# Patient Record
Sex: MALE | Race: WHITE | NOT HISPANIC OR LATINO | URBAN - METROPOLITAN AREA
[De-identification: names, ages, dates, MRNs, and addresses within clinical notes are randomized per-mention and may not be internally consistent; named-entity substitution may affect disease eponyms.]

---

## 2017-07-10 ENCOUNTER — INPATIENT (INPATIENT)
Facility: HOSPITAL | Age: 67
LOS: 10 days | Discharge: HOME | End: 2017-07-21
Attending: FAMILY MEDICINE | Admitting: FAMILY MEDICINE
Payer: MEDICARE

## 2017-07-10 VITALS
DIASTOLIC BLOOD PRESSURE: 98 MMHG | RESPIRATION RATE: 18 BRPM | OXYGEN SATURATION: 100 % | SYSTOLIC BLOOD PRESSURE: 159 MMHG | TEMPERATURE: 90 F | HEART RATE: 88 BPM

## 2017-07-10 DIAGNOSIS — C22.0 LIVER CELL CARCINOMA: ICD-10-CM

## 2017-07-10 DIAGNOSIS — J90 PLEURAL EFFUSION, NOT ELSEWHERE CLASSIFIED: ICD-10-CM

## 2017-07-10 DIAGNOSIS — E11.9 TYPE 2 DIABETES MELLITUS WITHOUT COMPLICATIONS: ICD-10-CM

## 2017-07-10 DIAGNOSIS — Z29.9 ENCOUNTER FOR PROPHYLACTIC MEASURES, UNSPECIFIED: ICD-10-CM

## 2017-07-10 DIAGNOSIS — A41.9 SEPSIS, UNSPECIFIED ORGANISM: ICD-10-CM

## 2017-07-10 DIAGNOSIS — I10 ESSENTIAL (PRIMARY) HYPERTENSION: ICD-10-CM

## 2017-07-10 DIAGNOSIS — I81 PORTAL VEIN THROMBOSIS: ICD-10-CM

## 2017-07-10 LAB
ABO RH CONFIRMATION: SIGNIFICANT CHANGE UP
ALBUMIN SERPL ELPH-MCNC: 1.8 G/DL — LOW (ref 3.3–5)
ALP SERPL-CCNC: 738 U/L — HIGH (ref 40–120)
ALT FLD-CCNC: 102 U/L — HIGH (ref 12–78)
ANION GAP SERPL CALC-SCNC: 9 MMOL/L — SIGNIFICANT CHANGE UP (ref 5–17)
APPEARANCE UR: CLEAR — SIGNIFICANT CHANGE UP
APTT BLD: 31.6 SEC — SIGNIFICANT CHANGE UP (ref 27.5–37.4)
AST SERPL-CCNC: 242 U/L — HIGH (ref 15–37)
BACTERIA # UR AUTO: (no result)
BASOPHILS # BLD AUTO: 0 K/UL — SIGNIFICANT CHANGE UP (ref 0–0.2)
BASOPHILS NFR BLD AUTO: 0.3 % — SIGNIFICANT CHANGE UP (ref 0–2)
BILIRUB SERPL-MCNC: 1.2 MG/DL — SIGNIFICANT CHANGE UP (ref 0.2–1.2)
BILIRUB UR-MCNC: NEGATIVE — SIGNIFICANT CHANGE UP
BLD GP AB SCN SERPL QL: SIGNIFICANT CHANGE UP
BUN SERPL-MCNC: 34 MG/DL — HIGH (ref 7–23)
CALCIUM SERPL-MCNC: 8.4 MG/DL — LOW (ref 8.5–10.1)
CHLORIDE SERPL-SCNC: 102 MMOL/L — SIGNIFICANT CHANGE UP (ref 96–108)
CK SERPL-CCNC: 58 U/L — SIGNIFICANT CHANGE UP (ref 26–308)
CO2 SERPL-SCNC: 26 MMOL/L — SIGNIFICANT CHANGE UP (ref 22–31)
COLOR SPEC: YELLOW — SIGNIFICANT CHANGE UP
CREAT SERPL-MCNC: 0.79 MG/DL — SIGNIFICANT CHANGE UP (ref 0.5–1.3)
DIFF PNL FLD: (no result)
EOSINOPHIL # BLD AUTO: 0 K/UL — SIGNIFICANT CHANGE UP (ref 0–0.5)
EOSINOPHIL NFR BLD AUTO: 0 % — SIGNIFICANT CHANGE UP (ref 0–6)
EPI CELLS # UR: SIGNIFICANT CHANGE UP
GLUCOSE SERPL-MCNC: 288 MG/DL — HIGH (ref 70–99)
GLUCOSE UR QL: 1000 MG/DL
HCT VFR BLD CALC: 32.5 % — LOW (ref 39–50)
HGB BLD-MCNC: 10.8 G/DL — LOW (ref 13–17)
INR BLD: 1.39 RATIO — HIGH (ref 0.88–1.16)
KETONES UR-MCNC: NEGATIVE — SIGNIFICANT CHANGE UP
LACTATE SERPL-SCNC: 2.3 MMOL/L — HIGH (ref 0.7–2)
LEUKOCYTE ESTERASE UR-ACNC: (no result)
LIDOCAIN IGE QN: 65 U/L — LOW (ref 73–393)
LYMPHOCYTES # BLD AUTO: 0.6 K/UL — LOW (ref 1–3.3)
LYMPHOCYTES # BLD AUTO: 3.9 % — LOW (ref 13–44)
MCHC RBC-ENTMCNC: 32.6 PG — SIGNIFICANT CHANGE UP (ref 27–34)
MCHC RBC-ENTMCNC: 33.2 GM/DL — SIGNIFICANT CHANGE UP (ref 32–36)
MCV RBC AUTO: 98.1 FL — SIGNIFICANT CHANGE UP (ref 80–100)
MONOCYTES # BLD AUTO: 0.4 K/UL — SIGNIFICANT CHANGE UP (ref 0–0.9)
MONOCYTES NFR BLD AUTO: 2.7 % — SIGNIFICANT CHANGE UP (ref 2–14)
NEUTROPHILS # BLD AUTO: 15.3 K/UL — HIGH (ref 1.8–7.4)
NEUTROPHILS NFR BLD AUTO: 93.1 % — HIGH (ref 43–77)
NITRITE UR-MCNC: NEGATIVE — SIGNIFICANT CHANGE UP
NT-PROBNP SERPL-SCNC: 415 PG/ML — HIGH (ref 0–125)
PH UR: 5 — SIGNIFICANT CHANGE UP (ref 5–8)
PLATELET # BLD AUTO: 147 K/UL — LOW (ref 150–400)
POTASSIUM SERPL-MCNC: 4.3 MMOL/L — SIGNIFICANT CHANGE UP (ref 3.5–5.3)
POTASSIUM SERPL-SCNC: 4.3 MMOL/L — SIGNIFICANT CHANGE UP (ref 3.5–5.3)
PROT SERPL-MCNC: 6 GM/DL — SIGNIFICANT CHANGE UP (ref 6–8.3)
PROT UR-MCNC: 30 MG/DL
PROTHROM AB SERPL-ACNC: 15.1 SEC — HIGH (ref 9.8–12.7)
RBC # BLD: 3.31 M/UL — LOW (ref 4.2–5.8)
RBC # FLD: 14.2 % — SIGNIFICANT CHANGE UP (ref 10.3–14.5)
RBC CASTS # UR COMP ASSIST: (no result) /HPF (ref 0–4)
SODIUM SERPL-SCNC: 137 MMOL/L — SIGNIFICANT CHANGE UP (ref 135–145)
SP GR SPEC: 1.02 — SIGNIFICANT CHANGE UP (ref 1.01–1.02)
TROPONIN I SERPL-MCNC: 0.02 NG/ML — SIGNIFICANT CHANGE UP (ref 0.01–0.04)
TROPONIN I SERPL-MCNC: <0.015 NG/ML — SIGNIFICANT CHANGE UP (ref 0.01–0.04)
TYPE + AB SCN PNL BLD: SIGNIFICANT CHANGE UP
UROBILINOGEN FLD QL: 1 MG/DL
WBC # BLD: 16.5 K/UL — HIGH (ref 3.8–10.5)
WBC # FLD AUTO: 16.5 K/UL — HIGH (ref 3.8–10.5)
WBC UR QL: (no result)

## 2017-07-10 PROCEDURE — 99285 EMERGENCY DEPT VISIT HI MDM: CPT

## 2017-07-10 PROCEDURE — 71010: CPT | Mod: 26

## 2017-07-10 PROCEDURE — 93010 ELECTROCARDIOGRAM REPORT: CPT

## 2017-07-10 PROCEDURE — 74177 CT ABD & PELVIS W/CONTRAST: CPT | Mod: 26

## 2017-07-10 PROCEDURE — 70450 CT HEAD/BRAIN W/O DYE: CPT | Mod: 26

## 2017-07-10 RX ORDER — SODIUM CHLORIDE 9 MG/ML
3 INJECTION INTRAMUSCULAR; INTRAVENOUS; SUBCUTANEOUS ONCE
Qty: 0 | Refills: 0 | Status: COMPLETED | OUTPATIENT
Start: 2017-07-10 | End: 2017-07-10

## 2017-07-10 RX ORDER — PANTOPRAZOLE SODIUM 20 MG/1
40 TABLET, DELAYED RELEASE ORAL DAILY
Qty: 0 | Refills: 0 | Status: DISCONTINUED | OUTPATIENT
Start: 2017-07-10 | End: 2017-07-15

## 2017-07-10 RX ORDER — INSULIN LISPRO 100/ML
VIAL (ML) SUBCUTANEOUS
Qty: 0 | Refills: 0 | Status: DISCONTINUED | OUTPATIENT
Start: 2017-07-10 | End: 2017-07-15

## 2017-07-10 RX ORDER — CEFEPIME 1 G/1
2000 INJECTION, POWDER, FOR SOLUTION INTRAMUSCULAR; INTRAVENOUS ONCE
Qty: 0 | Refills: 0 | Status: COMPLETED | OUTPATIENT
Start: 2017-07-10 | End: 2017-07-10

## 2017-07-10 RX ORDER — ALBUMIN HUMAN 25 %
50 VIAL (ML) INTRAVENOUS ONCE
Qty: 0 | Refills: 0 | Status: COMPLETED | OUTPATIENT
Start: 2017-07-10 | End: 2017-07-10

## 2017-07-10 RX ORDER — CEFEPIME 1 G/1
2000 INJECTION, POWDER, FOR SOLUTION INTRAMUSCULAR; INTRAVENOUS EVERY 8 HOURS
Qty: 0 | Refills: 0 | Status: DISCONTINUED | OUTPATIENT
Start: 2017-07-10 | End: 2017-07-11

## 2017-07-10 RX ORDER — SODIUM CHLORIDE 9 MG/ML
1000 INJECTION, SOLUTION INTRAVENOUS
Qty: 0 | Refills: 0 | Status: DISCONTINUED | OUTPATIENT
Start: 2017-07-10 | End: 2017-07-12

## 2017-07-10 RX ORDER — SODIUM CHLORIDE 9 MG/ML
500 INJECTION INTRAMUSCULAR; INTRAVENOUS; SUBCUTANEOUS
Qty: 0 | Refills: 0 | Status: COMPLETED | OUTPATIENT
Start: 2017-07-10 | End: 2017-07-10

## 2017-07-10 RX ORDER — ONDANSETRON 8 MG/1
4 TABLET, FILM COATED ORAL EVERY 8 HOURS
Qty: 0 | Refills: 0 | Status: DISCONTINUED | OUTPATIENT
Start: 2017-07-10 | End: 2017-07-21

## 2017-07-10 RX ORDER — DEXTROSE 50 % IN WATER 50 %
1 SYRINGE (ML) INTRAVENOUS ONCE
Qty: 0 | Refills: 0 | Status: DISCONTINUED | OUTPATIENT
Start: 2017-07-10 | End: 2017-07-15

## 2017-07-10 RX ORDER — GLUCAGON INJECTION, SOLUTION 0.5 MG/.1ML
1 INJECTION, SOLUTION SUBCUTANEOUS ONCE
Qty: 0 | Refills: 0 | Status: DISCONTINUED | OUTPATIENT
Start: 2017-07-10 | End: 2017-07-21

## 2017-07-10 RX ORDER — DEXTROSE 50 % IN WATER 50 %
25 SYRINGE (ML) INTRAVENOUS ONCE
Qty: 0 | Refills: 0 | Status: DISCONTINUED | OUTPATIENT
Start: 2017-07-10 | End: 2017-07-15

## 2017-07-10 RX ORDER — DEXTROSE 50 % IN WATER 50 %
12.5 SYRINGE (ML) INTRAVENOUS ONCE
Qty: 0 | Refills: 0 | Status: DISCONTINUED | OUTPATIENT
Start: 2017-07-10 | End: 2017-07-15

## 2017-07-10 RX ORDER — VANCOMYCIN HCL 1 G
1500 VIAL (EA) INTRAVENOUS ONCE
Qty: 0 | Refills: 0 | Status: COMPLETED | OUTPATIENT
Start: 2017-07-10 | End: 2017-07-10

## 2017-07-10 RX ADMIN — SODIUM CHLORIDE 2000 MILLILITER(S): 9 INJECTION INTRAMUSCULAR; INTRAVENOUS; SUBCUTANEOUS at 16:15

## 2017-07-10 RX ADMIN — SODIUM CHLORIDE 2000 MILLILITER(S): 9 INJECTION INTRAMUSCULAR; INTRAVENOUS; SUBCUTANEOUS at 15:45

## 2017-07-10 RX ADMIN — SODIUM CHLORIDE 2000 MILLILITER(S): 9 INJECTION INTRAMUSCULAR; INTRAVENOUS; SUBCUTANEOUS at 16:00

## 2017-07-10 RX ADMIN — SODIUM CHLORIDE 2000 MILLILITER(S): 9 INJECTION INTRAMUSCULAR; INTRAVENOUS; SUBCUTANEOUS at 15:30

## 2017-07-10 RX ADMIN — CEFEPIME 100 MILLIGRAM(S): 1 INJECTION, POWDER, FOR SOLUTION INTRAMUSCULAR; INTRAVENOUS at 15:45

## 2017-07-10 RX ADMIN — Medication 250 MILLIGRAM(S): at 18:15

## 2017-07-10 RX ADMIN — SODIUM CHLORIDE 3 MILLILITER(S): 9 INJECTION INTRAMUSCULAR; INTRAVENOUS; SUBCUTANEOUS at 16:58

## 2017-07-10 NOTE — ED PROVIDER NOTE - OBJECTIVE STATEMENT
67y M PMH IDDM, Liver CA, Smoking, Prior ETOH, presents to ED BIBEMS after being found  unresponsive.  Fingerstick on arrival in ED 24, received 2 amps D50, subsequent fingerstick 118, repeated 10 minutes later, 118.  Denies fevers, but states he feels chills.  Rectal temp in room 90.  Denies nausea, vomiting, diarrhea, however family state he was found in a "whole lot of liquid" in his bed.  Pt reports feeling confused.  Reports having decreased PO intake for several days.  Denies trauma or travel.  Patient and family are unsure of what medications he takes.  His normal routine care is through [facility].

## 2017-07-10 NOTE — H&P ADULT - PROBLEM SELECTOR PLAN 2
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

## 2017-07-10 NOTE — H&P ADULT - NSHPLABSRESULTS_GEN_ALL_CORE
10.8   16.5  )-----------( 147      ( 10 Jul 2017 15:43 )             32.5       CBC Full  -  ( 10 Jul 2017 15:43 )  WBC Count : 16.5 K/uL  Hemoglobin : 10.8 g/dL  Hematocrit : 32.5 %  Platelet Count - Automated : 147 K/uL  Mean Cell Volume : 98.1 fl  Mean Cell Hemoglobin : 32.6 pg  Mean Cell Hemoglobin Concentration : 33.2 gm/dL  Auto Neutrophil # : 15.3 K/uL  Auto Lymphocyte # : 0.6 K/uL  Auto Monocyte # : 0.4 K/uL  Auto Eosinophil # : 0.0 K/uL  Auto Basophil # : 0.0 K/uL  Auto Neutrophil % : 93.1 %  Auto Lymphocyte % : 3.9 %  Auto Monocyte % : 2.7 %  Auto Eosinophil % : 0.0 %  Auto Basophil % : 0.3 %      07-10    137  |  102  |  34<H>  ----------------------------<  288<H>  4.3   |  26  |  0.79    Ca    8.4<L>      10 Jul 2017 15:43    TPro  6.0  /  Alb  1.8<L>  /  TBili  1.2  /  DBili  x   /  AST  242<H>  /  ALT  102<H>  /  AlkPhos  738<H>  07-10      LIVER FUNCTIONS - ( 10 Jul 2017 15:43 )  Alb: 1.8 g/dL / Pro: 6.0 gm/dL / ALK PHOS: 738 U/L / ALT: 102 U/L / AST: 242 U/L / GGT: x             PT/INR - ( 10 Jul 2017 15:43 )   PT: 15.1 sec;   INR: 1.39 ratio         PTT - ( 10 Jul 2017 15:43 )  PTT:31.6 sec    CARDIAC MARKERS ( 10 Jul 2017 19:18 )  0.022 ng/mL / x     / x     / x     / x      CARDIAC MARKERS ( 10 Jul 2017 15:43 )  <0.015 ng/mL / x     / 58 U/L / x     / x            Urinalysis Basic - ( 10 Jul 2017 15:43 )    Color: Yellow / Appearance: Clear / S.020 / pH: x  Gluc: x / Ketone: Negative  / Bili: Negative / Urobili: 1 mg/dL   Blood: x / Protein: 30 mg/dL / Nitrite: Negative   Leuk Esterase: Trace / RBC: 3-5 /HPF / WBC 6-10   Sq Epi: x / Non Sq Epi: x / Bacteria: Many

## 2017-07-10 NOTE — ED PROVIDER NOTE - MEDICAL DECISION MAKING DETAILS
67y M w/ decreased responsiveness, hypoglycemia, hypothermia.  Concern for accidental insulin overdose, sepsis, dehydration.  Plan for labs, imaging, reassess.  Will give empiric abx for sepsis coverage.

## 2017-07-10 NOTE — H&P ADULT - NSHPPHYSICALEXAM_GEN_ALL_CORE
PHYSICAL EXAM:    Daily Height in cm: 167.64 (10 Jul 2017 15:12)    Daily     ICU Vital Signs Last 24 Hrs  T(C): 36.8 (10 Jul 2017 19:57), Max: 36.8 (10 Jul 2017 19:57)  T(F): 98.3 (10 Jul 2017 19:57), Max: 98.3 (10 Jul 2017 19:57)  HR: 90 (10 Jul 2017 19:57) (84 - 99)  BP: 141/79 (10 Jul 2017 19:57) (141/79 - 195/101)  BP(mean): --  ABP: --  ABP(mean): --  RR: 22 (10 Jul 2017 19:57) (18 - 22)  SpO2: 100% (10 Jul 2017 19:57) (100% - 100%)      Constitutional: NAD  HEENT: Atraumatic, FLORENCIA, Normal, No congestion  Respiratory: decreased breath sounds B/l lung bases  Cardiovascular: N S1S2; JOANNA present  Gastrointestinal: Abdomen soft, generalised abdominal tenderness with mild distention ,no rebound ,no guarding, Bowel Ssounds present  Extremities: No edema, peripheral pulses present  Neurological: AAO x 3, no gross focal motor deficits,no nuchal rigidity  Skin: Non cellulitic,    Back: No CVA tenderness   Musculoskeletal: non tender  Breasts: Deferred  Genitourinary: deferred  Rectal: Deferred

## 2017-07-10 NOTE — H&P ADULT - HISTORY OF PRESENT ILLNESS
67y M PMH IDDM, Liver CA last chemo per patient was 1 week ago (Dr Baez-oncology at [place]), Smoking, Prior ETOH, presents to ED BIBEMS after being found  unresponsive and was found to be hypothermic Rectal temp 90F and hypoglycemic   Fingerstick on arrival in ED 24, received 2 amps D50, subsequent fingerstick 118, repeated 10 minutes later, 118.  Denies fevers, but states he feels chills and nausea.  Rectal temp in room 90.  Denies vomiting, diarrhea,headache,SOB or cp, however family state he was found in a "whole lot of liquid" in his bed.  Pt reports feeling confused.  Reports having decreased PO intake for several days.  Denies trauma or travel.  Patient and family are unsure of what medications he takes.  His normal routine care is through [facility]. Patient reports chronic abdominal pain for which he takes oxycontin at home,unsure of dose .denies any new or worsening of his chronic abdominal pain  In ED head CT- no acute pathology  Ct abd/pelvis - EKG [id number] waves in septal leads ,nonspecific ST/T waves  patient was given 2 Litres NS ,cefepime ,vanco and placed on heating blankets after which his hypothermia resolved and he is alert ,awake and oriented x3   Pmhx - HTN,liver cancer on chemo,chronic pain  Pshx- exploratory lap 10 years ago social hx- crhonic smoker,hx of etoh use ,denies recreationald rug use  Family hx - non contributory

## 2017-07-10 NOTE — CONSULT NOTE ADULT - ASSESSMENT
67 Y old male with advanced liver CA, tumor infiltration of IVC, and mesenteric vein thrombus, portal vein thrombus, not sure if acute or chronic GB distended on CT, Not peritoneal on exam, HD stable  IV hydration  Oncology consult  Palliative consult  Not a surgical candidate, if continue to have concern about cholecystitis consider IR evaluation  Vascular consult for portal vein, and SMV thrombus, not a surgical candidate with advanced metastatic disease if concern about venous ischemia.

## 2017-07-10 NOTE — H&P ADULT - ASSESSMENT
67y M PMH IDDM, Liver CA last chemo per patient was 1 week ago (Dr Baez-oncology at [place]), Smoking, Prior ETOH, presents to ED BIBEMS after being found  unresponsive and  hypothermic Rectal temp 90F and hypoglycemic,found to be septic

## 2017-07-10 NOTE — ED ADULT NURSE REASSESSMENT NOTE - NS ED NURSE REASSESS COMMENT FT1
Pt recvd from Karen Toth RN
albumin human IVPB received from pharmacy. medication spiked and hung on pt stretcher along with filter tubing. 1N contacted,  will notify RN to initiate infusion.

## 2017-07-10 NOTE — CONSULT NOTE ADULT - SUBJECTIVE AND OBJECTIVE BOX
Patient is a 67y old  Male who presents with a chief complaint of unresponsiveness (10 Jul 2017 22:50)  HPI:  67y M PMH IDDM, Liver CA last chemo per patient was 1 week ago (Dr Baez-oncology at [place]), Smoking, Prior ETOH, presents to ED BIBEMS after being found  unresponsive and was found to be hypothermic Rectal temp 90F and hypoglycemic   Fingerstick on arrival in ED 24, received 2 amps D50, subsequent fingerstick 118, repeated 10 minutes later, 118.  Denies fevers, but states he feels chills and nausea.  Rectal temp in room 90.  Denies vomiting, diarrhea,headache,SOB or cp, however family state he was found in a "whole lot of liquid" in his bed.  Pt reports feeling confused.  Reports having decreased PO intake for several days.  Denies trauma or travel.  Mild abdominal pain, passing gas, no BM.    PAST MEDICAL & SURGICAL HISTORY:  Insulin dependent diabetes mellitus  Liver cancer  PH: Ex lap  FH; NC  Socia HX; smoker, H/o ETOH abuse    Allergies    No Known Allergies    Intolerances    MEDICATIONS  (STANDING):  cefepime  IVPB 2000 milliGRAM(s) IV Intermittent every 8 hours  insulin lispro (HumaLOG) corrective regimen sliding scale   SubCutaneous three times a day before meals  dextrose 5%. 1000 milliLiter(s) (50 mL/Hr) IV Continuous <Continuous>  dextrose 50% Injectable 12.5 Gram(s) IV Push once  dextrose 50% Injectable 25 Gram(s) IV Push once  dextrose 50% Injectable 25 Gram(s) IV Push once  pantoprazole  Injectable 40 milliGRAM(s) IV Push daily  dextrose 5% + sodium chloride 0.9%. 1000 milliLiter(s) (80 mL/Hr) IV Continuous <Continuous>    Vital Signs Last 24 Hrs  T(C): 36.2 (10 Jul 2017 23:39), Max: 36.8 (10 Jul 2017 19:57)  T(F): 97.2 (10 Jul 2017 23:39), Max: 98.3 (10 Jul 2017 19:57)  HR: 81 (10 Jul 2017 23:39) (81 - 99)  BP: 145/71 (10 Jul 2017 23:39) (121/70 - 195/101)  BP(mean): --  RR: 17 (10 Jul 2017 23:39) (17 - 22)  SpO2: 100% (10 Jul 2017 23:39) (100% - 100%)    PHYSICAL EXAM:      Constitutional: NAD, emaciated.  Labs:                          10.8   16.5  )-----------( 147      ( 10 Jul 2017 15:43 )             32.5       07-10    137  |  102  |  34<H>  ----------------------------<  288<H>  4.3   |  26  |  0.79    Ca    8.4<L>      10 Jul 2017 15:43    < from: CT Abdomen and Pelvis w/ Oral Cont and w/ IV Cont (07.10.17 @ 17:47) >  MPRESSION:    Large hepatic mass involving the caudate lobe and right hepatic lobe with   tumor thrombus within the main portal vein and proximal splenic and   superior mesenteric vein with additional thrombus seen into the   suprahepatic IVC just proximal to the right atrium, with numerous   additional hepatic lesions in the setting of a cirrhotic liver suggesting   metastatic hepatocellular carcinoma.    Large paraesophageal and gastric varices.    Mild abdominal and pelvic ascites with suggestion of carcinomatosis.    Right pleural effusion.    Common bile duct stent visualized with left-sided biliary dilatation.      < end of copied text >  < from: CT Head No Cont (07.10.17 @ 17:41) >    Impression:    Age related involutional and microvascular ischemic changes, without   evidence of intracranial hemorrhage, mass effect or midline shift.        < end of copied text >      TPro  6.0  /  Alb  1.8<L>  /  TBili  1.2  /  DBili  x   /  AST  242<H>  /  ALT  102<H>  /  AlkPhos  738<H>  07-10      PT/INR - ( 10 Jul 2017 15:43 )   PT: 15.1 sec;   INR: 1.39 ratio         PTT - ( 10 Jul 2017 15:43 )  PTT:31.6 sec  General:  AOx3    Respiratory:  CTABL    Cardiovascular: S1+S2+0    Gastrointestinal : Abdomen soft, distended mild diffuse abdominal tenderness, no rebound, Palpable liver.    Extremities: NV intact    Neurological: No focal deficit.

## 2017-07-10 NOTE — H&P ADULT - PROBLEM SELECTOR PLAN 3
Right pleural effusion secondary to thrird spacing (hypoalbuminemia)vs metastatic   #IV albumin infusion  #IR consult
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
## 2017-07-10 NOTE — ED PROVIDER NOTE - CONSTITUTIONAL, MLM
normal... Chronically ill appearing, cachectic, awake, alert, oriented to person, place, time/situation and in no apparent distress.

## 2017-07-10 NOTE — ED ADULT NURSE NOTE - OBJECTIVE STATEMENT
Pt biba after being found unresponsive. Pt's BGM upon arrival   was 24. Pt biba after being found unresponsive. Pt's BGM upon arrival   was 24. was given 2 amps of D50 and repeat BGM was 118.  Alert and awake after d 50 x 2 given

## 2017-07-10 NOTE — H&P ADULT - PROBLEM SELECTOR PLAN 4
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

## 2017-07-11 LAB
-  CANDIDA ALBICANS: SIGNIFICANT CHANGE UP
-  CANDIDA GLABRATA: SIGNIFICANT CHANGE UP
-  CANDIDA KRUSEI: SIGNIFICANT CHANGE UP
-  CANDIDA PARAPSILOSIS: SIGNIFICANT CHANGE UP
-  CANDIDA TROPICALIS: SIGNIFICANT CHANGE UP
-  COAGULASE NEGATIVE STAPHYLOCOCCUS: SIGNIFICANT CHANGE UP
-  K. PNEUMONIAE GROUP: SIGNIFICANT CHANGE UP
-  KPC RESISTANCE GENE: SIGNIFICANT CHANGE UP
-  STREPTOCOCCUS SP. (NOT GRP A, B OR S PNEUMONIAE): SIGNIFICANT CHANGE UP
A BAUMANNII DNA SPEC QL NAA+PROBE: SIGNIFICANT CHANGE UP
ALBUMIN SERPL ELPH-MCNC: 2.1 G/DL — LOW (ref 3.3–5)
ALP SERPL-CCNC: 815 U/L — HIGH (ref 40–120)
ALT FLD-CCNC: 110 U/L — HIGH (ref 12–78)
ANION GAP SERPL CALC-SCNC: 7 MMOL/L — SIGNIFICANT CHANGE UP (ref 5–17)
APTT BLD: 29 SEC — SIGNIFICANT CHANGE UP (ref 27.5–37.4)
AST SERPL-CCNC: 195 U/L — HIGH (ref 15–37)
BASOPHILS # BLD AUTO: 0 K/UL — SIGNIFICANT CHANGE UP (ref 0–0.2)
BASOPHILS NFR BLD AUTO: 0.3 % — SIGNIFICANT CHANGE UP (ref 0–2)
BILIRUB SERPL-MCNC: 0.9 MG/DL — SIGNIFICANT CHANGE UP (ref 0.2–1.2)
BUN SERPL-MCNC: 25 MG/DL — HIGH (ref 7–23)
CALCIUM SERPL-MCNC: 8.3 MG/DL — LOW (ref 8.5–10.1)
CHLORIDE SERPL-SCNC: 101 MMOL/L — SIGNIFICANT CHANGE UP (ref 96–108)
CO2 SERPL-SCNC: 27 MMOL/L — SIGNIFICANT CHANGE UP (ref 22–31)
CREAT SERPL-MCNC: 0.76 MG/DL — SIGNIFICANT CHANGE UP (ref 0.5–1.3)
CULTURE RESULTS: SIGNIFICANT CHANGE UP
E CLOAC COMP DNA BLD POS QL NAA+PROBE: SIGNIFICANT CHANGE UP
E COLI DNA BLD POS QL NAA+NON-PROBE: SIGNIFICANT CHANGE UP
ENTEROCOC DNA BLD POS QL NAA+NON-PROBE: SIGNIFICANT CHANGE UP
ENTEROCOC DNA BLD POS QL NAA+NON-PROBE: SIGNIFICANT CHANGE UP
EOSINOPHIL # BLD AUTO: 0 K/UL — SIGNIFICANT CHANGE UP (ref 0–0.5)
EOSINOPHIL NFR BLD AUTO: 0.1 % — SIGNIFICANT CHANGE UP (ref 0–6)
GLUCOSE SERPL-MCNC: 106 MG/DL — HIGH (ref 70–99)
GP B STREP DNA BLD POS QL NAA+NON-PROBE: SIGNIFICANT CHANGE UP
GRAM STN FLD: SIGNIFICANT CHANGE UP
HAEM INFLU DNA BLD POS QL NAA+NON-PROBE: SIGNIFICANT CHANGE UP
HBA1C BLD-MCNC: 6.6 % — HIGH (ref 4–5.6)
HCT VFR BLD CALC: 30.4 % — LOW (ref 39–50)
HGB BLD-MCNC: 10.5 G/DL — LOW (ref 13–17)
INR BLD: 1.42 RATIO — HIGH (ref 0.88–1.16)
K OXYTOCA DNA BLD POS QL NAA+NON-PROBE: SIGNIFICANT CHANGE UP
L MONOCYTOG DNA BLD POS QL NAA+NON-PROBE: SIGNIFICANT CHANGE UP
LACTATE SERPL-SCNC: 1.7 MMOL/L — SIGNIFICANT CHANGE UP (ref 0.7–2)
LACTATE SERPL-SCNC: 1.8 MMOL/L — SIGNIFICANT CHANGE UP (ref 0.7–2)
LYMPHOCYTES # BLD AUTO: 0.9 K/UL — LOW (ref 1–3.3)
LYMPHOCYTES # BLD AUTO: 5.5 % — LOW (ref 13–44)
MCHC RBC-ENTMCNC: 33.4 PG — SIGNIFICANT CHANGE UP (ref 27–34)
MCHC RBC-ENTMCNC: 34.6 GM/DL — SIGNIFICANT CHANGE UP (ref 32–36)
MCV RBC AUTO: 96.4 FL — SIGNIFICANT CHANGE UP (ref 80–100)
METHOD TYPE: SIGNIFICANT CHANGE UP
MONOCYTES # BLD AUTO: 0.6 K/UL — SIGNIFICANT CHANGE UP (ref 0–0.9)
MONOCYTES NFR BLD AUTO: 3.4 % — SIGNIFICANT CHANGE UP (ref 2–14)
MRSA SPEC QL CULT: SIGNIFICANT CHANGE UP
MSSA DNA SPEC QL NAA+PROBE: SIGNIFICANT CHANGE UP
N MEN ISLT CULT: SIGNIFICANT CHANGE UP
NEUTROPHILS # BLD AUTO: 14.6 K/UL — HIGH (ref 1.8–7.4)
NEUTROPHILS NFR BLD AUTO: 90.7 % — HIGH (ref 43–77)
P AERUGINOSA DNA BLD POS NAA+NON-PROBE: SIGNIFICANT CHANGE UP
PLATELET # BLD AUTO: 130 K/UL — LOW (ref 150–400)
POTASSIUM SERPL-MCNC: 3.7 MMOL/L — SIGNIFICANT CHANGE UP (ref 3.5–5.3)
POTASSIUM SERPL-SCNC: 3.7 MMOL/L — SIGNIFICANT CHANGE UP (ref 3.5–5.3)
PROT SERPL-MCNC: 6.3 GM/DL — SIGNIFICANT CHANGE UP (ref 6–8.3)
PROTEUS SP DNA BLD POS QL NAA+NON-PROBE: SIGNIFICANT CHANGE UP
PROTHROM AB SERPL-ACNC: 15.4 SEC — HIGH (ref 9.8–12.7)
RBC # BLD: 3.15 M/UL — LOW (ref 4.2–5.8)
RBC # FLD: 13.8 % — SIGNIFICANT CHANGE UP (ref 10.3–14.5)
S MARCESCENS DNA BLD POS NAA+NON-PROBE: SIGNIFICANT CHANGE UP
S PNEUM DNA BLD POS QL NAA+NON-PROBE: SIGNIFICANT CHANGE UP
S PYO DNA BLD POS QL NAA+NON-PROBE: SIGNIFICANT CHANGE UP
SODIUM SERPL-SCNC: 135 MMOL/L — SIGNIFICANT CHANGE UP (ref 135–145)
SPECIMEN SOURCE: SIGNIFICANT CHANGE UP
TROPONIN I SERPL-MCNC: 0.01 NG/ML — SIGNIFICANT CHANGE UP (ref 0.01–0.04)
WBC # BLD: 16.1 K/UL — HIGH (ref 3.8–10.5)
WBC # FLD AUTO: 16.1 K/UL — HIGH (ref 3.8–10.5)

## 2017-07-11 PROCEDURE — 78226 HEPATOBILIARY SYSTEM IMAGING: CPT | Mod: 26

## 2017-07-11 RX ORDER — ALBUMIN HUMAN 25 %
50 VIAL (ML) INTRAVENOUS ONCE
Qty: 0 | Refills: 0 | Status: COMPLETED | OUTPATIENT
Start: 2017-07-11 | End: 2017-07-11

## 2017-07-11 RX ORDER — HEPARIN SODIUM 5000 [USP'U]/ML
2000 INJECTION INTRAVENOUS; SUBCUTANEOUS EVERY 6 HOURS
Qty: 0 | Refills: 0 | Status: DISCONTINUED | OUTPATIENT
Start: 2017-07-11 | End: 2017-07-12

## 2017-07-11 RX ORDER — SODIUM CHLORIDE 9 MG/ML
1000 INJECTION INTRAMUSCULAR; INTRAVENOUS; SUBCUTANEOUS
Qty: 0 | Refills: 0 | Status: DISCONTINUED | OUTPATIENT
Start: 2017-07-11 | End: 2017-07-11

## 2017-07-11 RX ORDER — DEXTROSE 50 % IN WATER 50 %
12.5 SYRINGE (ML) INTRAVENOUS ONCE
Qty: 0 | Refills: 0 | Status: COMPLETED | OUTPATIENT
Start: 2017-07-11 | End: 2017-07-11

## 2017-07-11 RX ORDER — HEPARIN SODIUM 5000 [USP'U]/ML
4500 INJECTION INTRAVENOUS; SUBCUTANEOUS EVERY 6 HOURS
Qty: 0 | Refills: 0 | Status: DISCONTINUED | OUTPATIENT
Start: 2017-07-11 | End: 2017-07-12

## 2017-07-11 RX ORDER — DEXTROSE 50 % IN WATER 50 %
25 SYRINGE (ML) INTRAVENOUS ONCE
Qty: 0 | Refills: 0 | Status: COMPLETED | OUTPATIENT
Start: 2017-07-11 | End: 2017-07-11

## 2017-07-11 RX ORDER — SODIUM CHLORIDE 9 MG/ML
1000 INJECTION, SOLUTION INTRAVENOUS
Qty: 0 | Refills: 0 | Status: DISCONTINUED | OUTPATIENT
Start: 2017-07-11 | End: 2017-07-12

## 2017-07-11 RX ORDER — GENTAMICIN SULFATE 40 MG/ML
150 VIAL (ML) INJECTION ONCE
Qty: 0 | Refills: 0 | Status: COMPLETED | OUTPATIENT
Start: 2017-07-11 | End: 2017-07-11

## 2017-07-11 RX ORDER — DEXTROSE 10 % IN WATER 10 %
1000 INTRAVENOUS SOLUTION INTRAVENOUS
Qty: 0 | Refills: 0 | Status: DISCONTINUED | OUTPATIENT
Start: 2017-07-11 | End: 2017-07-12

## 2017-07-11 RX ORDER — CEFEPIME 1 G/1
2000 INJECTION, POWDER, FOR SOLUTION INTRAMUSCULAR; INTRAVENOUS EVERY 12 HOURS
Qty: 0 | Refills: 0 | Status: DISCONTINUED | OUTPATIENT
Start: 2017-07-11 | End: 2017-07-15

## 2017-07-11 RX ORDER — SODIUM CHLORIDE 9 MG/ML
1000 INJECTION, SOLUTION INTRAVENOUS
Qty: 0 | Refills: 0 | Status: DISCONTINUED | OUTPATIENT
Start: 2017-07-11 | End: 2017-07-11

## 2017-07-11 RX ORDER — SODIUM CHLORIDE 9 MG/ML
1000 INJECTION, SOLUTION INTRAVENOUS
Qty: 0 | Refills: 0 | Status: COMPLETED | OUTPATIENT
Start: 2017-07-11 | End: 2017-07-11

## 2017-07-11 RX ORDER — FUROSEMIDE 40 MG
20 TABLET ORAL DAILY
Qty: 0 | Refills: 0 | Status: DISCONTINUED | OUTPATIENT
Start: 2017-07-11 | End: 2017-07-21

## 2017-07-11 RX ORDER — MORPHINE SULFATE 50 MG/1
2.4 CAPSULE, EXTENDED RELEASE ORAL ONCE
Qty: 0 | Refills: 0 | Status: DISCONTINUED | OUTPATIENT
Start: 2017-07-11 | End: 2017-07-11

## 2017-07-11 RX ORDER — HEPARIN SODIUM 5000 [USP'U]/ML
INJECTION INTRAVENOUS; SUBCUTANEOUS
Qty: 25000 | Refills: 0 | Status: DISCONTINUED | OUTPATIENT
Start: 2017-07-11 | End: 2017-07-12

## 2017-07-11 RX ORDER — OXYCODONE HYDROCHLORIDE 5 MG/1
5 TABLET ORAL ONCE
Qty: 0 | Refills: 0 | Status: DISCONTINUED | OUTPATIENT
Start: 2017-07-11 | End: 2017-07-11

## 2017-07-11 RX ORDER — HEPARIN SODIUM 5000 [USP'U]/ML
4500 INJECTION INTRAVENOUS; SUBCUTANEOUS ONCE
Qty: 0 | Refills: 0 | Status: DISCONTINUED | OUTPATIENT
Start: 2017-07-11 | End: 2017-07-12

## 2017-07-11 RX ORDER — DEXTROSE 10 % IN WATER 10 %
1000 INTRAVENOUS SOLUTION INTRAVENOUS
Qty: 0 | Refills: 0 | Status: DISCONTINUED | OUTPATIENT
Start: 2017-07-11 | End: 2017-07-11

## 2017-07-11 RX ADMIN — HEPARIN SODIUM 4500 UNIT(S): 5000 INJECTION INTRAVENOUS; SUBCUTANEOUS at 17:36

## 2017-07-11 RX ADMIN — SODIUM CHLORIDE 80 MILLILITER(S): 9 INJECTION INTRAMUSCULAR; INTRAVENOUS; SUBCUTANEOUS at 00:35

## 2017-07-11 RX ADMIN — SODIUM CHLORIDE 80 MILLILITER(S): 9 INJECTION, SOLUTION INTRAVENOUS at 02:28

## 2017-07-11 RX ADMIN — Medication 25 GRAM(S): at 08:02

## 2017-07-11 RX ADMIN — MORPHINE SULFATE 2.4 MILLIGRAM(S): 50 CAPSULE, EXTENDED RELEASE ORAL at 21:26

## 2017-07-11 RX ADMIN — Medication 25 GRAM(S): at 10:33

## 2017-07-11 RX ADMIN — HEPARIN SODIUM 1100 UNIT(S)/HR: 5000 INJECTION INTRAVENOUS; SUBCUTANEOUS at 17:36

## 2017-07-11 RX ADMIN — OXYCODONE HYDROCHLORIDE 5 MILLIGRAM(S): 5 TABLET ORAL at 21:19

## 2017-07-11 RX ADMIN — Medication 25 GRAM(S): at 18:22

## 2017-07-11 RX ADMIN — Medication 50 MILLILITER(S): at 06:14

## 2017-07-11 RX ADMIN — Medication 25 GRAM(S): at 01:20

## 2017-07-11 RX ADMIN — CEFEPIME 100 MILLIGRAM(S): 1 INJECTION, POWDER, FOR SOLUTION INTRAMUSCULAR; INTRAVENOUS at 22:20

## 2017-07-11 RX ADMIN — MORPHINE SULFATE 2.4 MILLIGRAM(S): 50 CAPSULE, EXTENDED RELEASE ORAL at 14:47

## 2017-07-11 RX ADMIN — OXYCODONE HYDROCHLORIDE 5 MILLIGRAM(S): 5 TABLET ORAL at 22:20

## 2017-07-11 RX ADMIN — Medication 25 GRAM(S): at 05:06

## 2017-07-11 RX ADMIN — SODIUM CHLORIDE 60 MILLILITER(S): 9 INJECTION, SOLUTION INTRAVENOUS at 11:06

## 2017-07-11 RX ADMIN — PANTOPRAZOLE SODIUM 40 MILLIGRAM(S): 20 TABLET, DELAYED RELEASE ORAL at 00:42

## 2017-07-11 RX ADMIN — SODIUM CHLORIDE 60 MILLILITER(S): 9 INJECTION, SOLUTION INTRAVENOUS at 06:44

## 2017-07-11 RX ADMIN — Medication 100 MILLILITER(S): at 21:18

## 2017-07-11 RX ADMIN — CEFEPIME 100 MILLIGRAM(S): 1 INJECTION, POWDER, FOR SOLUTION INTRAMUSCULAR; INTRAVENOUS at 06:39

## 2017-07-11 RX ADMIN — Medication 50 MILLILITER(S): at 00:29

## 2017-07-11 RX ADMIN — PANTOPRAZOLE SODIUM 40 MILLIGRAM(S): 20 TABLET, DELAYED RELEASE ORAL at 11:58

## 2017-07-11 RX ADMIN — Medication 200 MILLIGRAM(S): at 11:58

## 2017-07-11 RX ADMIN — Medication 20 MILLIGRAM(S): at 17:35

## 2017-07-11 NOTE — PROVIDER CONTACT NOTE (CHANGE IN STATUS NOTIFICATION) - ACTION/TREATMENT ORDERED:
25G IVP dextrose given per protocol, fluids to be changed to D5NS at 80ml/hr, will continue to monitor BGMs

## 2017-07-11 NOTE — DIETITIAN INITIAL EVALUATION ADULT. - PHYSICAL APPEARANCE
Pt with moderate muscle wasting in deltoid and temples. Pt with severe muscle wasting in clavicle, interosseous. Pt with severe fat wasting in triceps and ribs/other (specify)

## 2017-07-11 NOTE — CONSULT NOTE ADULT - ASSESSMENT
Cirrhosis with liver cancer  Venous thrombus /tumor  tiny droplets of air in GB with distension of gb -r/o cholecystitis    HIDA scan  Surgical consult (general and vascular)  If hida positive then consider IR consult for possible drainage  broad spectrum iv abx   iv f

## 2017-07-11 NOTE — CHART NOTE - NSCHARTNOTEFT_GEN_A_CORE
Upon Nutritional Assessment by the Registered Dietitian your patient was determined to meet criteria has evidence of the following diagnosis/diagnoses:        [ ]  Mild Protein Calorie Malnutrition        [ ]  Moderate Protein Calorie Malnutrition        [x] Severe Protein Calorie Malnutrition        [ ] Unspecified Protein Calorie Malnutrition    Findings as based on:  •  Comprehensive nutrition assessment and Nutrition focused physical examination  •  Insufficient food/energy intake  •  Weight loss over time(Please specify time period)  •  Loss of muscle mass  •  Loss of fat mass  •  Fluid accumulation    Findings relevant to patient:  1. Significant wt loss (21% BW over 7 months)  2. Mod-Severe muscle wasting  3 Severe fat wasting  4.) Self reported decrease PO intake (<75% of needs for >1 month)  5.) NFPE Findings ( Cracked nails, Gray spotting in eye)    Nutrition Interventions:  1. Advance diet when medically feasible  2. 8oz. Glucerna TID  3.   TO BE COMPLETED BY PROVIDER:          [ ] Agree:         [ ] Disagree:    Comments: Upon Nutritional Assessment by the Registered Dietitian your patient was determined to meet criteria has evidence of the following diagnosis/diagnoses:        [ ]  Mild Protein Calorie Malnutrition        [ ]  Moderate Protein Calorie Malnutrition        [x] Severe Protein Calorie Malnutrition        [ ] Unspecified Protein Calorie Malnutrition    Findings as based on:  •  Comprehensive nutrition assessment and Nutrition focused physical examination  •  Insufficient food/energy intake  •  Weight loss over time(Please specify time period)  •  Loss of muscle mass  •  Loss of fat mass  •  Fluid accumulation    Findings relevant to patient:  1. Significant wt loss (21% BW over 7 months)  2. Mod-Severe muscle wasting  3 Severe fat wasting  4.) Self reported decrease PO intake (<75% of needs for >1 month)  5.) NFPE Findings ( Cracked nails, Gray spotting in eye)    Nutrition Interventions:  1. Advance diet when medically feasible  2. 8oz. Glucerna TID  3.   TO BE COMPLETED BY PROVIDER:          [x ] Agree:         [ ] Disagree:    Comments:

## 2017-07-11 NOTE — CONSULT NOTE ADULT - ASSESSMENT
66 y/o Male with h/o IDDM, Liver CA s/p chemo (last cycle 1 week PTA with Dr Baez-oncology at [place]), chronic pain was admitted on 7/10 for confusion, hypoglycemia and hypothermia. He was BIBEMS after being found  unresponsive and hypothermic with rectal temp 90F. Denies fevers, but states he feels chills and nausea. He may have had diarrhea since family states he was found in a "whole lot of liquid" in his bed. Pt reports feeling confused.  Reports having decreased PO intake for several days. Patient reports chronic abdominal pain for which he takes oxycontin at home. In ER, he received vancomycin IV and cefepime.     1. Hypothermia. Sepsis with GNR. Large liver mass with portal vein, splenic vein, superior mesenteric vein and suprahepatic vein thrombosis. Hepatocellular carcinoma. Possible septic thrombus. Possible acute cholangitis. Likely abdominal carcinomatosis. Immunocompromised host.   -leukocytosis  -obtain BC x 2  -agree with cefepime 2 gm IV q12h  -give gentamicin 150 mg IV x 1  -monitor temps  -f/u CBC  -supportive care  2. Other issues:   -care per medicine 66 y/o Male with h/o IDDM, Liver CA s/p chemo (last cycle 1 week PTA with Dr Baez-oncology at [place]), chronic pain was admitted on 7/10 for confusion, hypoglycemia and hypothermia. He was BIBEMS after being found  unresponsive and hypothermic with rectal temp 90F. Denies fevers, but states he feels chills and nausea. He may have had diarrhea since family states he was found in a "whole lot of liquid" in his bed. Pt reports feeling confused.  Reports having decreased PO intake for several days. Patient reports chronic abdominal pain for which he takes oxycontin at home. In ER, he received vancomycin IV and cefepime.     1. Hypothermia. Sepsis with GNR. Large liver mass with portal vein, splenic vein, superior mesenteric vein and suprahepatic vein thrombosis. Hepatocellular carcinoma. Possible septic thrombus. Possible acute cholangitis. Likely abdominal carcinomatosis. Immunocompromised host.   -encephalopathy improving  -leukocytosis  -obtain BC x 2  -agree with cefepime 2 gm IV q12h  -give gentamicin 150 mg IV x 1  -monitor temps  -f/u CBC  -supportive care  2. Other issues: Hypoglycemia  -care per medicine

## 2017-07-11 NOTE — CONSULT NOTE ADULT - SUBJECTIVE AND OBJECTIVE BOX
History and Physical:   Source of Information	Chart(s), Patient	  Comments/Contacts	patient does not recall his home meds ,need to call his pharmacy in am for med rec	  Outpatient Providers	Dr Nelson- PCP Dr Baez- Oncology- at [facility]	    Language:  · Patient/Family of Limited English Proficiency	No	      History of Present Illness:  Chief Complaint/Reason for Admission: unresponsiveness	  History of Present Illness: 	   67y M PMH IDDM, Liver CA last chemo per patient was 1 week ago (Dr Baez-oncology at [facility]), Smoking, Prior ETOH, presents to ED BIBEMS after being found  unresponsive and was found to be hypothermic Rectal temp 90F and hypoglycemic   Fingerstick on arrival in ED 24, received 2 amps D50, subsequent fingerstick 118, repeated 10 minutes later, 118.  Denies fevers, but states he feels chills and nausea.  Rectal temp in room 90.  Denies vomiting, diarrhea,headache,SOB or cp, however family state he was found in a "whole lot of liquid" in his bed.  Pt reports feeling confused.  Reports having decreased PO intake for several days.  Denies trauma or travel.  Patient and family are unsure of what medications he takes.  His normal routine care is through [facility]. Patient reports chronic abdominal pain for which he takes oxycontin at home,unsure of dose .denies any new or worsening of his chronic abdominal pain  In ED head CT- no acute pathology   Does not report history of variceal bleed or other upper GI bleed EKG [id number] waves in septal leads ,nonspecific ST/T waves  patient was given 2 Litres NS ,cefepime ,vanco and placed on heating blankets after which his hypothermia resolved and he is alert ,awake and oriented x3   Pmhx - HTN,liver cancer on chemo,chronic pain  Pshx- exploratory lap 10 years ago social hx- crhonic smoker,hx of etoh use ,denies recreationald rug use  Family hx - non contributory	      Review of Systems:  Other Review of Systems: All other review of systems negative, except as noted in HPI	      Allergies and Intolerances:        Allergies:  	No Known Allergies:   	No Known Allergies:     Home Medications:   * Patient Currently Takes Medications as of 10-Jul-2017 22:20 documented in Prescription Writer  · 	Lantus 100 units/mL subcutaneous solution:  subcutaneous , Last Dose Taken:    · 	oxyCODONE 5 mg oral capsule:  orally , Last Dose Taken:      Patient History:   Tobacco Screening:  · Core Measure Site	No	  · Has the patient used tobacco in the past 30 days?	Yes	  · Tobacco Cessation Education/Counseling	Offered and patient declined	  · Tobacco Cessation Medication	Offered and patient declined	    Risk Assessment:   Present on Admission:  Deep Venous Thrombosis	no	  Pulmonary Embolus	no	    Heart Failure:  Does this patient have a history of or has been diagnosed with heart failure? no.    Hepatitis C Screen (per [facility] Department of Health, hepatitis C screening must be offered to every individual born between 1945 and 1965)	Unable to offer due to clinical condition	      Physical Exam:  Physical Exam: PHYSICAL EXAM:  Daily Height in cm: 167.64 (10 Jul 2017 15:12)   Daily   ICU Vital Signs Last 24 Hrs T(C): 36.8 (10 Jul 2017 19:57), Max: 36.8 (10 Jul 2017 19:57) T(F): 98.3 (10 Jul 2017 19:57), Max: 98.3 (10 Jul 2017 19:57) HR: 90 (10 Jul 2017 19:57) (84 - 99) BP: 141/79 (10 Jul 2017 19:57) (141/79 - 195/101) BP(mean): -- ABP: -- ABP(mean): -- RR: 22 (10 Jul 2017 19:57) (18 - 22) SpO2: 100% (10 Jul 2017 19:57) (100% - 100%)   Constitutional: NAD HEENT: Atraumatic, FLORENCIA, Normal, No congestion Respiratory: decreased breath sounds B/l lung bases Cardiovascular: N S1S2; JOANNA present Gastrointestinal: Abdomen soft, generalised abdominal tenderness with mild distention ,no rebound ,no guarding, Bowel Ssounds present Extremities: No edema, peripheral pulses present, 2+ DP pulses bilaterally; 2+ edema bilaterally Neurological: AAO x 3, no gross focal motor deficits,no nuchal rigidity Skin: Non cellulitic,  Back: No CVA tenderness  Musculoskeletal: non tender Breasts: Deferred Genitourinary: deferred Rectal: Deferred	      Labs and Results:  Labs, Radiology, Cardiology, and Other Results: 10.  16.5  )-----------( 147      ( 10 Jul 2017 15:43 )            32.5    CBC Full  -  ( 10 Jul 2017 15:43 ) WBC Count : 16.5 K/uL Hemoglobin : 10.8 g/dL Hematocrit : 32.5 % Platelet Count - Automated : 147 K/uL Mean Cell Volume : 98.1 fl Mean Cell Hemoglobin : 32.6 pg Mean Cell Hemoglobin Concentration : 33.2 gm/dL Auto Neutrophil # : 15.3 K/uL Auto Lymphocyte # : 0.6 K/uL Auto Monocyte # : 0.4 K/uL Auto Eosinophil # : 0.0 K/uL Auto Basophil # : 0.0 K/uL Auto Neutrophil % : 93.1 % Auto Lymphocyte % : 3.9 % Auto Monocyte % : 2.7 % Auto Eosinophil % : 0.0 % Auto Basophil % : 0.3 %   07-10  137  |  102  |  34<H> ----------------------------<  288<H> 4.3   |  26  |  0.79  Ca    8.4<L>      10 Jul 2017 15:43  TPro  6.0  /  Alb  1.8<L>  /  TBili  1.2  /  DBili  x   /  AST  242<H>  /  ALT  102<H>  /  AlkPhos  738<H>  07-10   LIVER FUNCTIONS - ( 10 Jul 2017 15:43 ) Alb: 1.8 g/dL / Pro: 6.0 gm/dL / ALK PHOS: 738 U/L / ALT: 102 U/L / AST: 242 U/L / GGT: x          PT/INR - ( 10 Jul 2017 15:43 )   PT: 15.1 sec;   INR: 1.39 ratio      PTT - ( 10 Jul 2017 15:43 )  PTT:31.6 sec  CARDIAC MARKERS ( 10 Jul 2017 19:18 ) 0.022 ng/mL / x     / x     / x     / x     CARDIAC MARKERS ( 10 Jul 2017 15:43 ) <0.015 ng/mL / x     / 58 U/L / x     / x        Urinalysis Basic - ( 10 Jul 2017 15:43 )  Color: Yellow / Appearance: Clear / S.020 / pH: x Gluc: x / Ketone: Negative  / Bili: Negative / Urobili: 1 mg/dL  Blood: x / Protein: 30 mg/dL / Nitrite: Negative  Leuk Esterase: Trace / RBC: 3-5 /HPF / WBC 6-10  Sq Epi: x / Non Sq Epi: x / Bacteria: Many	    Assessment and Plan:   Assessment:  · Assessment		   67y M PMH IDDM, Liver CA last chemo per patient was 1 week ago (Dr Baez-oncology at [facility]), Smoking, Prior ETOH, presents to ED BIBEMS after being found  unresponsive and  hypothermic Rectal temp 90F and hypoglycemic,found to be septic     Problem/Plan - 1:  ·  Problem: Sepsis.  Plan: Sepsis /source unclear /DDx includes SBP/cholangitis /right pleural effusion? PNA/? ischemic colitis  Admit to med surg  #IV cefepime,IV vanco  #f/U blood cx ,urine cx  # s/p 2 litres NS IVF- will do slow cautious hydration secondary to portal HTN/ascites to avoid third spacing  # IV albumin infusions   # ID consult  #IR consult to consider paracentesis/right pleural tap?  #Surgery consult (r/o acute cholecystitsi/vs ischemic bowel)-discussed with dr roman -who will see patient this evening  # GI consult.     Problem/Plan - 2:  ·  Problem: Mesenteric vein thrombosis.  Plan: Extensive mesenteric/IVC,portal vein thrombosis likely secondary to hepatocellular CA with peritoneal carcinomatosis  # will avoid anticoagulation ( which is contraindicated in portal vein thrombosis with portal HTN and possible esophagela varices -due to risk of variceal bleedling); ? EGD to determine if varices present if that would impact on whether anti coagulation could be considered    portal vein thrombectomy not probably feasible in this patient; and, if he can not be anti coagulated, would in all likelihood be fruitless    consider supportive or palliative management    Problem/Plan - 3:  ·  Problem: Pleural effusion.  Plan: Right pleural effusion secondary to thrird spacing (hypoalbuminemia)vs metastatic   #IV albumin infusion  #IR consult.     Problem/Plan - 4:  ·  Problem: Hepatocellular carcinoma.  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     Problem/Plan - 5:  ·  Problem: Insulin dependent diabetes mellitus.  Plan: #ISS.     Problem/Plan - 6:  Problem: Essential hypertension. Plan: will avoid BP meds for now due to sepsisand hypotension.    Problem/Plan - 7:  ·  Problem: Prophylactic measure.  Plan: IPC   will avoid heparin for now (due to portal HTN and  risk of variceal bleeding).       Attending Statement:  70 minutes spent on total encounter; more than 50% of the visit was spent counseling and/or coordinating care by the attending physician.    Electronic Signatures:  Ana Gama)  (Signed 10-Jul-2017 23:14)  	Authored: History and Physical, History of Present Illness, Allergies/Medications, Patient History, Risk Assessment, Physical Exam, Labs and Results, Assessment and Plan, Attending Statement      Last Updated: 10-Jul-2017 23:14 by Ana Gama)

## 2017-07-11 NOTE — DIETITIAN INITIAL EVALUATION ADULT. - OTHER INFO
Consult for unintended wt loss. Pt reports decreased PO and vomiting over the past month. NFPE performed pt with moderate to severe muscle wasting and severe fat wasting (noted above). Pt also with gray spotting in eye (bitot's spots?) recommend MD examine to clarify. Pt seen with split dry nails further suggesting poor intake.

## 2017-07-11 NOTE — DIETITIAN INITIAL EVALUATION ADULT. - ADHERENCE
Pt with h/x of DM recent a1c of 6.6. Pt states he has not been checking his BS recently. pt drinks glucerna at home twice a week/fair

## 2017-07-11 NOTE — INPATIENT CERTIFICATION FOR MEDICARE PATIENTS - RISKS OF ADVERSE EVENTS
Concern for neurologic deterioration/Concern for delay in diagnosis and treatment/Concern for worsening infectious process

## 2017-07-11 NOTE — DIETITIAN INITIAL EVALUATION ADULT. - ORAL INTAKE PTA
poor/Pt states appetite is ok, but complains of vomiting food the past couple months. Pt with 35 lb wt loss over the past 7 months (significant wt loss), suggesting poor PO itnake

## 2017-07-11 NOTE — CONSULT NOTE ADULT - SUBJECTIVE AND OBJECTIVE BOX
CC Abd pain   HPI:  67y M PMH IDDM, Liver CA last chemo per patient was 1 week ago (Dr Baez-oncology at [facility]), Smoking, Prior ETOH, presents to   the hospital with confusion and hypoglycemia. The pt has had chronic abd pain -previously on pain meds  Hx liver cancer and follows with Dr. Baez Oncology [facility]  The pt has had prior stenting  The pt has a gi doctor he follows up with  He has had a bm today  no blood in stool  denies vomiting  mild nausea  Has chronic abd pain and today his abd pain as before is mainly upper and is chronic-better than last week he says  denies dysphagia or heartburn  PAST MEDICAL & SURGICAL HISTORY:  Insulin dependent diabetes mellitus  Liver cancer      Allergies    No Known Allergies    Intolerances        MEDICATIONS  (STANDING):  cefepime  IVPB 2000 milliGRAM(s) IV Intermittent every 8 hours  insulin lispro (HumaLOG) corrective regimen sliding scale   SubCutaneous three times a day before meals  dextrose 5%. 1000 milliLiter(s) (50 mL/Hr) IV Continuous <Continuous>  dextrose 50% Injectable 12.5 Gram(s) IV Push once  dextrose 50% Injectable 25 Gram(s) IV Push once  dextrose 50% Injectable 25 Gram(s) IV Push once  pantoprazole  Injectable 40 milliGRAM(s) IV Push daily  dextrose 10% + sodium chloride 0.9%. 1000 milliLiter(s) (60 mL/Hr) IV Continuous <Continuous>    MEDICATIONS  (PRN):  ondansetron Injectable 4 milliGRAM(s) IV Push every 8 hours PRN Nausea  dextrose Gel 1 Dose(s) Oral once PRN Blood Glucose LESS THAN 70 milliGRAM(s)/deciliter  glucagon  Injectable 1 milliGRAM(s) IntraMuscular once PRN Glucose LESS THAN 70 milligrams/deciliter      FAMILY HISTORY:  No pertinent family history in first degree relatives      Social History:    REVIEW OF SYSTEMS      General:	No fever or chills    Skin/Breast: No jaundice or rash   		  ENMT: denies sore throat or thrush    Respiratory and Thorax: Denies dyspnea or cough or shortness of breath  	  Cardiovascular: Denies chest pain or palpitations 	    Gastrointestinal: Denies jaundice or pruritis    Genitourinary: Denies dysuria or hematuria	    Musculoskeletal: Denies muscular pain or swelling	    Neurological: Denies confusion or tremor	    Hematology/Lymphatics: Denies easy bruising or bleeding 	    Endocrine:	Denies polyphagia or polyuria    See above hx otherwise neg for any major organ systems    PHYSICAL EXAM:    Vital Signs Last 24 Hrs  T(C): 36.6 (11 Jul 2017 05:05), Max: 36.8 (10 Jul 2017 19:57)  T(F): 97.8 (11 Jul 2017 05:05), Max: 98.3 (10 Jul 2017 19:57)  HR: 88 (11 Jul 2017 05:05) (81 - 99)  BP: 141/75 (11 Jul 2017 05:05) (121/70 - 195/101)  BP(mean): --  RR: 17 (11 Jul 2017 05:05) (17 - 22)  SpO2: 98% (11 Jul 2017 05:05) (98% - 100%)    Constitutional: no acute distress    ENMT: NC/AT scl anicteric opm pink no lesions     Neck: supple. No jvd or LN    Respiratory: Clear     Cardiovascular: RRR s1s2     Gastrointestinal: Pos bs , soft , tender mainly upper abdomen with hepatomegaly palpated below rt costal margin, mild to mod distension  no r/g        Back: No CVA tenderness    Extremities: NO cce     Neurological: Alert and oriented x 3     Skin: No rash or jaundice     Date/Time:07-11 @ 05:46    Albumin: 2.1  ALT/SGPT: 110  Alk Phos: 815  AST/SGOT: 195  Bilirubin Direct: --  Bilirubin Total: 0.9  Ca: 8.3  eGFR : 110  eGFR Non-: 95  Lipase: --  Amylase: --  INR: 1.42  PTT: --  Date/Time:07-10 @ 15:43    Albumin: 1.8  ALT/SGPT: 102  Alk Phos: 738  AST/SGOT: 242  Bilirubin Direct: --  Bilirubin Total: 1.2  Ca: 8.4  eGFR : 108  eGFR Non-: 94  Lipase: 65  Amylase: --  INR: 1.39  PTT: --      07-11    135  |  101  |  25<H>  ----------------------------<  106<H>  3.7   |  27  |  0.76    Ca    8.3<L>      11 Jul 2017 05:46    TPro  6.3  /  Alb  2.1<L>  /  TBili  0.9  /  DBili  x   /  AST  195<H>  /  ALT  110<H>  /  AlkPhos  815<H>  07-11                            10.5   16.1  )-----------( 130      ( 11 Jul 2017 05:46 )             30.4   < from: CT Abdomen and Pelvis w/ Oral Cont and w/ IV Cont (07.10.17 @ 17:47) >    EXAM:  CT ABDOMEN AND PELVIS OC IC                            PROCEDURE DATE:  07/10/2017          INTERPRETATION:  Clinical Information:Unresponsive, hypothermic, rigid   abdomen  Exam Date: 7/10/2017 5:47 PM  Technique: CT of the abdomen and pelvis was performed following the   administration of oral and IV contrast          with no prior studies   available for comparison .    FINDINGS:    Visualized lung bases: Moderate right pleural effusion with right lower   lobe compressive atelectasis.    Liver: Large hypovascular mass which appears to be emanating from the   medial right hepatic lobe and caudate lobe extending into the posterior   aspect of the left hepatic lobe measuring at least 10.0 x 9.3 cm. A   posterior inferior right hepatic hypovascular lesion measuring 3.1 x 2.5   cm. Additional smaller scattered hepatic lesions. Sclerotic features of   the liver. Therefore findings may suggest multifocal HCC or a single   large HCC with internal hepatic metastasis. Additional fillingdefects   seen extending into the suprahepatic IVC as well as distending and   enlarging the main portal vein to the level the portal confluence and   proximal splenic vein and superior mesenteric vein compatible with tumor   thrombus. Cavernous transformation of the portal vein through collaterals   is identified. A replaced right hepatic artery is visualized.   Paraesophageal varices and gastric varices.    Gallbladder: Distended gallbladder. Tiny droplets of air within the   gallbladder lumen.  Bile ducts: Pneumobilia. Common bile duct stent extending into the right   intrahepatic biliary radicles. Mild dilatation of the left intrahepatic   biliary tree.  Pancreas: within normal limits    Spleen: within normal limits  Adrenal: Nodular thickening of the bilateral adrenal glands suggesting   metastasis.    Kidneys, Ureters and Bladder:: Kidneys enhance bilaterally and   symmetrically without hydronephrosis. Subcentimeter hypodensity in the   posterior right mid to upper pole too small tocharacterize. No   intrarenal calculi. Nonspecific bilateral perinephric stranding. The   ureters and bladder are within normal limits.        Pelvis: Mild pelvic ascites. No pelvic adenopathy.    Bowel: The bowel is of normal course and caliber without evidence of   obstruction or gross bowel wall thickening. Normal appendix visualized.    Peritoneum: Mild abdominal pelvic ascites. Suggestion of nodular   thickening of the omentum which may represent peritoneal carcinomatosis.   No free air. No drainable fluid collections.    Retroperitoneum:     Subcentimeter retroperitoneal lymph nodes   non-specific in nature   Vessels: Atherosclerotic changes.        Abdominal wall: Mild anasarca. Left inguinal hernia containing ascites.  Bones: Degenerative changes. .        IMPRESSION:    Large hepatic mass involving the caudate lobe and right hepatic lobe with   tumor thrombus within the main portal vein and proximal splenic and   superior mesenteric vein with additional thrombus seen into the   suprahepatic IVC just proximal to the right atrium, with numerous   additional hepatic lesions in the setting of a cirrhotic liver suggesting   metastatic hepatocellular carcinoma.    Large paraesophageal and gastric varices.    Mild abdominal and pelvic ascites with suggestion of carcinomatosis.    Right pleural effusion.    Common bile duct stent visualized with left-sided biliary dilatation.                RICARDO ZEPEDA M.D., ATTENDING RADIOLOGIST  This document has been electronically signed. Jul 10 2017  5:57PM                < end of copied text >

## 2017-07-11 NOTE — DIETITIAN INITIAL EVALUATION ADULT. - NS AS NUTRI DX NUTRIENT
Inadequate protein-energy intake/Pt meets criteria for severe protein-calorie malnutrition/Malnutrition

## 2017-07-11 NOTE — PROGRESS NOTE ADULT - ASSESSMENT
67/M admitted with     Problem/Plan - 1:  ·  Problem: Sepsis.  Plan: Sepsis /source unclear /DDx includes SBP/cholangitis /right pleural effusion? PNA/? ischemic colitis  Admit to med surg  cont cefepime and gent as per ID  #f/U blood cx ,urine cx    Problem/Plan - 2:  ·  Problem: Mesenteric vein thrombosis.  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  Discussed with IR, not a candidate for thrombolysis or thrombectomy.   start heparin drip, discussed with pt.      Problem/Plan - 3:  ·  Problem: Pleural effusion.  Plan: Right pleural effusion secondary to third spacing (hypoalbuminemia)vs metastatic   #IR consult for possible thoracentesis    Problem/Plan - 4:  ·  Problem: Hepatocellular carcinoma.  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     Problem/Plan - 5:  ·  Problem: Insulin dependent diabetes mellitus.  Plan: #ISS.     Problem/Plan - 6:  Problem: Essential hypertension. Plan: will avoid BP meds for now due to sepsis and hypotension.    poc discussed with pt, team, IR    Prognosis: Very Poor    Code: Full Code 67/M admitted with     Problem/Plan - 1:  ·  Problem: Sepsis.  Plan: Sepsis /source unclear /DDx includes SBP/cholangitis /right pleural effusion? PNA/? ischemic colitis  Admit to med surg  cont cefepime and gent as per ID  #f/U blood cx ,urine cx    Problem/Plan - 2:  ·  Problem: Mesenteric vein thrombosis.  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  Discussed with IR, not a candidate for thrombolysis or thrombectomy.   start heparin drip, discussed with pt.      Problem/Plan - 3:  ·  Problem: Pleural effusion.  Plan: Right pleural effusion secondary to third spacing (hypoalbuminemia)vs metastatic   #IR consult for possible thoracentesis    Problem/Plan - 4:  ·  Problem: Hepatocellular carcinoma.  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     Problem/Plan - 5:  ·  Problem: Insulin dependent diabetes mellitus + Hypoglycemia: cont D10 drip, check FS q hourly.     Problem/Plan - 6:  Problem: Essential hypertension. Plan: will avoid BP meds for now due to sepsis and hypotension.    poc discussed with pt, team, IR    Prognosis: Very Poor    Code: Full Code

## 2017-07-11 NOTE — PROGRESS NOTE ADULT - SUBJECTIVE AND OBJECTIVE BOX
HPI:  67y M PMH IDDM, Liver CA last chemo per patient was 1 week ago (Dr Baez-oncology at [place]), Smoking, Prior ETOH, presents to ED BIBEMS after being found  unresponsive and was found to be hypothermic Rectal temp 90F and hypoglycemic   Fingerstick on arrival in ED 24, received 2 amps D50, subsequent fingerstick 118, repeated 10 minutes later, 118.  Denies fevers, but states he feels chills and nausea.  Rectal temp in room 90.  Denies vomiting, diarrhea, headache, SOB or cp, however family state he was found in a "whole lot of liquid" in his bed.  Pt reports feeling confused.  Reports having decreased PO intake for several days.  Denies trauma or travel.  Patient and family are unsure of what medications he takes.  His normal routine care is through [facility].  Patient reports chronic abdominal pain for which he takes oxycontin at home, unsure of dose . Denies any new or worsening of his chronic abdominal pain     : c/o abd pain, gen  FS still running low      PHYSICAL EXAM:    Daily     Daily Weight in k.1 (2017 10:55)    ICU Vital Signs Last 24 Hrs  T(C): 36.7 (2017 11:51), Max: 36.8 (10 Jul 2017 19:57)  T(F): 98.1 (2017 11:51), Max: 98.3 (10 Jul 2017 19:57)  HR: 106 (2017 11:51) (81 - 106)  BP: 158/72 (2017 11:51) (121/70 - 162/89)  BP(mean): --  ABP: --  ABP(mean): --  RR: 18 (2017 11:51) (17 - 22)  SpO2: 97% (2017 11:51) (97% - 100%)      Constitutional: Ill appearing  HEENT: Atraumatic, FLORENCIA, Normal, No congestion  Respiratory: Breath Sounds normal, no rhonchi/wheeze  Cardiovascular: N S1S2; JOANNA present  Gastrointestinal: Ascitic Abdomen,Gen tenderness present  Extremities: No edema, peripheral pulses present  Neurological: AAO x 3, no gross focal motor deficits  Skin: Non cellulitic, no rash, ulcers  Lymph Nodes: No lymphadenopathy noted  Back: No CVA tenderness   Musculoskeletal: non tender  Breasts: Deferred  Genitourinary: deferred  Rectal: Deferred                          10.5   16.1  )-----------( 130      ( 2017 05:46 )             30.4       CBC Full  -  ( 2017 05:46 )  WBC Count : 16.1 K/uL  Hemoglobin : 10.5 g/dL  Hematocrit : 30.4 %  Platelet Count - Automated : 130 K/uL  Mean Cell Volume : 96.4 fl  Mean Cell Hemoglobin : 33.4 pg  Mean Cell Hemoglobin Concentration : 34.6 gm/dL  Auto Neutrophil # : 14.6 K/uL  Auto Lymphocyte # : 0.9 K/uL  Auto Monocyte # : 0.6 K/uL  Auto Eosinophil # : 0.0 K/uL  Auto Basophil # : 0.0 K/uL  Auto Neutrophil % : 90.7 %  Auto Lymphocyte % : 5.5 %  Auto Monocyte % : 3.4 %  Auto Eosinophil % : 0.1 %  Auto Basophil % : 0.3 %          135  |  101  |  25<H>  ----------------------------<  106<H>  3.7   |  27  |  0.76    Ca    8.3<L>      2017 05:46    TPro  6.3  /  Alb  2.1<L>  /  TBili  0.9  /  DBili  x   /  AST  195<H>  /  ALT  110<H>  /  AlkPhos  815<H>        LIVER FUNCTIONS - ( 2017 05:46 )  Alb: 2.1 g/dL / Pro: 6.3 gm/dL / ALK PHOS: 815 U/L / ALT: 110 U/L / AST: 195 U/L / GGT: x             PT/INR - ( 2017 05:46 )   PT: 15.4 sec;   INR: 1.42 ratio         PTT - ( 2017 05:46 )  PTT:29.0 sec    CARDIAC MARKERS ( 10 Jul 2017 23:29 )  0.015 ng/mL / x     / x     / x     / x      CARDIAC MARKERS ( 10 Jul 2017 19:18 )  0.022 ng/mL / x     / x     / x     / x      CARDIAC MARKERS ( 10 Jul 2017 15:43 )  <0.015 ng/mL / x     / 58 U/L / x     / x            Urinalysis Basic - ( 10 Jul 2017 15:43 )    Color: Yellow / Appearance: Clear / S.020 / pH: x  Gluc: x / Ketone: Negative  / Bili: Negative / Urobili: 1 mg/dL   Blood: x / Protein: 30 mg/dL / Nitrite: Negative   Leuk Esterase: Trace / RBC: 3-5 /HPF / WBC 6-10   Sq Epi: x / Non Sq Epi: x / Bacteria: Many            MEDICATIONS  (STANDING):  insulin lispro (HumaLOG) corrective regimen sliding scale   SubCutaneous three times a day before meals  dextrose 5%. 1000 milliLiter(s) (50 mL/Hr) IV Continuous <Continuous>  dextrose 50% Injectable 12.5 Gram(s) IV Push once  dextrose 50% Injectable 25 Gram(s) IV Push once  dextrose 50% Injectable 25 Gram(s) IV Push once  pantoprazole  Injectable 40 milliGRAM(s) IV Push daily  cefepime  IVPB 2000 milliGRAM(s) IV Intermittent every 12 hours  heparin  Infusion.  Unit(s)/Hr (11 mL/Hr) IV Continuous <Continuous>  heparin  Injectable 4500 Unit(s) IV Push once  dextrose 10%. 1000 milliLiter(s) (100 mL/Hr) IV Continuous <Continuous>  furosemide    Tablet 20 milliGRAM(s) Oral daily  dextrose 50% Injectable 25 Gram(s) IV Push once

## 2017-07-12 LAB
ALBUMIN SERPL ELPH-MCNC: 1.8 G/DL — LOW (ref 3.3–5)
ALP SERPL-CCNC: 825 U/L — HIGH (ref 40–120)
ALT FLD-CCNC: 107 U/L — HIGH (ref 12–78)
ANION GAP SERPL CALC-SCNC: 8 MMOL/L — SIGNIFICANT CHANGE UP (ref 5–17)
APTT BLD: 31 SEC — SIGNIFICANT CHANGE UP (ref 27.5–37.4)
APTT BLD: 43.5 SEC — HIGH (ref 27.5–37.4)
APTT BLD: 53.6 SEC — HIGH (ref 27.5–37.4)
AST SERPL-CCNC: 154 U/L — HIGH (ref 15–37)
BILIRUB SERPL-MCNC: 0.9 MG/DL — SIGNIFICANT CHANGE UP (ref 0.2–1.2)
BUN SERPL-MCNC: 20 MG/DL — SIGNIFICANT CHANGE UP (ref 7–23)
CALCIUM SERPL-MCNC: 7.9 MG/DL — LOW (ref 8.5–10.1)
CHLORIDE SERPL-SCNC: 101 MMOL/L — SIGNIFICANT CHANGE UP (ref 96–108)
CO2 SERPL-SCNC: 25 MMOL/L — SIGNIFICANT CHANGE UP (ref 22–31)
CREAT SERPL-MCNC: 0.72 MG/DL — SIGNIFICANT CHANGE UP (ref 0.5–1.3)
CULTURE RESULTS: SIGNIFICANT CHANGE UP
GLUCOSE SERPL-MCNC: 86 MG/DL — SIGNIFICANT CHANGE UP (ref 70–99)
GRAM STN FLD: SIGNIFICANT CHANGE UP
GRAM STN FLD: SIGNIFICANT CHANGE UP
HCT VFR BLD CALC: 31.3 % — LOW (ref 39–50)
HCT VFR BLD CALC: 32.2 % — LOW (ref 39–50)
HGB BLD-MCNC: 10.5 G/DL — LOW (ref 13–17)
HGB BLD-MCNC: 10.9 G/DL — LOW (ref 13–17)
INR BLD: 1.52 RATIO — HIGH (ref 0.88–1.16)
MCHC RBC-ENTMCNC: 31.8 PG — SIGNIFICANT CHANGE UP (ref 27–34)
MCHC RBC-ENTMCNC: 32.2 PG — SIGNIFICANT CHANGE UP (ref 27–34)
MCHC RBC-ENTMCNC: 33.6 GM/DL — SIGNIFICANT CHANGE UP (ref 32–36)
MCHC RBC-ENTMCNC: 33.7 GM/DL — SIGNIFICANT CHANGE UP (ref 32–36)
MCV RBC AUTO: 94.7 FL — SIGNIFICANT CHANGE UP (ref 80–100)
MCV RBC AUTO: 95.6 FL — SIGNIFICANT CHANGE UP (ref 80–100)
NIGHT BLUE STAIN TISS: SIGNIFICANT CHANGE UP
PLATELET # BLD AUTO: 113 K/UL — LOW (ref 150–400)
PLATELET # BLD AUTO: 84 K/UL — LOW (ref 150–400)
POTASSIUM SERPL-MCNC: 3.4 MMOL/L — LOW (ref 3.5–5.3)
POTASSIUM SERPL-SCNC: 3.4 MMOL/L — LOW (ref 3.5–5.3)
PROT SERPL-MCNC: 5.5 GM/DL — LOW (ref 6–8.3)
PROTHROM AB SERPL-ACNC: 16.6 SEC — HIGH (ref 9.8–12.7)
RBC # BLD: 3.3 M/UL — LOW (ref 4.2–5.8)
RBC # BLD: 3.37 M/UL — LOW (ref 4.2–5.8)
RBC # FLD: 14 % — SIGNIFICANT CHANGE UP (ref 10.3–14.5)
RBC # FLD: 14.2 % — SIGNIFICANT CHANGE UP (ref 10.3–14.5)
SODIUM SERPL-SCNC: 134 MMOL/L — LOW (ref 135–145)
SPECIMEN SOURCE: SIGNIFICANT CHANGE UP
WBC # BLD: 13.7 K/UL — HIGH (ref 3.8–10.5)
WBC # BLD: 13.7 K/UL — HIGH (ref 3.8–10.5)
WBC # FLD AUTO: 13.7 K/UL — HIGH (ref 3.8–10.5)
WBC # FLD AUTO: 13.7 K/UL — HIGH (ref 3.8–10.5)

## 2017-07-12 PROCEDURE — 47490 INCISION OF GALLBLADDER: CPT

## 2017-07-12 PROCEDURE — 49083 ABD PARACENTESIS W/IMAGING: CPT

## 2017-07-12 PROCEDURE — 88305 TISSUE EXAM BY PATHOLOGIST: CPT | Mod: 26

## 2017-07-12 PROCEDURE — 88108 CYTOPATH CONCENTRATE TECH: CPT | Mod: 26

## 2017-07-12 RX ORDER — DEXTROSE 50 % IN WATER 50 %
50 SYRINGE (ML) INTRAVENOUS
Qty: 0 | Refills: 0 | Status: DISCONTINUED | OUTPATIENT
Start: 2017-07-12 | End: 2017-07-15

## 2017-07-12 RX ORDER — ONDANSETRON 8 MG/1
4 TABLET, FILM COATED ORAL ONCE
Qty: 0 | Refills: 0 | Status: DISCONTINUED | OUTPATIENT
Start: 2017-07-12 | End: 2017-07-12

## 2017-07-12 RX ORDER — OXYCODONE HYDROCHLORIDE 5 MG/1
5 TABLET ORAL EVERY 4 HOURS
Qty: 0 | Refills: 0 | Status: DISCONTINUED | OUTPATIENT
Start: 2017-07-12 | End: 2017-07-12

## 2017-07-12 RX ORDER — OXYCODONE HYDROCHLORIDE 5 MG/1
15 TABLET ORAL EVERY 4 HOURS
Qty: 0 | Refills: 0 | Status: DISCONTINUED | OUTPATIENT
Start: 2017-07-12 | End: 2017-07-13

## 2017-07-12 RX ORDER — FENTANYL CITRATE 50 UG/ML
50 INJECTION INTRAVENOUS
Qty: 0 | Refills: 0 | Status: DISCONTINUED | OUTPATIENT
Start: 2017-07-12 | End: 2017-07-12

## 2017-07-12 RX ORDER — METRONIDAZOLE 500 MG
500 TABLET ORAL EVERY 8 HOURS
Qty: 0 | Refills: 0 | Status: DISCONTINUED | OUTPATIENT
Start: 2017-07-12 | End: 2017-07-15

## 2017-07-12 RX ORDER — SODIUM CHLORIDE 9 MG/ML
1000 INJECTION INTRAMUSCULAR; INTRAVENOUS; SUBCUTANEOUS
Qty: 0 | Refills: 0 | Status: DISCONTINUED | OUTPATIENT
Start: 2017-07-12 | End: 2017-07-12

## 2017-07-12 RX ORDER — OXYCODONE HYDROCHLORIDE 5 MG/1
10 TABLET ORAL EVERY 4 HOURS
Qty: 0 | Refills: 0 | Status: DISCONTINUED | OUTPATIENT
Start: 2017-07-12 | End: 2017-07-13

## 2017-07-12 RX ADMIN — OXYCODONE HYDROCHLORIDE 10 MILLIGRAM(S): 5 TABLET ORAL at 21:58

## 2017-07-12 RX ADMIN — Medication 20 MILLIGRAM(S): at 07:03

## 2017-07-12 RX ADMIN — HEPARIN SODIUM 2000 UNIT(S): 5000 INJECTION INTRAVENOUS; SUBCUTANEOUS at 00:47

## 2017-07-12 RX ADMIN — CEFEPIME 100 MILLIGRAM(S): 1 INJECTION, POWDER, FOR SOLUTION INTRAMUSCULAR; INTRAVENOUS at 08:25

## 2017-07-12 RX ADMIN — HEPARIN SODIUM 1200 UNIT(S)/HR: 5000 INJECTION INTRAVENOUS; SUBCUTANEOUS at 00:33

## 2017-07-12 RX ADMIN — CEFEPIME 100 MILLIGRAM(S): 1 INJECTION, POWDER, FOR SOLUTION INTRAMUSCULAR; INTRAVENOUS at 21:59

## 2017-07-12 RX ADMIN — HEPARIN SODIUM 1300 UNIT(S)/HR: 5000 INJECTION INTRAVENOUS; SUBCUTANEOUS at 15:00

## 2017-07-12 RX ADMIN — Medication 100 MILLIGRAM(S): at 21:59

## 2017-07-12 RX ADMIN — Medication 100 MILLILITER(S): at 09:22

## 2017-07-12 RX ADMIN — HEPARIN SODIUM 2000 UNIT(S): 5000 INJECTION INTRAVENOUS; SUBCUTANEOUS at 15:06

## 2017-07-12 RX ADMIN — Medication 100 MILLIGRAM(S): at 15:09

## 2017-07-12 RX ADMIN — Medication 12.5 GRAM(S): at 00:05

## 2017-07-12 RX ADMIN — Medication 100 MILLIGRAM(S): at 09:58

## 2017-07-12 NOTE — PROGRESS NOTE ADULT - SUBJECTIVE AND OBJECTIVE BOX
Patient is a 67y old  Male who presents with a chief complaint of unresponsiveness (10 Jul 2017 22:50)    HPI:  66 y/o Male with h/o IDDM, Liver CA s/p chemo (last cycle 1 week PTA with Dr Baez-oncology at [place]), chronic pain was admitted on 7/10 for confusion, hypoglycemia and hypothermia. He was BIBEMS after being found  unresponsive and hypothermic with rectal temp 90F. Denies fevers, but states he feels chills and nausea. He may have had diarrhea since family states he was found in a "whole lot of liquid" in his bed. Pt reports feeling confused.  Reports having decreased PO intake for several days. Patient reports chronic abdominal pain for which he takes oxycontin at home. In ER, he received vancomycin IV and cefepime.     Weak looking  No fever or chills  Abdomen feels distended    MEDICATIONS  (STANDING):  insulin lispro (HumaLOG) corrective regimen sliding scale   SubCutaneous three times a day before meals  dextrose 50% Injectable 12.5 Gram(s) IV Push once  dextrose 50% Injectable 25 Gram(s) IV Push once  dextrose 50% Injectable 25 Gram(s) IV Push once  pantoprazole  Injectable 40 milliGRAM(s) IV Push daily  cefepime  IVPB 2000 milliGRAM(s) IV Intermittent every 12 hours  heparin  Infusion.  Unit(s)/Hr (11 mL/Hr) IV Continuous <Continuous>  heparin  Injectable 4500 Unit(s) IV Push once  dextrose 10%. 1000 milliLiter(s) (100 mL/Hr) IV Continuous <Continuous>  furosemide    Tablet 20 milliGRAM(s) Oral daily  metroNIDAZOLE  IVPB 500 milliGRAM(s) IV Intermittent every 8 hours    MEDICATIONS  (PRN):  ondansetron Injectable 4 milliGRAM(s) IV Push every 8 hours PRN Nausea  dextrose Gel 1 Dose(s) Oral once PRN Blood Glucose LESS THAN 70 milliGRAM(s)/deciliter  glucagon  Injectable 1 milliGRAM(s) IntraMuscular once PRN Glucose LESS THAN 70 milligrams/deciliter  heparin  Injectable 4500 Unit(s) IV Push every 6 hours PRN For aPTT less than 40  heparin  Injectable 2000 Unit(s) IV Push every 6 hours PRN For aPTT between 40 - 57      Vital Signs Last 24 Hrs  T(C): 36.4 (2017 05:45), Max: 37.1 (2017 19:15)  T(F): 97.6 (2017 05:45), Max: 98.7 (2017 19:15)  HR: 87 (2017 07:00) (85 - 106)  BP: 143/74 (2017 07:00) (114/65 - 158/72)  BP(mean): 88 (2017 07:00) (76 - 98)  RR: 17 (2017 07:00) (15 - 21)  SpO2: 96% (2017 07:00) (95% - 98%)    Physical Exam:        Constitutional: frail looking  HEENT: NC/AT, EOMI, PERRLA  Neck: supple  Back: no tenderness  Respiratory: clear  Cardiovascular: S1S2 regular, no murmurs  Abdomen: soft, not tender, distended, positive BS  Genitourinary: deferred  Rectal: deferred  Musculoskeletal: no muscle tenderness, no joint swelling or tenderness  Extremities: no pedal edema  Neurological: AxOx2, moving all extremities, no focal deficits  Skin: no rashes    Labs:                        10.9   13.7  )-----------( 84       ( 2017 05:02 )             32.2     07-12    134<L>  |  101  |  20  ----------------------------<  86  3.4<L>   |  25  |  0.72    Ca    7.9<L>      2017 05:02    TPro  5.5<L>  /  Alb  1.8<L>  /  TBili  0.9  /  DBili  x   /  AST  154<H>  /  ALT  107<H>  /  AlkPhos  825<H>                              10.5   16.1  )-----------( 130      ( 2017 05:46 )             30.4     07-11    135  |  101  |  25<H>  ----------------------------<  106<H>  3.7   |  27  |  0.76    Ca    8.3<L>      2017 05:46    TPro  6.3  /  Alb  2.1<L>  /  TBili  0.9  /  DBili  x   /  AST  195<H>  /  ALT  110<H>  /  AlkPhos  815<H>  07-     LIVER FUNCTIONS - ( 2017 05:46 )  Alb: 2.1 g/dL / Pro: 6.3 gm/dL / ALK PHOS: 815 U/L / ALT: 110 U/L / AST: 195 U/L / GGT: x           Urinalysis Basic - ( 10 Jul 2017 15:43 )    Color: Yellow / Appearance: Clear / S.020 / pH: x  Gluc: x / Ketone: Negative  / Bili: Negative / Urobili: 1 mg/dL   Blood: x / Protein: 30 mg/dL / Nitrite: Negative   Leuk Esterase: Trace / RBC: 3-5 /HPF / WBC 6-10   Sq Epi: x / Non Sq Epi: x / Bacteria: Many    Culture - Blood (07.10.17 @ 15:43)    Gram Stain:   Growth in anaerobic bottle: Gram Negative Rods    Specimen Source: .Blood None    Culture Results:   Growth in anaerobic bottle: Gram Negative Rods  ***Blood Panel PCR results on this specimen are available  approximately 3 hours after the Gram stain result.***  Gram stain, PCR, and/or culture results may not always  correspond due to difference in methodologies.    Culture - Blood (07.10.17 @ 15:43)    Gram Stain:   Growth in aerobic and anaerobic bottles: Gram Negative Rods    Specimen Source: .Blood None    Culture Results:   Growth in aerobic and anaerobic bottles: Gram Negative Rods  See previous culture [id number]          Radiology:    < from: CT Abdomen and Pelvis w/ Oral Cont and w/ IV Cont (07.10.17 @ 17:47) >  Large hepatic mass involving the caudate lobe and right hepatic lobe with   tumor thrombus within the main portal vein and proximal splenic and   Culture - Urine (07.10.17 @ 15:43)    Specimen Source: .Urine None    Culture Results:   <10,000 CFU/ml Normal Urogenital lenin present    superior mesenteric vein with additional thrombus seen into the   suprahepatic IVC just proximal to the right atrium, with numerous   additional hepatic lesions in the setting of a cirrhotic liver suggesting   metastatic hepatocellular carcinoma.    Large paraesophageal and gastric varices.    Mild abdominal and pelvic ascites with suggestion of carcinomatosis.    Right pleural effusion.    Common bile duct stent visualized with left-sided biliary dilatation.        Advanced directives addressed: full resuscitation  Advanced care planning and DNR :   discussion and decision planning about the patient's care in case  he cannot speak for himself. His personal wishes were discussed with his health care proxy, family.   discussed with the patient and the patient health care proxy   30 min. face to face contact with the patient. Patient is a 67y old  Male who presents with a chief complaint of unresponsiveness (10 Jul 2017 22:50)    HPI:  68 y/o Male with h/o IDDM, Liver CA s/p chemo (last cycle 1 week PTA with Dr Baez-oncology at [place]), chronic pain was admitted on 7/10 for confusion, hypoglycemia and hypothermia. He was BIBEMS after being found  unresponsive and hypothermic with rectal temp 90F. Denies fevers, but states he feels chills and nausea. He may have had diarrhea since family states he was found in a "whole lot of liquid" in his bed. Pt reports feeling confused.  Reports having decreased PO intake for several days. Patient reports chronic abdominal pain for which he takes oxycontin at home. In ER, he received vancomycin IV and cefepime.     Weak looking  No fever or chills  Abdomen feels distended    MEDICATIONS  (STANDING):  insulin lispro (HumaLOG) corrective regimen sliding scale   SubCutaneous three times a day before meals  dextrose 50% Injectable 12.5 Gram(s) IV Push once  dextrose 50% Injectable 25 Gram(s) IV Push once  dextrose 50% Injectable 25 Gram(s) IV Push once  pantoprazole  Injectable 40 milliGRAM(s) IV Push daily  cefepime  IVPB 2000 milliGRAM(s) IV Intermittent every 12 hours  heparin  Infusion.  Unit(s)/Hr (11 mL/Hr) IV Continuous <Continuous>  heparin  Injectable 4500 Unit(s) IV Push once  dextrose 10%. 1000 milliLiter(s) (100 mL/Hr) IV Continuous <Continuous>  furosemide    Tablet 20 milliGRAM(s) Oral daily  metroNIDAZOLE  IVPB 500 milliGRAM(s) IV Intermittent every 8 hours    MEDICATIONS  (PRN):  ondansetron Injectable 4 milliGRAM(s) IV Push every 8 hours PRN Nausea  dextrose Gel 1 Dose(s) Oral once PRN Blood Glucose LESS THAN 70 milliGRAM(s)/deciliter  glucagon  Injectable 1 milliGRAM(s) IntraMuscular once PRN Glucose LESS THAN 70 milligrams/deciliter  heparin  Injectable 4500 Unit(s) IV Push every 6 hours PRN For aPTT less than 40  heparin  Injectable 2000 Unit(s) IV Push every 6 hours PRN For aPTT between 40 - 57      Vital Signs Last 24 Hrs  T(C): 36.4 (2017 05:45), Max: 37.1 (2017 19:15)  T(F): 97.6 (2017 05:45), Max: 98.7 (2017 19:15)  HR: 87 (2017 07:00) (85 - 106)  BP: 143/74 (2017 07:00) (114/65 - 158/72)  BP(mean): 88 (2017 07:00) (76 - 98)  RR: 17 (2017 07:00) (15 - 21)  SpO2: 96% (2017 07:00) (95% - 98%)    Physical Exam:        Constitutional: frail looking  HEENT: NC/AT, EOMI, PERRLA  Neck: supple  Back: no tenderness  Respiratory: clear  Cardiovascular: S1S2 regular, no murmurs  Abdomen: soft, not tender, distended, positive BS  Genitourinary: deferred  Rectal: deferred  Musculoskeletal: no muscle tenderness, no joint swelling or tenderness  Extremities: no pedal edema  Neurological: AxOx2, moving all extremities, no focal deficits  Skin: no rashes    Labs:                        10.9   13.7  )-----------( 84       ( 2017 05:02 )             32.2     07-12    134<L>  |  101  |  20  ----------------------------<  86  3.4<L>   |  25  |  0.72    Ca    7.9<L>      2017 05:02    TPro  5.5<L>  /  Alb  1.8<L>  /  TBili  0.9  /  DBili  x   /  AST  154<H>  /  ALT  107<H>  /  AlkPhos  825<H>                              10.5   16.1  )-----------( 130      ( 2017 05:46 )             30.4     07-11    135  |  101  |  25<H>  ----------------------------<  106<H>  3.7   |  27  |  0.76    Ca    8.3<L>      2017 05:46    TPro  6.3  /  Alb  2.1<L>  /  TBili  0.9  /  DBili  x   /  AST  195<H>  /  ALT  110<H>  /  AlkPhos  815<H>  07-     LIVER FUNCTIONS - ( 2017 05:46 )  Alb: 2.1 g/dL / Pro: 6.3 gm/dL / ALK PHOS: 815 U/L / ALT: 110 U/L / AST: 195 U/L / GGT: x           Urinalysis Basic - ( 10 Jul 2017 15:43 )    Color: Yellow / Appearance: Clear / S.020 / pH: x  Gluc: x / Ketone: Negative  / Bili: Negative / Urobili: 1 mg/dL   Blood: x / Protein: 30 mg/dL / Nitrite: Negative   Leuk Esterase: Trace / RBC: 3-5 /HPF / WBC 6-10   Sq Epi: x / Non Sq Epi: x / Bacteria: Many    Culture - Blood (07.10.17 @ 15:43)    Gram Stain:   Growth in anaerobic bottle: Gram Negative Rods    Specimen Source: .Blood None    Culture Results:   Growth in anaerobic bottle: Gram Negative Rods  ***Blood Panel PCR results on this specimen are available  approximately 3 hours after the Gram stain result.***  Gram stain, PCR, and/or culture results may not always  correspond due to difference in methodologies.    Culture - Blood (07.10.17 @ 15:43)    Gram Stain:   Growth in aerobic and anaerobic bottles: Gram Negative Rods    Specimen Source: .Blood None    Culture Results:   Growth in aerobic and anaerobic bottles: Gram Negative Rods  See previous culture [id number]          Radiology:    < from: CT Abdomen and Pelvis w/ Oral Cont and w/ IV Cont (07.10.17 @ 17:47) >  Large hepatic mass involving the caudate lobe and right hepatic lobe with   tumor thrombus within the main portal vein and proximal splenic and   Culture - Urine (07.10.17 @ 15:43)    Specimen Source: .Urine None    Culture Results:   <10,000 CFU/ml Normal Urogenital lenin present    superior mesenteric vein with additional thrombus seen into the   suprahepatic IVC just proximal to the right atrium, with numerous   additional hepatic lesions in the setting of a cirrhotic liver suggesting   metastatic hepatocellular carcinoma.    Large paraesophageal and gastric varices.    Mild abdominal and pelvic ascites with suggestion of carcinomatosis.    Right pleural effusion.    Common bile duct stent visualized with left-sided biliary dilatation.        Advanced directives addressed: full resuscitation  Advanced care planning and DNR :   discussion and decision planning about the patient's care in case  he cannot speak for himself.   discussed with the patient; no health care proxy available at this time  30 min. face to face contact with the patient.

## 2017-07-12 NOTE — PROGRESS NOTE ADULT - SUBJECTIVE AND OBJECTIVE BOX
feels better since perc drain placement  no abd pain  no n/v      Allergies    No Known Allergies    Intolerances        MEDICATIONS  (STANDING):  insulin lispro (HumaLOG) corrective regimen sliding scale   SubCutaneous three times a day before meals  dextrose 50% Injectable 12.5 Gram(s) IV Push once  dextrose 50% Injectable 25 Gram(s) IV Push once  dextrose 50% Injectable 25 Gram(s) IV Push once  pantoprazole  Injectable 40 milliGRAM(s) IV Push daily  cefepime  IVPB 2000 milliGRAM(s) IV Intermittent every 12 hours  heparin  Infusion.  Unit(s)/Hr (11 mL/Hr) IV Continuous <Continuous>  heparin  Injectable 4500 Unit(s) IV Push once  dextrose 10%. 1000 milliLiter(s) (100 mL/Hr) IV Continuous <Continuous>  furosemide    Tablet 20 milliGRAM(s) Oral daily  metroNIDAZOLE  IVPB 500 milliGRAM(s) IV Intermittent every 8 hours  sodium chloride 0.9%. 1000 milliLiter(s) (50 mL/Hr) IV Continuous <Continuous>    MEDICATIONS  (PRN):  ondansetron Injectable 4 milliGRAM(s) IV Push every 8 hours PRN Nausea  dextrose Gel 1 Dose(s) Oral once PRN Blood Glucose LESS THAN 70 milliGRAM(s)/deciliter  glucagon  Injectable 1 milliGRAM(s) IntraMuscular once PRN Glucose LESS THAN 70 milligrams/deciliter  heparin  Injectable 4500 Unit(s) IV Push every 6 hours PRN For aPTT less than 40  heparin  Injectable 2000 Unit(s) IV Push every 6 hours PRN For aPTT between 40 - 57  dextrose 50% Injectable 50 milliLiter(s) IV Push every 1 hour PRN Blood sugar less than 60  oxyCODONE    IR 10 milliGRAM(s) Oral every 4 hours PRN Moderate Pain (4 - 6)  oxyCODONE    IR 15 milliGRAM(s) Oral every 4 hours PRN Severe Pain (7 - 10)  fentaNYL    Injectable 50 MICROGram(s) IV Push every 5 minutes PRN Severe Pain  oxyCODONE    IR 5 milliGRAM(s) Oral every 4 hours PRN For moderate pain  ondansetron Injectable 4 milliGRAM(s) IV Push once PRN Nausea and/or Vomiting      REVIEW OF SYSTEMS      General:	No fever or chills    Skin/Breast: No jaundice or rash   		  ENMT: denies sore throat or thrush    Respiratory and Thorax: Denies dyspnea or cough or shortness of breath  	  Cardiovascular: Denies chest pain or palpitations 	    Gastrointestinal: Denies jaundice or pruritis    Genitourinary: Denies dysuria or hematuria	    Musculoskeletal: Denies muscular pain or swelling	    Neurological: Denies confusion or tremor	      Endocrine:	Denies polyphagia or polyuria    See above hx otherwise neg for any major organ systems    PHYSICAL EXAM:    Vital Signs Last 24 Hrs  T(C): 36.7 (12 Jul 2017 14:20), Max: 37.1 (11 Jul 2017 19:15)  T(F): 98 (12 Jul 2017 14:20), Max: 98.7 (11 Jul 2017 19:15)  HR: 84 (12 Jul 2017 14:20) (84 - 98)  BP: 142/82 (12 Jul 2017 14:20) (114/65 - 151/81)  BP(mean): 82 (12 Jul 2017 11:00) (76 - 98)  RR: 20 (12 Jul 2017 14:20) (15 - 21)  SpO2: 100% (12 Jul 2017 14:20) (95% - 100%)    Constitutional: no acute distress    ENMT: NC/AT scl anicteric opm pink no lesions     Neck: supple. No jvd or LN    Respiratory: Clear     Cardiovascular: RRR s1s2     Gastrointestinal: Pos bs , soft , not tender, no hepatosplenomegaly,  no mass; perc drain 25 cc bile colored fluid        Back: No CVA tenderness    Extremities: NO cce     Neurological: Alert and oriented x 3     Skin: No rash or jaundice    Date/Time:07-12 @ 05:02    ALT/SGPT: 107  Albumin: 1.8  AST/SGOT: 154  Bilirubin Direct: --  Bilirubin Total: 0.9  Ca: 7.9  eGFR : 113  eGFR Non-: 97  Lipase: --  Amylase: --  INR: 1.52  PTT: --        Date/Time:07-11 @ 05:46    ALT/SGPT: 110  Albumin: 2.1  AST/SGOT: 195  Bilirubin Direct: --  Bilirubin Total: 0.9  Ca: 8.3  eGFR : 110  eGFR Non-: 95  Lipase: --  Amylase: --  INR: 1.42  PTT: --        Date/Time:07-10 @ 15:43    ALT/SGPT: 102  Albumin: 1.8  AST/SGOT: 242  Bilirubin Direct: --  Bilirubin Total: 1.2  Ca: 8.4  eGFR : 108  eGFR Non-: 94  Lipase: 65  Amylase: --  INR: 1.39  PTT: --            07-12    134<L>  |  101  |  20  ----------------------------<  86  3.4<L>   |  25  |  0.72    Ca    7.9<L>      12 Jul 2017 05:02    TPro  5.5<L>  /  Alb  1.8<L>  /  TBili  0.9  /  DBili  x   /  AST  154<H>  /  ALT  107<H>  /  AlkPhos  825<H>  07-12                            10.9   13.7  )-----------( 84       ( 12 Jul 2017 05:02 )             32.2

## 2017-07-12 NOTE — CONSULT NOTE ADULT - SUBJECTIVE AND OBJECTIVE BOX
HPI: Pt is a 67y old Male with hx of NIDDM, Liver CA  (Dr Baez-oncology at [place])who presents to ED after being found  unresponsive, hypothermic and hypoglycemic. 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  7/12/17 Seen and examined at bedside. Currently C/O mid abd pain which was relieved minimally with oxycodone. Denies dyspnea     PAIN: ( X)Yes   ( )No  Level: Abd   Location: mid-right abd  Intensity:    6/10  Quality: intermittent  Aggravating Factors:  Alleviating Factors:  Radiation: towards right side  Duration/Timing:  Impact on ADLs: mod    DYSPNEA: ( ) Yes  (X ) No  Level:    PAST MEDICAL & SURGICAL HISTORY:  Insulin dependent diabetes mellitus  Liver cancer      SOCIAL HX:    States he is currently staying with his GF    Hx opiate tolerance ( )YES  ( )NO  Unknown    Baseline ADLs  (Prior to Admission)  ( X) Independent   ( )Dependent    FAMILY HISTORY:  No pertinent family history in first degree relatives      Review of Systems:    Anxiety-mild  Depression-  Physical Discomfort-mod  Dyspnea-denies  Constipation-denies  Diarrhea-  Nausea-  Vomiting-  Anorexia-mod  Weight Loss-   Cough-  Secretions-  Fatigue-mod  Weakness-mod  Delirium-    All other systems reviewed and negative  Unable to obtain/Limited due to:      PHYSICAL EXAM:    Vital Signs Last 24 Hrs  T(C): 36.4 (12 Jul 2017 05:45), Max: 37.1 (11 Jul 2017 19:15)  T(F): 97.6 (12 Jul 2017 05:45), Max: 98.7 (11 Jul 2017 19:15)  HR: 87 (12 Jul 2017 07:00) (85 - 98)  BP: 143/74 (12 Jul 2017 07:00) (114/65 - 151/81)  BP(mean): 88 (12 Jul 2017 07:00) (76 - 98)  RR: 17 (12 Jul 2017 07:00) (15 - 21)  SpO2: 96% (12 Jul 2017 07:00) (95% - 98%)  Daily     Daily     PPSV2:  40-50 %  FAST:    General: Ill appearing male in bed in NAD  Mental Status: Alert and Oriented X 3  HEENT: Oral mucosa dry  Lungs: Clear diminished renee  Cardiac: S1S2+  GI: abd softly distended + BS + mild tenderness  : Voids  Ext: KERN=strength  Neuro: No focal def      LABS:                        10.9   13.7  )-----------( 84       ( 12 Jul 2017 05:02 )             32.2     07-12    134<L>  |  101  |  20  ----------------------------<  86  3.4<L>   |  25  |  0.72    Ca    7.9<L>      12 Jul 2017 05:02    TPro  5.5<L>  /  Alb  1.8<L>  /  TBili  0.9  /  DBili  x   /  AST  154<H>  /  ALT  107<H>  /  AlkPhos  825<H>  07-12    PT/INR - ( 12 Jul 2017 05:02 )   PT: 16.6 sec;   INR: 1.52 ratio         PTT - ( 12 Jul 2017 06:16 )  PTT:53.6 sec  Albumin: Albumin, Serum: 1.8 g/dL (07-12 @ 05:02)      Allergies    No Known Allergies    Intolerances      MEDICATIONS  (STANDING):  insulin lispro (HumaLOG) corrective regimen sliding scale   SubCutaneous three times a day before meals  dextrose 50% Injectable 12.5 Gram(s) IV Push once  dextrose 50% Injectable 25 Gram(s) IV Push once  dextrose 50% Injectable 25 Gram(s) IV Push once  pantoprazole  Injectable 40 milliGRAM(s) IV Push daily  cefepime  IVPB 2000 milliGRAM(s) IV Intermittent every 12 hours  heparin  Infusion.  Unit(s)/Hr (11 mL/Hr) IV Continuous <Continuous>  heparin  Injectable 4500 Unit(s) IV Push once  dextrose 10%. 1000 milliLiter(s) (100 mL/Hr) IV Continuous <Continuous>  furosemide    Tablet 20 milliGRAM(s) Oral daily  metroNIDAZOLE  IVPB 500 milliGRAM(s) IV Intermittent every 8 hours    MEDICATIONS  (PRN):  ondansetron Injectable 4 milliGRAM(s) IV Push every 8 hours PRN Nausea  dextrose Gel 1 Dose(s) Oral once PRN Blood Glucose LESS THAN 70 milliGRAM(s)/deciliter  glucagon  Injectable 1 milliGRAM(s) IntraMuscular once PRN Glucose LESS THAN 70 milligrams/deciliter  heparin  Injectable 4500 Unit(s) IV Push every 6 hours PRN For aPTT less than 40  heparin  Injectable 2000 Unit(s) IV Push every 6 hours PRN For aPTT between 40 - 57  dextrose 50% Injectable 50 milliLiter(s) IV Push every 1 hour PRN Blood sugar less than 60  oxyCODONE    IR 10 milliGRAM(s) Oral every 4 hours PRN Moderate Pain (4 - 6)  oxyCODONE    IR 15 milliGRAM(s) Oral every 4 hours PRN Severe Pain (7 - 10)      RADIOLOGY/ADDITIONAL STUDIES:      < from: CT Abdomen and Pelvis w/ Oral Cont and w/ IV Cont (07.10.17 @ 17:47) >  EXAM:  CT ABDOMEN AND PELVIS OC IC                            PROCEDURE DATE:  07/10/2017          INTERPRETATION:  Clinical Information:Unresponsive, hypothermic, rigid   abdomen  Exam Date: 7/10/2017 5:47 PM  Technique: CT of the abdomen and pelvis was performed following the   administration of oral and IV contrast          with no prior studies   available for comparison .    FINDINGS:    Visualized lung bases: Moderate right pleural effusion with right lower   lobe compressive atelectasis.    Liver: Large hypovascular mass which appears to be emanating from the   medial right hepatic lobe and caudate lobe extending into the posterior   aspect of the left hepatic lobe measuring at least 10.0 x 9.3 cm. A   posterior inferior right hepatic hypovascular lesion measuring 3.1 x 2.5   cm. Additional smaller scattered hepatic lesions. Sclerotic features of   the liver. Therefore findings may suggest multifocal HCC or a single   large HCC with internal hepatic metastasis. Additional fillingdefects   seen extending into the suprahepatic IVC as well as distending and   enlarging the main portal vein to the level the portal confluence and   proximal splenic vein and superior mesenteric vein compatible with tumor   thrombus. Cavernous transformation of the portal vein through collaterals   is identified. A replaced right hepatic artery is visualized.   Paraesophageal varices and gastric varices.    Gallbladder: Distended gallbladder. Tiny droplets of air within the   gallbladder lumen.  Bile ducts: Pneumobilia. Common bile duct stent extending into the right   intrahepatic biliary radicles. Mild dilatation of the left intrahepatic   biliary tree.  Pancreas: within normal limits    Spleen: within normal limits  Adrenal: Nodular thickening of the bilateral adrenal glands suggesting   metastasis.    Kidneys, Ureters and Bladder:: Kidneys enhance bilaterally and   symmetrically without hydronephrosis. Subcentimeter hypodensity in the   posterior right mid to upper pole too small tocharacterize. No   intrarenal calculi. Nonspecific bilateral perinephric stranding. The   ureters and bladder are within normal limits.        Pelvis: Mild pelvic ascites. No pelvic adenopathy.    Bowel: The bowel is of normal course and caliber without evidence of   obstruction or gross bowel wall thickening. Normal appendix visualized.    Peritoneum: Mild abdominal pelvic ascites. Suggestion of nodular   thickening of the omentum which may represent peritoneal carcinomatosis.   No free air. No drainable fluid collections.    Retroperitoneum:     Subcentimeter retroperitoneal lymph nodes   non-specific in nature   Vessels: Atherosclerotic changes.        Abdominal wall: Mild anasarca. Left inguinal hernia containing ascites.  Bones: Degenerative changes. .        IMPRESSION:    Large hepatic mass involving the caudate lobe and right hepatic lobe with   tumor thrombus within the main portal vein and proximal splenic and   superior mesenteric vein with additional thrombus seen into the   suprahepatic IVC just proximal to the right atrium, with numerous   additional hepatic lesions in the setting of a cirrhotic liver suggesting   metastatic hepatocellular carcinoma.    Large paraesophageal and gastric varices.    Mild abdominal and pelvic ascites with suggestion of carcinomatosis.    Right pleural effusion.    Common bile duct stent visualized with left-sided biliary dilatation.      < end of copied text >  < from: NM Hepatobiliary Scan w/wo Gall Bladder (07.11.17 @ 16:12) >  EXAM:  NM HEPATOBILIARY IMG                            PROCEDURE DATE:  07/11/2017          INTERPRETATION:  CLINICAL INFORMATION: 67-year-old male with history of   metastatic hepatocellular carcinoma presents with right upper quadrant   abdominalpain and distended gallbladder on CT scan, evaluate for acute   cholecystitis.    RADIOPHARMACEUTICAL: 3 mCi Tc-99m-Mebrofenin, I.V.; 2 doses    TECHNIQUE:  Dynamic imaging of the anterior abdomen was performed for 2   hours after the injection of radiotracer followed by static images of the   abdomen in the anterior, right lateral and right anterior oblique   projections.  Morphine 2.4 mg I.V. and a second dose of radiotracer were   administered at 1 hour.      COMPARISON: No prior hepatobiliaryscan available. PET/CT abdomen/pelvis   7/10/2017    FINDINGS: There is heterogeneous uptake of radiotracer by the   hepatocytes. Activity is first seen in the bowel at 25 minutes. The   gallbladder is not visualized at any time during the study, despite   administration of morphine. There is good clearance of activity from the   liver at the end of the study.    IMPRESSION: Abnormal morphine-augmented hepatobiliary scan.    Findings consistent with cystic duct obstruction/acute cholecystitis.    Dr. Alvino Stover was notified of these results by telephone, on   11/11/2017, at about 4:15, with read back.          < end of copied text >

## 2017-07-12 NOTE — PROGRESS NOTE ADULT - ASSESSMENT
acute cholecystitis   Liver cancer  Cirrhosis    poor prognosis  abx per ID  IR perc drain cholecystostomy   pt should follow up with Dr. Cross with respect IR drain and his doctors including oncology

## 2017-07-12 NOTE — CONSULT NOTE ADULT - ASSESSMENT
Pt is a 67y old Male with hx of NIDDM, Liver CA  (Dr Baez-oncology at [place])who presents to ED after being found  unresponsive, hypothermic and hypoglycemic. 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  7/12/17 Seen and examined at bedside. Currently C/O mid abd pain which was relieved minimally with oxycodone. Denies dyspnea     Assessment and Plan:  1) Pain  -R/T hepatocellular Carcinoma  -R/T Acute cholecystitis   -Increase Oxycodone to 10 mg PO Q4H PRN mod pain  -Increase Oxycodone to 15 mg PO Q4H PRN severe pain  -Add Dilaudid 0.5 mg IVP Q4H PRN severe pain if unable to take PO   -Supportive care  2) Hepatocellular carcinoma.    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 acute cholecystitis  -HIDA scan noted  -ID eval noted  -hypothermia resolved  -leukocytosis improving  -f/u BC x 2  -continue abx coverage  -monitor temps  -f/u CBC  4) Malnutrition  -Poor PO intake  -Albumin=1.8  -Severe protein calorie  5) Advance directives  -Pt with capacity  -Discussed HCP Unable to name at this time  -Discussed code status unable to make any decisions  -No Limits set  -Refuses GOC discussion at this time  -Will follow

## 2017-07-12 NOTE — CDI QUERY NOTE - NSCDIOTHERTXTBX_GEN_ALL_CORE_HH
(1) Nutrition consult indicates : · Nutrient: Malnutrition; Inadequate protein-energy intake; Pt meets criteria for severe protein-calorie malnutrition  · Signs/Symptoms: Mod-Severe muscle wasting, Severe fat wasting, Significant wt loss, Self reported PO, NFPE findings  If you agree please check and sign the nutrition consult   TO BE COMPLETED BY PROVIDER:          [ ] Agree:         [ ] Disagree  Please check and sign in consult not in this query  Thanks

## 2017-07-12 NOTE — PROGRESS NOTE ADULT - ASSESSMENT
67/M admitted with     Problem/Plan - 1:  ·  Problem: Sepsis.  Plan: Sepsis sec to acute cholecystitis; s/p per cholecystostomy by IR on 7/12 and paracentesis on 7/12  Admit to med surg  cont cefepime and flagyl  #f/U blood cx ,urine cx    Problem/Plan - 2:  ·  Problem: Mesenteric vein thrombosis.  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  Discussed with IR, not a candidate for thrombolysis or thrombectomy.   d/c heparin drip as is high risk for variceal bleed and d/t thrombocytopenia. Discussed with GI, Dr. Alvino Stover.   Prognosis extremely poor.      Problem/Plan - 3:  ·  Problem: Pleural effusion.  Plan: Right pleural effusion secondary to third spacing (hypoalbuminemia)vs metastatic   #IR consult for possible thoracentesis    Problem/Plan - 4:  ·  Problem: Hepatocellular carcinoma.  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     Problem/Plan - 5:  ·  Problem: Insulin dependent diabetes mellitus + Hypoglycemia: cont D10 drip, check FS q hourly.     Problem/Plan - 6:  Problem: Essential hypertension. Plan: will avoid BP meds for now due to sepsis and hypotension.    poc discussed with pt, team, Dr. Alvino Stover.     Prognosis: Very Poor    Code: Full Code

## 2017-07-12 NOTE — CHART NOTE - NSCHARTNOTEFT_GEN_A_CORE
HPI:  67y M PMH IDDM, Liver CA last chemo per patient was 1 week ago (Dr Baez-oncology at [place]), Smoking, Prior ETOH, presents to ED BIBEMS after being found  unresponsive and was found to be hypothermic Rectal temp 90F and hypoglycemic   Fingerstick on arrival in ED 24, received 2 amps D50, subsequent fingerstick 118, repeated 10 minutes later, 118.  Denies fevers, but states he feels chills and nausea.  Rectal temp in room 90.  Denies vomiting, diarrhea,headache,SOB or cp, however family state he was found in a "whole lot of liquid" in his bed.  Pt reports feeling confused.  Reports having decreased PO intake for several days.  Denies trauma or travel.  Patient and family are unsure of what medications he takes.  His normal routine care is through [place]. Patient reports chronic abdominal pain for which he takes oxycontin at home,unsure of dose .denies any new or worsening of his chronic abdominal pain  In ED head CT- no acute pathology  Ct abd/pelvis - EKG [id number] waves in septal leads ,nonspecific ST/T waves  patient was given 2 Litres NS ,cefepime ,vanco and placed on heating blankets after which his hypothermia resolved and he is alert ,awake and oriented x3   Pmhx - HTN,liver cancer on chemo,chronic pain  Pshx- exploratory lap 10 years ago social hx- crhonic smoker,hx of etoh use ,denies recreationald rug use  Family hx - non contributory (10 Jul 2017 22:50)      PERTINENT PMH REVIEWED:  [ X ] YES [ ] NO           Primary Contact:   None listed and No HCP as per pt.     Mental Status: [ X ] Alert  [ X ] Oriented [  ] Confused [  ] Lethargic [  ]    Family Meeting attendees: meeting offered and was declined by pt.     Anticipated Grief: Patient[ X ] Family [  ]    Goals of Care: to be determined    Previous Services: [facility] for outpatient treatment     ADVANCE DIRECTIVES: Full resuscitation, No HCP    Anticipated D/C Plan: to be determined                   Summary:    Oma Sagastume NP and this SW met with pt. at bedside. Pt. discussed his current issues with pain, which team addressed. Pt. reports he was residing with his Girlfriend prior to admission. Pt. has been receiving treatment from [facility].     Team discussed with pt. HCP form. Pt. reports if he would rely on his ex brother in law to make decisions for him however, he does not want to complete HCP form today. Pt. was semi resistant to our conversation. Team encouraged this to be done and explained the importance. Team offered family meeting with either pts ex [facility] or his girlfriend, whichever he preferred. Pt. declined family meeting and reports he does not want to discuss his issues with other at this point.     Pt. had stated that when its his time its his time however, when team discussed pts wishes regarding resuscitation and intubation he states that he would want a trial period but would not want to be maintained long term on a ventilator. Pt. is currently full resuscitation.     Pt. did not wish to further the conversation as he said he would now like to go to sleep. Team informed pt. we would come back tomorrow to talk further about plan after the hospital. Emotional support provided. Our team will continue to follow.

## 2017-07-12 NOTE — PROGRESS NOTE ADULT - SUBJECTIVE AND OBJECTIVE BOX
HPI:  67y M PMH IDDM, Liver CA last chemo per patient was 1 week ago (Dr Baez-oncology at [place]), Smoking, Prior ETOH, presents to ED BIBEMS after being found  unresponsive and was found to be hypothermic Rectal temp 90F and hypoglycemic   Fingerstick on arrival in ED 24, received 2 amps D50, subsequent fingerstick 118, repeated 10 minutes later, 118.  Denies fevers, but states he feels chills and nausea.  Rectal temp in room 90.  Denies vomiting, diarrhea, headache, SOB or cp, however family state he was found in a "whole lot of liquid" in his bed.  Pt reports feeling confused.  Reports having decreased PO intake for several days.  Denies trauma or travel.  Patient and family are unsure of what medications he takes.  His normal routine care is through [place].  Patient reports chronic abdominal pain for which he takes oxycontin at home, unsure of dose . Denies any new or worsening of his chronic abdominal pain     7/11: c/o abd pain, gen  FS still running low    7/12: FS still low today      PHYSICAL EXAM:    Daily     Daily     ICU Vital Signs Last 24 Hrs  T(C): 36.6 (12 Jul 2017 15:41), Max: 37.1 (11 Jul 2017 19:15)  T(F): 97.8 (12 Jul 2017 15:41), Max: 98.7 (11 Jul 2017 19:15)  HR: 84 (12 Jul 2017 14:20) (84 - 98)  BP: 148/78 (12 Jul 2017 15:43) (114/65 - 151/81)  BP(mean): 82 (12 Jul 2017 11:00) (76 - 98)  ABP: --  ABP(mean): --  RR: 20 (12 Jul 2017 14:20) (15 - 21)  SpO2: 100% (12 Jul 2017 14:20) (95% - 100%)      Constitutional: Well appearing  HEENT: Atraumatic, FLORENCIA, Normal, No congestion  Respiratory: Breath Sounds normal, no rhonchi/wheeze  Cardiovascular: N S1S2; JOANNA present  Gastrointestinal: Abdomen soft, non tender, Bowel Sounds present  Extremities: No edema, peripheral pulses present  Neurological: AAO x 3, no gross focal motor deficits  Skin: Non cellulitic, no rash, ulcers  Lymph Nodes: No lymphadenopathy noted  Back: No CVA tenderness   Musculoskeletal: non tender  Breasts: Deferred  Genitourinary: deferred  Rectal: Deferred                          10.9   13.7  )-----------( 84       ( 12 Jul 2017 05:02 )             32.2       CBC Full  -  ( 12 Jul 2017 05:02 )  WBC Count : 13.7 K/uL  Hemoglobin : 10.9 g/dL  Hematocrit : 32.2 %  Platelet Count - Automated : 84 K/uL  Mean Cell Volume : 95.6 fl  Mean Cell Hemoglobin : 32.2 pg  Mean Cell Hemoglobin Concentration : 33.7 gm/dL  Auto Neutrophil # : x  Auto Lymphocyte # : x  Auto Monocyte # : x  Auto Eosinophil # : x  Auto Basophil # : x  Auto Neutrophil % : x  Auto Lymphocyte % : x  Auto Monocyte % : x  Auto Eosinophil % : x  Auto Basophil % : x      07-12    134<L>  |  101  |  20  ----------------------------<  86  3.4<L>   |  25  |  0.72    Ca    7.9<L>      12 Jul 2017 05:02    TPro  5.5<L>  /  Alb  1.8<L>  /  TBili  0.9  /  DBili  x   /  AST  154<H>  /  ALT  107<H>  /  AlkPhos  825<H>  07-12      LIVER FUNCTIONS - ( 12 Jul 2017 05:02 )  Alb: 1.8 g/dL / Pro: 5.5 gm/dL / ALK PHOS: 825 U/L / ALT: 107 U/L / AST: 154 U/L / GGT: x             PT/INR - ( 12 Jul 2017 05:02 )   PT: 16.6 sec;   INR: 1.52 ratio         PTT - ( 12 Jul 2017 06:16 )  PTT:53.6 sec    CARDIAC MARKERS ( 10 Jul 2017 23:29 )  0.015 ng/mL / x     / x     / x     / x      CARDIAC MARKERS ( 10 Jul 2017 19:18 )  0.022 ng/mL / x     / x     / x     / x                    MEDICATIONS  (STANDING):  insulin lispro (HumaLOG) corrective regimen sliding scale   SubCutaneous three times a day before meals  dextrose 50% Injectable 12.5 Gram(s) IV Push once  dextrose 50% Injectable 25 Gram(s) IV Push once  dextrose 50% Injectable 25 Gram(s) IV Push once  pantoprazole  Injectable 40 milliGRAM(s) IV Push daily  cefepime  IVPB 2000 milliGRAM(s) IV Intermittent every 12 hours  dextrose 10%. 1000 milliLiter(s) (100 mL/Hr) IV Continuous <Continuous>  furosemide    Tablet 20 milliGRAM(s) Oral daily  metroNIDAZOLE  IVPB 500 milliGRAM(s) IV Intermittent every 8 hours  sodium chloride 0.9%. 1000 milliLiter(s) (50 mL/Hr) IV Continuous <Continuous>

## 2017-07-12 NOTE — PROGRESS NOTE ADULT - ASSESSMENT
66 y/o Male with h/o IDDM, Liver CA s/p chemo (last cycle 1 week PTA with Dr Baez-oncology at [place]), chronic pain was admitted on 7/10 for confusion, hypoglycemia and hypothermia. He was BIBEMS after being found  unresponsive and hypothermic with rectal temp 90F. Denies fevers, but states he feels chills and nausea. He may have had diarrhea since family states he was found in a "whole lot of liquid" in his bed. Pt reports feeling confused.  Reports having decreased PO intake for several days. Patient reports chronic abdominal pain for which he takes oxycontin at home. In ER, he received vancomycin IV and cefepime.     1. Sepsis with GNR. Large liver mass with portal vein, splenic vein, superior mesenteric vein and suprahepatic vein thrombosis. Hepatocellular carcinoma. Possible septic thrombus. Possible acute cholangitis. Likely abdominal carcinomatosis. Immunocompromised host.   -hypothermia resolved  -leukocytosis improving  -f/u BC x 2  -on cefepime 2 gm IV q12h # 2  -s/p gentamicin 150 mg IV x 1 yesterday  -tolerating abx well so far; no side effects noted  -continue abx coverage  -prognosis is poor  -monitor temps  -f/u CBC  -supportive care  2. Other issues: Hypoglycemia  -care per medicine

## 2017-07-13 LAB
-  AMIKACIN: SIGNIFICANT CHANGE UP
-  AMPICILLIN/SULBACTAM: SIGNIFICANT CHANGE UP
-  AMPICILLIN: SIGNIFICANT CHANGE UP
-  AZTREONAM: SIGNIFICANT CHANGE UP
-  CEFAZOLIN: SIGNIFICANT CHANGE UP
-  CEFEPIME: SIGNIFICANT CHANGE UP
-  CEFOXITIN: SIGNIFICANT CHANGE UP
-  CEFTAZIDIME: SIGNIFICANT CHANGE UP
-  CEFTRIAXONE: SIGNIFICANT CHANGE UP
-  CIPROFLOXACIN: SIGNIFICANT CHANGE UP
-  ERTAPENEM: SIGNIFICANT CHANGE UP
-  GENTAMICIN: SIGNIFICANT CHANGE UP
-  IMIPENEM: SIGNIFICANT CHANGE UP
-  LEVOFLOXACIN: SIGNIFICANT CHANGE UP
-  MEROPENEM: SIGNIFICANT CHANGE UP
-  PIPERACILLIN/TAZOBACTAM: SIGNIFICANT CHANGE UP
-  TOBRAMYCIN: SIGNIFICANT CHANGE UP
-  TRIMETHOPRIM/SULFAMETHOXAZOLE: SIGNIFICANT CHANGE UP
ALBUMIN FLD-MCNC: 0.8 G/DL — SIGNIFICANT CHANGE UP
ANION GAP SERPL CALC-SCNC: 11 MMOL/L — SIGNIFICANT CHANGE UP (ref 5–17)
B PERT IGG+IGM PNL SER: (no result)
BUN SERPL-MCNC: 20 MG/DL — SIGNIFICANT CHANGE UP (ref 7–23)
CALCIUM SERPL-MCNC: 7.8 MG/DL — LOW (ref 8.5–10.1)
CHLORIDE SERPL-SCNC: 96 MMOL/L — SIGNIFICANT CHANGE UP (ref 96–108)
CO2 SERPL-SCNC: 23 MMOL/L — SIGNIFICANT CHANGE UP (ref 22–31)
COLOR FLD: SIGNIFICANT CHANGE UP
CREAT SERPL-MCNC: 0.75 MG/DL — SIGNIFICANT CHANGE UP (ref 0.5–1.3)
CULTURE RESULTS: SIGNIFICANT CHANGE UP
FLUID INTAKE SUBSTANCE CLASS: SIGNIFICANT CHANGE UP
FLUID SEGMENTED GRANULOCYTES: 73 % — SIGNIFICANT CHANGE UP
GLUCOSE FLD-MCNC: 97 MG/DL — SIGNIFICANT CHANGE UP
GLUCOSE SERPL-MCNC: 101 MG/DL — HIGH (ref 70–99)
HCT VFR BLD CALC: 34 % — LOW (ref 39–50)
HGB BLD-MCNC: 11.2 G/DL — LOW (ref 13–17)
INR BLD: 1.52 RATIO — HIGH (ref 0.88–1.16)
LDH SERPL L TO P-CCNC: 89 U/L — SIGNIFICANT CHANGE UP
LYMPHOCYTES # FLD: 18 % — SIGNIFICANT CHANGE UP
MCHC RBC-ENTMCNC: 31.6 PG — SIGNIFICANT CHANGE UP (ref 27–34)
MCHC RBC-ENTMCNC: 32.9 GM/DL — SIGNIFICANT CHANGE UP (ref 32–36)
MCV RBC AUTO: 96.1 FL — SIGNIFICANT CHANGE UP (ref 80–100)
METHOD TYPE: SIGNIFICANT CHANGE UP
MONOS+MACROS # FLD: 9 % — SIGNIFICANT CHANGE UP
NIGHT BLUE STAIN TISS: SIGNIFICANT CHANGE UP
ORGANISM # SPEC MICROSCOPIC CNT: SIGNIFICANT CHANGE UP
PLATELET # BLD AUTO: 94 K/UL — LOW (ref 150–400)
POTASSIUM SERPL-MCNC: 3.5 MMOL/L — SIGNIFICANT CHANGE UP (ref 3.5–5.3)
POTASSIUM SERPL-SCNC: 3.5 MMOL/L — SIGNIFICANT CHANGE UP (ref 3.5–5.3)
PROT FLD-MCNC: 1.7 G/DL — SIGNIFICANT CHANGE UP
PROTHROM AB SERPL-ACNC: 16.6 SEC — HIGH (ref 9.8–12.7)
RBC # BLD: 3.54 M/UL — LOW (ref 4.2–5.8)
RBC # FLD: 14.3 % — SIGNIFICANT CHANGE UP (ref 10.3–14.5)
RCV VOL RI: 6000 /UL — HIGH (ref 0–5)
SODIUM SERPL-SCNC: 130 MMOL/L — LOW (ref 135–145)
SPECIMEN SOURCE: SIGNIFICANT CHANGE UP
SPECIMEN SOURCE: SIGNIFICANT CHANGE UP
TOTAL NUCLEATED CELL COUNT, BODY FLUID: 903 /UL — HIGH (ref 0–5)
TUBE TYPE: SIGNIFICANT CHANGE UP
WBC # BLD: 19 K/UL — HIGH (ref 3.8–10.5)
WBC # FLD AUTO: 19 K/UL — HIGH (ref 3.8–10.5)

## 2017-07-13 PROCEDURE — 71010: CPT | Mod: 26

## 2017-07-13 PROCEDURE — 32555 ASPIRATE PLEURA W/ IMAGING: CPT | Mod: RT

## 2017-07-13 RX ORDER — TAMSULOSIN HYDROCHLORIDE 0.4 MG/1
0.4 CAPSULE ORAL AT BEDTIME
Qty: 0 | Refills: 0 | Status: DISCONTINUED | OUTPATIENT
Start: 2017-07-13 | End: 2017-07-21

## 2017-07-13 RX ORDER — OXYCODONE HYDROCHLORIDE 5 MG/1
10 TABLET ORAL EVERY 8 HOURS
Qty: 0 | Refills: 0 | Status: DISCONTINUED | OUTPATIENT
Start: 2017-07-13 | End: 2017-07-14

## 2017-07-13 RX ORDER — SODIUM CHLORIDE 9 MG/ML
1000 INJECTION INTRAMUSCULAR; INTRAVENOUS; SUBCUTANEOUS
Qty: 0 | Refills: 0 | Status: DISCONTINUED | OUTPATIENT
Start: 2017-07-13 | End: 2017-07-13

## 2017-07-13 RX ORDER — HYDROMORPHONE HYDROCHLORIDE 2 MG/ML
0.5 INJECTION INTRAMUSCULAR; INTRAVENOUS; SUBCUTANEOUS
Qty: 0 | Refills: 0 | Status: DISCONTINUED | OUTPATIENT
Start: 2017-07-13 | End: 2017-07-19

## 2017-07-13 RX ADMIN — OXYCODONE HYDROCHLORIDE 15 MILLIGRAM(S): 5 TABLET ORAL at 11:00

## 2017-07-13 RX ADMIN — SODIUM CHLORIDE 50 MILLILITER(S): 9 INJECTION INTRAMUSCULAR; INTRAVENOUS; SUBCUTANEOUS at 02:36

## 2017-07-13 RX ADMIN — OXYCODONE HYDROCHLORIDE 10 MILLIGRAM(S): 5 TABLET ORAL at 22:47

## 2017-07-13 RX ADMIN — Medication 100 MILLIGRAM(S): at 06:29

## 2017-07-13 RX ADMIN — HYDROMORPHONE HYDROCHLORIDE 0.5 MILLIGRAM(S): 2 INJECTION INTRAMUSCULAR; INTRAVENOUS; SUBCUTANEOUS at 18:00

## 2017-07-13 RX ADMIN — HYDROMORPHONE HYDROCHLORIDE 0.5 MILLIGRAM(S): 2 INJECTION INTRAMUSCULAR; INTRAVENOUS; SUBCUTANEOUS at 22:47

## 2017-07-13 RX ADMIN — Medication 20 MILLIGRAM(S): at 06:29

## 2017-07-13 RX ADMIN — Medication 100 MILLIGRAM(S): at 22:47

## 2017-07-13 RX ADMIN — CEFEPIME 100 MILLIGRAM(S): 1 INJECTION, POWDER, FOR SOLUTION INTRAMUSCULAR; INTRAVENOUS at 22:47

## 2017-07-13 RX ADMIN — HYDROMORPHONE HYDROCHLORIDE 0.5 MILLIGRAM(S): 2 INJECTION INTRAMUSCULAR; INTRAVENOUS; SUBCUTANEOUS at 18:16

## 2017-07-13 RX ADMIN — OXYCODONE HYDROCHLORIDE 10 MILLIGRAM(S): 5 TABLET ORAL at 14:22

## 2017-07-13 RX ADMIN — TAMSULOSIN HYDROCHLORIDE 0.4 MILLIGRAM(S): 0.4 CAPSULE ORAL at 22:47

## 2017-07-13 RX ADMIN — Medication 1: at 12:34

## 2017-07-13 RX ADMIN — Medication 100 MILLIGRAM(S): at 14:21

## 2017-07-13 RX ADMIN — OXYCODONE HYDROCHLORIDE 15 MILLIGRAM(S): 5 TABLET ORAL at 10:26

## 2017-07-13 RX ADMIN — PANTOPRAZOLE SODIUM 40 MILLIGRAM(S): 20 TABLET, DELAYED RELEASE ORAL at 12:33

## 2017-07-13 RX ADMIN — Medication 1: at 17:25

## 2017-07-13 RX ADMIN — CEFEPIME 100 MILLIGRAM(S): 1 INJECTION, POWDER, FOR SOLUTION INTRAMUSCULAR; INTRAVENOUS at 08:42

## 2017-07-13 RX ADMIN — HYDROMORPHONE HYDROCHLORIDE 0.5 MILLIGRAM(S): 2 INJECTION INTRAMUSCULAR; INTRAVENOUS; SUBCUTANEOUS at 13:31

## 2017-07-13 RX ADMIN — HYDROMORPHONE HYDROCHLORIDE 0.5 MILLIGRAM(S): 2 INJECTION INTRAMUSCULAR; INTRAVENOUS; SUBCUTANEOUS at 15:48

## 2017-07-13 RX ADMIN — OXYCODONE HYDROCHLORIDE 10 MILLIGRAM(S): 5 TABLET ORAL at 06:31

## 2017-07-13 NOTE — PROGRESS NOTE ADULT - ASSESSMENT
68 y/o Male with h/o IDDM, Liver CA s/p chemo (last cycle 1 week PTA with Dr Beaz-oncology at [place]), chronic pain was admitted on 7/10 for confusion, hypoglycemia and hypothermia. He was BIBEMS after being found  unresponsive and hypothermic with rectal temp 90F. Denies fevers, but states he feels chills and nausea. He may have had diarrhea since family states he was found in a "whole lot of liquid" in his bed. Pt reports feeling confused.  Reports having decreased PO intake for several days. Patient reports chronic abdominal pain for which he takes oxycontin at home. In ER, he received vancomycin IV and cefepime.     1. Sepsis with roultella planticola. Acute cholecystitis/cholangitis with GPC in pairs and chains. Large liver mass with portal vein, splenic vein, superior mesenteric vein and suprahepatic vein thrombosis. Hepatocellular carcinoma. Possible septic thrombus. Likely abdominal carcinomatosis. Immunocompromised host.   -leukocytosis worsening ?related to procedure  -f/u BC x 2  -on cefepime 2 gm IV q12h # 3  -s/p gentamicin 150 mg IV x 1 yesterday  -tolerating abx well so far; no side effects noted  -repeat BC x 2  -continue abx coverage  -prognosis is poor  -monitor temps  -f/u CBC  -supportive care  2. Other issues: Hypoglycemia  -care per medicine

## 2017-07-13 NOTE — PROGRESS NOTE ADULT - ASSESSMENT
67/M admitted with     Problem/Plan - 1:  ·  Problem: Sepsis.  Plan: Sepsis sec to acute cholecystitis; s/p per cholecystostomy by IR on 7/12 and paracentesis on 7/12  Admitted to med surg  cont cefepime and flagyl  urine cx negative; Blood cx shows Raoultella panticola sens to cefepime    Problem/Plan - 2:  ·  Problem: Mesenteric vein thrombosis.  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  Discussed with IR, not a candidate for thrombolysis or thrombectomy.   d/c heparin drip as is high risk for variceal bleed and d/t thrombocytopenia. Discussed with GI, Dr. Alvino Stover.   Prognosis extremely poor.      Problem/Plan - 3:  ·  Problem: Pleural effusion.  Plan: Right pleural effusion secondary to third spacing (hypoalbuminemia)vs metastatic   s/p Right thoracentesis on 7/13 by IR; 1 liter cloudy fluid removed    Problem/Plan - 4:  ·  Problem: Hepatocellular carcinoma.  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     Problem/Plan - 5:  ·  Problem: Insulin dependent diabetes mellitus + Hypoglycemia: cont D10 drip, check FS q hourly.     Problem/Plan - 6:  Problem: Essential hypertension. Plan: will avoid BP meds for now due to sepsis and hypotension.    Problem/Plan - 7;  Hyponatremia of 130; recheck in am    poc discussed with pt, team.    Prognosis: Very Poor    Code: Full Code

## 2017-07-13 NOTE — PROGRESS NOTE ADULT - SUBJECTIVE AND OBJECTIVE BOX
HPI: Pt is a 67y old Male with hx of NIDDM, Liver CA  (Dr Baez-oncology at [place])who presents to ED after being found  unresponsive, hypothermic and hypoglycemic. 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  7/12/17 Seen and examined at bedside. Currently C/O mid abd pain which was relieved minimally with oxycodone. Denies dyspnea   7/13/17 Seen and examined at bedside. states he feels better today. Pain persists with some relief with Oxycodone 15 mg. Dyspnea improved after thoracentesis done this am.      PAIN: ( X)Yes   ( )No  Level: Abd   Location: mid-right abd  Intensity:    6/10  Quality: intermittent  Aggravating Factors:  Alleviating Factors:  Radiation: towards right side  Duration/Timing:  Impact on ADLs: mod    DYSPNEA: ( ) Yes  (X ) No  Level:     PAST MEDICAL & SURGICAL HISTORY:  Insulin dependent diabetes mellitus  Liver cancer      SOCIAL HX:    States he is currently staying with his GF    Hx opiate tolerance ( )YES  ( )NO  Unknown    Baseline ADLs  (Prior to Admission)  ( X) Independent   ( )Dependent    FAMILY HISTORY:  No pertinent family history in first degree relatives      Review of Systems:    Anxiety-mild  Depression-  Physical Discomfort-mod  Dyspnea-denies  Constipation-denies last BM 7/11  Diarrhea-  Nausea-  Vomiting-  Anorexia-mod  Weight Loss-   Cough-  Secretions-  Fatigue-mod  Weakness-mod  Delirium-    All other systems reviewed and negative  Unable to obtain/Limited due to:      PHYSICAL EXAM:      ICU Vital Signs Last 24 Hrs  T(C): 36.4 (13 Jul 2017 05:54), Max: 36.8 (12 Jul 2017 13:36)  T(F): 97.5 (13 Jul 2017 05:54), Max: 98.3 (12 Jul 2017 23:19)  HR: 101 (13 Jul 2017 11:10) (84 - 101)  BP: 127/75 (13 Jul 2017 11:10) (112/62 - 186/83)  BP(mean): 88 (13 Jul 2017 11:10) (72 - 107)  ABP: --  ABP(mean): --  RR: 18 (13 Jul 2017 11:10) (15 - 29)  SpO2: 95% (13 Jul 2017 08:00) (95% - 100%)      PPSV2:  40-50 %  FAST:    General: Ill appearing male in bed in NAD  Mental Status: Alert and Oriented X 3  HEENT: Oral mucosa dry  Lungs: Clear diminished renee  Cardiac: S1S2+  GI: abd softly distended + BS + mild tenderness  : Voids  Ext: KERN=strength  Neuro: No focal def      LABS:                                  11.2   19.0  )-----------( 94       ( 13 Jul 2017 04:35 )             34.0          07-13    130<L>  |  96  |  20  ----------------------------<  101<H>  3.5   |  23  |  0.75    Ca    7.8<L>      13 Jul 2017 04:35    Ca    7.9<L>      12 Jul 2017 05:02    TPro  5.5<L>  /  Alb  1.8<L>  /  TBili  0.9  /  DBili  x   /  AST  154<H>  /  ALT  107<H>  /  AlkPhos  825<H>  07-12    PT/INR - ( 12 Jul 2017 05:02 )   PT: 16.6 sec;   INR: 1.52 ratio         PTT - ( 12 Jul 2017 06:16 )  PTT:53.6 sec  Albumin: Albumin, Serum: 1.8 g/dL (07-12 @ 05:02)      Allergies    No Known Allergies    Intolerances        RADIOLOGY/ADDITIONAL STUDIES:      < from: NM Hepatobiliary Scan w/wo Gall Bladder (07.11.17 @ 16:12) >  EXAM:  NM HEPATOBILIARY IMG                            PROCEDURE DATE:  07/11/2017          INTERPRETATION:  CLINICAL INFORMATION: 67-year-old male with history of   metastatic hepatocellular carcinoma presents with right upper quadrant   abdominalpain and distended gallbladder on CT scan, evaluate for acute   cholecystitis.    RADIOPHARMACEUTICAL: 3 mCi Tc-99m-Mebrofenin, I.V.; 2 doses    TECHNIQUE:  Dynamic imaging of the anterior abdomen was performed for 2   hours after the injection of radiotracer followed by static images of the   abdomen in the anterior, right lateral and right anterior oblique   projections.  Morphine 2.4 mg I.V. and a second dose of radiotracer were   administered at 1 hour.      COMPARISON: No prior hepatobiliaryscan available. PET/CT abdomen/pelvis   7/10/2017    FINDINGS: There is heterogeneous uptake of radiotracer by the   hepatocytes. Activity is first seen in the bowel at 25 minutes. The   gallbladder is not visualized at any time during the study, despite   administration of morphine. There is good clearance of activity from the   liver at the end of the study.    IMPRESSION: Abnormal morphine-augmented hepatobiliary scan.    Findings consistent with cystic duct obstruction/acute cholecystitis.    Dr. Alvino Stover was notified of these results by telephone, on   11/11/2017, at about 4:15, with read back.        < from: CT Abdomen and Pelvis w/ Oral Cont and w/ IV Cont (07.10.17 @ 17:47) >  EXAM:  CT ABDOMEN AND PELVIS OC IC                            PROCEDURE DATE:  07/10/2017          INTERPRETATION:  Clinical Information:Unresponsive, hypothermic, rigid   abdomen  Exam Date: 7/10/2017 5:47 PM  Technique: CT of the abdomen and pelvis was performed following the   administration of oral and IV contrast          with no prior studies   available for comparison .    FINDINGS:    Visualized lung bases: Moderate right pleural effusion with right lower   lobe compressive atelectasis.    Liver: Large hypovascular mass which appears to be emanating from the   medial right hepatic lobe and caudate lobe extending into the posterior   aspect of the left hepatic lobe measuring at least 10.0 x 9.3 cm. A   posterior inferior right hepatic hypovascular lesion measuring 3.1 x 2.5   cm. Additional smaller scattered hepatic lesions. Sclerotic features of   the liver. Therefore findings may suggest multifocal HCC or a single   large HCC with internal hepatic metastasis. Additional fillingdefects   seen extending into the suprahepatic IVC as well as distending and   enlarging the main portal vein to the level the portal confluence and   proximal splenic vein and superior mesenteric vein compatible with tumor   thrombus. Cavernous transformation of the portal vein through collaterals   is identified. A replaced right hepatic artery is visualized.   Paraesophageal varices and gastric varices.    Gallbladder: Distended gallbladder. Tiny droplets of air within the   gallbladder lumen.  Bile ducts: Pneumobilia. Common bile duct stent extending into the right   intrahepatic biliary radicles. Mild dilatation of the left intrahepatic   biliary tree.  Pancreas: within normal limits    Spleen: within normal limits  Adrenal: Nodular thickening of the bilateral adrenal glands suggesting   metastasis.    Kidneys, Ureters and Bladder:: Kidneys enhance bilaterally and   symmetrically without hydronephrosis. Subcentimeter hypodensity in the   posterior right mid to upper pole too small tocharacterize. No   intrarenal calculi. Nonspecific bilateral perinephric stranding. The   ureters and bladder are within normal limits.        Pelvis: Mild pelvic ascites. No pelvic adenopathy.    Bowel: The bowel is of normal course and caliber without evidence of   obstruction or gross bowel wall thickening. Normal appendix visualized.    Peritoneum: Mild abdominal pelvic ascites. Suggestion of nodular   thickening of the omentum which may represent peritoneal carcinomatosis.   No free air. No drainable fluid collections.    Retroperitoneum:     Subcentimeter retroperitoneal lymph nodes   non-specific in nature   Vessels: Atherosclerotic changes.        Abdominal wall: Mild anasarca. Left inguinal hernia containing ascites.  Bones: Degenerative changes. .        IMPRESSION:    Large hepatic mass involving the caudate lobe and right hepatic lobe with   tumor thrombus within the main portal vein and proximal splenic and   superior mesenteric vein with additional thrombus seen into the   suprahepatic IVC just proximal to the right atrium, with numerous   additional hepatic lesions in the setting of a cirrhotic liver suggesting   metastatic hepatocellular carcinoma.    Large paraesophageal and gastric varices.    Mild abdominal and pelvic ascites with suggestion of carcinomatosis.    Right pleural effusion.    Common bile duct stent visualized with left-sided biliary dilatation.      < end of copied text >  < from: NM Hepatobiliary Scan w/wo Gall Bladder (07.11.17 @ 16:12) >  EXAM:  NM HEPATOBILIARY IMG                            PROCEDURE DATE:  07/11/2017          INTERPRETATION:  CLINICAL INFORMATION: 67-year-old male with history of   metastatic hepatocellular carcinoma presents with right upper quadrant   abdominalpain and distended gallbladder on CT scan, evaluate for acute   cholecystitis.    RADIOPHARMACEUTICAL: 3 mCi Tc-99m-Mebrofenin, I.V.; 2 doses    TECHNIQUE:  Dynamic imaging of the anterior abdomen was performed for 2   hours after the injection of radiotracer followed by static images of the   abdomen in the anterior, right lateral and right anterior oblique   projections.  Morphine 2.4 mg I.V. and a second dose of radiotracer were   administered at 1 hour.      COMPARISON: No prior hepatobiliaryscan available. PET/CT abdomen/pelvis   7/10/2017    FINDINGS: There is heterogeneous uptake of radiotracer by the   hepatocytes. Activity is first seen in the bowel at 25 minutes. The   gallbladder is not visualized at any time during the study, despite   administration of morphine. There is good clearance of activity from the   liver at the end of the study.    IMPRESSION: Abnormal morphine-augmented hepatobiliary scan.    Findings consistent with cystic duct obstruction/acute cholecystitis.    Dr. Alvino Stover was notified of these results by telephone, on   11/11/2017, at about 4:15, with read back.          < end of copied text >

## 2017-07-13 NOTE — PROGRESS NOTE ADULT - SUBJECTIVE AND OBJECTIVE BOX
Patient is a 67y old  Male who presents with a chief complaint of unresponsiveness (10 Jul 2017 22:50)    HPI:  68 y/o Male with h/o IDDM, Liver CA s/p chemo (last cycle 1 week PTA with Dr Baez-oncology at [place]), chronic pain was admitted on 7/10 for confusion, hypoglycemia and hypothermia. He was BIBEMS after being found  unresponsive and hypothermic with rectal temp 90F. Denies fevers, but states he feels chills and nausea. He may have had diarrhea since family states he was found in a "whole lot of liquid" in his bed. Pt reports feeling confused.  Reports having decreased PO intake for several days. Patient reports chronic abdominal pain for which he takes oxycontin at home. In ER, he received vancomycin IV and cefepime.     s/p paracentesis and GB tube placement  Weak looking  No fever or chills  Abdomen feels distended    MEDICATIONS  (STANDING):  insulin lispro (HumaLOG) corrective regimen sliding scale   SubCutaneous three times a day before meals  dextrose 50% Injectable 12.5 Gram(s) IV Push once  dextrose 50% Injectable 25 Gram(s) IV Push once  dextrose 50% Injectable 25 Gram(s) IV Push once  pantoprazole  Injectable 40 milliGRAM(s) IV Push daily  cefepime  IVPB 2000 milliGRAM(s) IV Intermittent every 12 hours  furosemide    Tablet 20 milliGRAM(s) Oral daily  metroNIDAZOLE  IVPB 500 milliGRAM(s) IV Intermittent every 8 hours  sodium chloride 0.9%. 1000 milliLiter(s) (50 mL/Hr) IV Continuous <Continuous>    MEDICATIONS  (PRN):  ondansetron Injectable 4 milliGRAM(s) IV Push every 8 hours PRN Nausea  dextrose Gel 1 Dose(s) Oral once PRN Blood Glucose LESS THAN 70 milliGRAM(s)/deciliter  glucagon  Injectable 1 milliGRAM(s) IntraMuscular once PRN Glucose LESS THAN 70 milligrams/deciliter  dextrose 50% Injectable 50 milliLiter(s) IV Push every 1 hour PRN Blood sugar less than 60  oxyCODONE    IR 10 milliGRAM(s) Oral every 4 hours PRN Moderate Pain (4 - 6)  oxyCODONE    IR 15 milliGRAM(s) Oral every 4 hours PRN Severe Pain (7 - 10)      Vital Signs Last 24 Hrs  T(C): 36.4 (2017 05:54), Max: 36.8 (2017 13:36)  T(F): 97.5 (2017 05:54), Max: 98.3 (2017 23:19)  HR: 99 (2017 08:00) (84 - 99)  BP: 150/77 (2017 08:00) (112/62 - 186/83)  BP(mean): 94 (2017 08:00) (72 - 107)  RR: 17 (2017 08:00) (15 - 29)  SpO2: 95% (2017 08:00) (95% - 100%)    Physical Exam:    Constitutional: frail looking  HEENT: NC/AT, EOMI, PERRLA  Neck: supple  Back: no tenderness  Respiratory: clear  Cardiovascular: S1S2 regular, no murmurs  Abdomen: soft, not tender, distended, positive BS; cholecystotomy tube in place  Genitourinary: deferred  Rectal: deferred  Musculoskeletal: no muscle tenderness, no joint swelling or tenderness  Extremities: no pedal edema  Neurological: AxOx2, moving all extremities, no focal deficits  Skin: no rashes    Labs:                        11.2   19.0  )-----------( 94       ( 2017 04:35 )             34.0     07-13    130<L>  |  96  |  20  ----------------------------<  101<H>  3.5   |  23  |  0.75    Ca    7.8<L>      2017 04:35    TPro  5.5<L>  /  Alb  1.8<L>  /  TBili  0.9  /  DBili  x   /  AST  154<H>  /  ALT  107<H>  /  AlkPhos  825<H>  07-12                            10.9   13.7  )-----------( 84       ( 2017 05:02 )             32.2     07-12    134<L>  |  101  |  20  ----------------------------<  86  3.4<L>   |  25  |  0.72    Ca    7.9<L>      2017 05:02    TPro  5.5<L>  /  Alb  1.8<L>  /  TBili  0.9  /  DBili  x   /  AST  154<H>  /  ALT  107<H>  /  AlkPhos  825<H>                              10.5   16.1  )-----------( 130      ( 2017 05:46 )             30.4         135  |  101  |  25<H>  ----------------------------<  106<H>  3.7   |  27  |  0.76    Ca    8.3<L>      2017 05:46    TPro  6.3  /  Alb  2.1<L>  /  TBili  0.9  /  DBili  x   /  AST  195<H>  /  ALT  110<H>  /  AlkPhos  815<H>       LIVER FUNCTIONS - ( 2017 05:46 )  Alb: 2.1 g/dL / Pro: 6.3 gm/dL / ALK PHOS: 815 U/L / ALT: 110 U/L / AST: 195 U/L / GGT: x           Urinalysis Basic - ( 10 Jul 2017 15:43 )    Color: Yellow / Appearance: Clear / S.020 / pH: x  Gluc: x / Ketone: Negative  / Bili: Negative / Urobili: 1 mg/dL   Blood: x / Protein: 30 mg/dL / Nitrite: Negative   Leuk Esterase: Trace / RBC: 3-5 /HPF / WBC 6-10   Sq Epi: x / Non Sq Epi: x / Bacteria: Many    Culture - Blood (07.10.17 @ 15:43)    Gram Stain:   Growth in anaerobic bottle: Gram Negative Rods    Specimen Source: .Blood None    Culture Results:   Growth in anaerobic bottle: Gram Negative Rods  ***Blood Panel PCR results on this specimen are available  approximately 3 hours after the Gram stain result.***  Gram stain, PCR, and/or culture results may not always  correspond due to difference in methodologies.    Culture - Blood (07.10.17 @ 15:43)    Gram Stain:   Growth in aerobic and anaerobic bottles: Gram Negative Rods    Specimen Source: .Blood None    Culture Results:   Growth in aerobic and anaerobic bottles: Gram Negative Rods  See previous culture [id number]    Culture - Body Fluid with Gram Stain (17 @ 14:39)    Gram Stain:   No polymorphonuclear cells seen per low power field  Numerous Gram Positive Cocci in chains per oil power field    Specimen Source: .Body Fluid None    Culture - Fungal, Blood (07.10.17 @ 15:59)    Specimen Source: .Blood Blood-Peripheral    Culture Results:   Raoultella planticola isolated  No fungus isolated at 1 week.        Radiology:    < from: CT Abdomen and Pelvis w/ Oral Cont and w/ IV Cont (07.10.17 @ 17:47) >  Large hepatic mass involving the caudate lobe and right hepatic lobe with   tumor thrombus within the main portal vein and proximal splenic and   Culture - Urine (07.10.17 @ 15:43)    Specimen Source: .Urine None    Culture Results:   <10,000 CFU/ml Normal Urogenital lenin present    superior mesenteric vein with additional thrombus seen into the   suprahepatic IVC just proximal to the right atrium, with numerous   additional hepatic lesions in the setting of a cirrhotic liver suggesting   metastatic hepatocellular carcinoma.    Large paraesophageal and gastric varices.    Mild abdominal and pelvic ascites with suggestion of carcinomatosis.    Right pleural effusion.    Common bile duct stent visualized with left-sided biliary dilatation.        Advanced directives addressed: full resuscitation  Advanced care planning and DNR :   discussion and decision planning about the patient's care in case  he cannot speak for himself.   discussed with the patient; no health care proxy available at this time  30 min. face to face contact with the patient. Patient is a 67y old  Male who presents with a chief complaint of unresponsiveness (10 Jul 2017 22:50)    HPI:  68 y/o Male with h/o IDDM, Liver CA s/p chemo (last cycle 1 week PTA with Dr Baez-oncology at [place]), chronic pain was admitted on 7/10 for confusion, hypoglycemia and hypothermia. He was BIBEMS after being found  unresponsive and hypothermic with rectal temp 90F. Denies fevers, but states he feels chills and nausea. He may have had diarrhea since family states he was found in a "whole lot of liquid" in his bed. Pt reports feeling confused.  Reports having decreased PO intake for several days. Patient reports chronic abdominal pain for which he takes oxycontin at home. In ER, he received vancomycin IV and cefepime.     s/p paracentesis and GB tube placement  Weak looking  No fever or chills  Abdomen feels distended    MEDICATIONS  (STANDING):  insulin lispro (HumaLOG) corrective regimen sliding scale   SubCutaneous three times a day before meals  dextrose 50% Injectable 12.5 Gram(s) IV Push once  dextrose 50% Injectable 25 Gram(s) IV Push once  dextrose 50% Injectable 25 Gram(s) IV Push once  pantoprazole  Injectable 40 milliGRAM(s) IV Push daily  cefepime  IVPB 2000 milliGRAM(s) IV Intermittent every 12 hours  furosemide    Tablet 20 milliGRAM(s) Oral daily  metroNIDAZOLE  IVPB 500 milliGRAM(s) IV Intermittent every 8 hours  sodium chloride 0.9%. 1000 milliLiter(s) (50 mL/Hr) IV Continuous <Continuous>    MEDICATIONS  (PRN):  ondansetron Injectable 4 milliGRAM(s) IV Push every 8 hours PRN Nausea  dextrose Gel 1 Dose(s) Oral once PRN Blood Glucose LESS THAN 70 milliGRAM(s)/deciliter  glucagon  Injectable 1 milliGRAM(s) IntraMuscular once PRN Glucose LESS THAN 70 milligrams/deciliter  dextrose 50% Injectable 50 milliLiter(s) IV Push every 1 hour PRN Blood sugar less than 60  oxyCODONE    IR 10 milliGRAM(s) Oral every 4 hours PRN Moderate Pain (4 - 6)  oxyCODONE    IR 15 milliGRAM(s) Oral every 4 hours PRN Severe Pain (7 - 10)      Vital Signs Last 24 Hrs  T(C): 36.4 (2017 05:54), Max: 36.8 (2017 13:36)  T(F): 97.5 (2017 05:54), Max: 98.3 (2017 23:19)  HR: 99 (2017 08:00) (84 - 99)  BP: 150/77 (2017 08:00) (112/62 - 186/83)  BP(mean): 94 (2017 08:00) (72 - 107)  RR: 17 (2017 08:00) (15 - 29)  SpO2: 95% (2017 08:00) (95% - 100%)    Physical Exam:    Constitutional: frail looking  HEENT: NC/AT, EOMI, PERRLA  Neck: supple  Back: no tenderness  Respiratory: clear  Cardiovascular: S1S2 regular, no murmurs  Abdomen: soft, not tender, distended, positive BS; cholecystotomy tube in place  Genitourinary: deferred  Rectal: deferred  Musculoskeletal: no muscle tenderness, no joint swelling or tenderness  Extremities: no pedal edema  Neurological: AxOx2, moving all extremities, no focal deficits  Skin: no rashes    Labs:                        11.2   19.0  )-----------( 94       ( 2017 04:35 )             34.0     07-13    130<L>  |  96  |  20  ----------------------------<  101<H>  3.5   |  23  |  0.75    Ca    7.8<L>      2017 04:35    TPro  5.5<L>  /  Alb  1.8<L>  /  TBili  0.9  /  DBili  x   /  AST  154<H>  /  ALT  107<H>  /  AlkPhos  825<H>  07-12                            10.9   13.7  )-----------( 84       ( 2017 05:02 )             32.2     07-12    134<L>  |  101  |  20  ----------------------------<  86  3.4<L>   |  25  |  0.72    Ca    7.9<L>      2017 05:02    TPro  5.5<L>  /  Alb  1.8<L>  /  TBili  0.9  /  DBili  x   /  AST  154<H>  /  ALT  107<H>  /  AlkPhos  825<H>                              10.5   16.1  )-----------( 130      ( 2017 05:46 )             30.4         135  |  101  |  25<H>  ----------------------------<  106<H>  3.7   |  27  |  0.76    Ca    8.3<L>      2017 05:46    TPro  6.3  /  Alb  2.1<L>  /  TBili  0.9  /  DBili  x   /  AST  195<H>  /  ALT  110<H>  /  AlkPhos  815<H>       LIVER FUNCTIONS - ( 2017 05:46 )  Alb: 2.1 g/dL / Pro: 6.3 gm/dL / ALK PHOS: 815 U/L / ALT: 110 U/L / AST: 195 U/L / GGT: x           Urinalysis Basic - ( 10 Jul 2017 15:43 )    Color: Yellow / Appearance: Clear / S.020 / pH: x  Gluc: x / Ketone: Negative  / Bili: Negative / Urobili: 1 mg/dL   Blood: x / Protein: 30 mg/dL / Nitrite: Negative   Leuk Esterase: Trace / RBC: 3-5 /HPF / WBC 6-10   Sq Epi: x / Non Sq Epi: x / Bacteria: Many    Culture - Blood (07.10.17 @ 15:43)    Gram Stain:   Growth in anaerobic bottle: Gram Negative Rods    Specimen Source: .Blood None    Culture Results:   Growth in anaerobic bottle: Gram Negative Rods  ***Blood Panel PCR results on this specimen are available  approximately 3 hours after the Gram stain result.***  Gram stain, PCR, and/or culture results may not always  correspond due to difference in methodologies.    Culture - Blood (07.10.17 @ 15:43)    Gram Stain:   Growth in aerobic and anaerobic bottles: Gram Negative Rods    Specimen Source: .Blood None    Culture Results:   Growth in aerobic and anaerobic bottles: Gram Negative Rods  See previous culture [id number]    Culture - Body Fluid with Gram Stain (17 @ 14:39)    Gram Stain:   No polymorphonuclear cells seen per low power field  Numerous Gram Positive Cocci in chains per oil power field    Specimen Source: .Body Fluid None    Culture - Fungal, Blood (07.10.17 @ 15:59)    Specimen Source: .Blood Blood-Peripheral    Culture Results:   Raoultella planticola isolated  No fungus isolated at 1 week.        Radiology:    < from: CT Abdomen and Pelvis w/ Oral Cont and w/ IV Cont (07.10.17 @ 17:47) >  Large hepatic mass involving the caudate lobe and right hepatic lobe with   tumor thrombus within the main portal vein and proximal splenic and   Culture - Urine (07.10.17 @ 15:43)    Specimen Source: .Urine None    Culture Results:   <10,000 CFU/ml Normal Urogenital lenin present    superior mesenteric vein with additional thrombus seen into the   suprahepatic IVC just proximal to the right atrium, with numerous   additional hepatic lesions in the setting of a cirrhotic liver suggesting   metastatic hepatocellular carcinoma.    Large paraesophageal and gastric varices.    Mild abdominal and pelvic ascites with suggestion of carcinomatosis.    Right pleural effusion.    Common bile duct stent visualized with left-sided biliary dilatation.        Advanced directives addressed: full resuscitation

## 2017-07-13 NOTE — PROGRESS NOTE ADULT - ASSESSMENT
Pt is a 67y old Male with hx of NIDDM, Liver CA  (Dr Baez-oncology at [place])who presents to ED after being found  unresponsive, hypothermic and hypoglycemic. 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  7/12/17 Seen and examined at bedside. Currently C/O mid abd pain which was relieved minimally with oxycodone. Denies dyspnea     Assessment and Plan:  1) Pain  -R/T hepatocellular Carcinoma  -R/T Acute cholecystitis   -Increase Oxycodone to 10 mg PO Q4H PRN mod pain  -Increase Oxycodone to 15 mg PO Q4H PRN severe pain  -Add Dilaudid 0.5 mg IVP Q4H PRN severe pain if unable to take PO   -Add Oxycontin 10 mg PO Q8H  -Supportive care  2) Hepatocellular carcinoma.    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 acute cholecystitis  -Percutaneous drain placed to day 7/13  -HIDA scan noted  -ID eval noted  -hypothermia resolved  -leukocytosis improving  -f/u BC x 2  -continue abx coverage  -monitor temps  -f/u CBC  4) Malnutrition  -Poor PO intake  -Albumin=1.8  -Severe protein calorie  5) Advance directives  -Pt with capacity  -Discussed HCP Named Oscar Lanza   -Discussed code status unable to make any decisions  -No Limits set  -Agrees with GOC discussion with HCP  -Will call Oscar to schedule  -Will follow

## 2017-07-13 NOTE — PROGRESS NOTE ADULT - SUBJECTIVE AND OBJECTIVE BOX
HPI:  67y M PMH IDDM, Liver CA last chemo per patient was 1 week ago (Dr Baez-oncology at [place]), Smoking, Prior ETOH, presents to ED BIBEMS after being found  unresponsive and was found to be hypothermic Rectal temp 90F and hypoglycemic   Fingerstick on arrival in ED 24, received 2 amps D50, subsequent fingerstick 118, repeated 10 minutes later, 118.  Denies fevers, but states he feels chills and nausea.  Rectal temp in room 90.  Denies vomiting, diarrhea, headache, SOB or cp, however family state he was found in a "whole lot of liquid" in his bed.  Pt reports feeling confused.  Reports having decreased PO intake for several days.  Denies trauma or travel.  Patient and family are unsure of what medications he takes.  His normal routine care is through [facility].  Patient reports chronic abdominal pain for which he takes oxycontin at home, unsure of dose . Denies any new or worsening of his chronic abdominal pain     7/11: c/o abd pain, gen  FS still running low    7/12: FS still low today    7/13: Feeling better      PHYSICAL EXAM:    Daily     Daily     ICU Vital Signs Last 24 Hrs  T(C): 36.4 (13 Jul 2017 05:54), Max: 36.8 (12 Jul 2017 23:19)  T(F): 97.5 (13 Jul 2017 05:54), Max: 98.3 (12 Jul 2017 23:19)  HR: 100 (13 Jul 2017 14:00) (89 - 112)  BP: 120/71 (13 Jul 2017 14:00) (112/62 - 186/83)  BP(mean): 81 (13 Jul 2017 14:00) (72 - 107)  ABP: --  ABP(mean): --  RR: 11 (13 Jul 2017 14:00) (11 - 29)  SpO2: 95% (13 Jul 2017 08:00) (95% - 99%)      Constitutional: Well appearing  HEENT: Atraumatic, FLORENCIA, Normal, No congestion  Respiratory: Breath Sounds normal, no rhonchi/wheeze  Cardiovascular: N S1S2; JOANNA present  Gastrointestinal: Abdomen soft, non tender, Bowel Sounds present; Cholecystostomy tube present  Extremities: No edema, peripheral pulses present  Neurological: AAO x 3, no gross focal motor deficits  Skin: Non cellulitic, no rash, ulcers  Lymph Nodes: No lymphadenopathy noted  Back: No CVA tenderness   Musculoskeletal: non tender  Breasts: Deferred  Genitourinary: deferred  Rectal: Deferred                          11.2   19.0  )-----------( 94       ( 13 Jul 2017 04:35 )             34.0       CBC Full  -  ( 13 Jul 2017 04:35 )  WBC Count : 19.0 K/uL  Hemoglobin : 11.2 g/dL  Hematocrit : 34.0 %  Platelet Count - Automated : 94 K/uL  Mean Cell Volume : 96.1 fl  Mean Cell Hemoglobin : 31.6 pg  Mean Cell Hemoglobin Concentration : 32.9 gm/dL  Auto Neutrophil # : x  Auto Lymphocyte # : x  Auto Monocyte # : x  Auto Eosinophil # : x  Auto Basophil # : x  Auto Neutrophil % : x  Auto Lymphocyte % : x  Auto Monocyte % : x  Auto Eosinophil % : x  Auto Basophil % : x      07-13    130<L>  |  96  |  20  ----------------------------<  101<H>  3.5   |  23  |  0.75    Ca    7.8<L>      13 Jul 2017 04:35    TPro  5.5<L>  /  Alb  1.8<L>  /  TBili  0.9  /  DBili  x   /  AST  154<H>  /  ALT  107<H>  /  AlkPhos  825<H>  07-12      LIVER FUNCTIONS - ( 12 Jul 2017 05:02 )  Alb: 1.8 g/dL / Pro: 5.5 gm/dL / ALK PHOS: 825 U/L / ALT: 107 U/L / AST: 154 U/L / GGT: x             PT/INR - ( 13 Jul 2017 04:35 )   PT: 16.6 sec;   INR: 1.52 ratio         PTT - ( 12 Jul 2017 20:43 )  PTT:31.0 sec                  MEDICATIONS  (STANDING):  insulin lispro (HumaLOG) corrective regimen sliding scale   SubCutaneous three times a day before meals  dextrose 50% Injectable 12.5 Gram(s) IV Push once  dextrose 50% Injectable 25 Gram(s) IV Push once  dextrose 50% Injectable 25 Gram(s) IV Push once  pantoprazole  Injectable 40 milliGRAM(s) IV Push daily  cefepime  IVPB 2000 milliGRAM(s) IV Intermittent every 12 hours  furosemide    Tablet 20 milliGRAM(s) Oral daily  metroNIDAZOLE  IVPB 500 milliGRAM(s) IV Intermittent every 8 hours  oxyCODONE  ER Tablet 10 milliGRAM(s) Oral every 8 hours  tamsulosin 0.4 milliGRAM(s) Oral at bedtime

## 2017-07-14 LAB
-  AMIKACIN: SIGNIFICANT CHANGE UP
-  AMIKACIN: SIGNIFICANT CHANGE UP
-  AMPICILLIN/SULBACTAM: SIGNIFICANT CHANGE UP
-  AMPICILLIN/SULBACTAM: SIGNIFICANT CHANGE UP
-  AMPICILLIN: SIGNIFICANT CHANGE UP
-  AMPICILLIN: SIGNIFICANT CHANGE UP
-  AZTREONAM: SIGNIFICANT CHANGE UP
-  AZTREONAM: SIGNIFICANT CHANGE UP
-  CEFAZOLIN: SIGNIFICANT CHANGE UP
-  CEFAZOLIN: SIGNIFICANT CHANGE UP
-  CEFEPIME: SIGNIFICANT CHANGE UP
-  CEFEPIME: SIGNIFICANT CHANGE UP
-  CEFOXITIN: SIGNIFICANT CHANGE UP
-  CEFOXITIN: SIGNIFICANT CHANGE UP
-  CEFTAZIDIME: SIGNIFICANT CHANGE UP
-  CEFTAZIDIME: SIGNIFICANT CHANGE UP
-  CEFTRIAXONE: SIGNIFICANT CHANGE UP
-  CEFTRIAXONE: SIGNIFICANT CHANGE UP
-  CIPROFLOXACIN: SIGNIFICANT CHANGE UP
-  CIPROFLOXACIN: SIGNIFICANT CHANGE UP
-  ERTAPENEM: SIGNIFICANT CHANGE UP
-  ERTAPENEM: SIGNIFICANT CHANGE UP
-  GENTAMICIN: SIGNIFICANT CHANGE UP
-  GENTAMICIN: SIGNIFICANT CHANGE UP
-  IMIPENEM: SIGNIFICANT CHANGE UP
-  IMIPENEM: SIGNIFICANT CHANGE UP
-  LEVOFLOXACIN: SIGNIFICANT CHANGE UP
-  LEVOFLOXACIN: SIGNIFICANT CHANGE UP
-  MEROPENEM: SIGNIFICANT CHANGE UP
-  MEROPENEM: SIGNIFICANT CHANGE UP
-  PIPERACILLIN/TAZOBACTAM: SIGNIFICANT CHANGE UP
-  PIPERACILLIN/TAZOBACTAM: SIGNIFICANT CHANGE UP
-  TOBRAMYCIN: SIGNIFICANT CHANGE UP
-  TOBRAMYCIN: SIGNIFICANT CHANGE UP
-  TRIMETHOPRIM/SULFAMETHOXAZOLE: SIGNIFICANT CHANGE UP
-  TRIMETHOPRIM/SULFAMETHOXAZOLE: SIGNIFICANT CHANGE UP
ALBUMIN SERPL ELPH-MCNC: 1.9 G/DL — LOW (ref 3.3–5)
ALP SERPL-CCNC: 865 U/L — HIGH (ref 40–120)
ALT FLD-CCNC: 73 U/L — SIGNIFICANT CHANGE UP (ref 12–78)
ANION GAP SERPL CALC-SCNC: 8 MMOL/L — SIGNIFICANT CHANGE UP (ref 5–17)
AST SERPL-CCNC: 64 U/L — HIGH (ref 15–37)
BILIRUB SERPL-MCNC: 1.3 MG/DL — HIGH (ref 0.2–1.2)
BUN SERPL-MCNC: 25 MG/DL — HIGH (ref 7–23)
CALCIUM SERPL-MCNC: 8.1 MG/DL — LOW (ref 8.5–10.1)
CHLORIDE SERPL-SCNC: 99 MMOL/L — SIGNIFICANT CHANGE UP (ref 96–108)
CO2 SERPL-SCNC: 24 MMOL/L — SIGNIFICANT CHANGE UP (ref 22–31)
CREAT SERPL-MCNC: 0.84 MG/DL — SIGNIFICANT CHANGE UP (ref 0.5–1.3)
GLUCOSE SERPL-MCNC: 156 MG/DL — HIGH (ref 70–99)
HCT VFR BLD CALC: 33.6 % — LOW (ref 39–50)
HGB BLD-MCNC: 11.2 G/DL — LOW (ref 13–17)
INR BLD: 1.51 RATIO — HIGH (ref 0.88–1.16)
MCHC RBC-ENTMCNC: 32 PG — SIGNIFICANT CHANGE UP (ref 27–34)
MCHC RBC-ENTMCNC: 33.2 GM/DL — SIGNIFICANT CHANGE UP (ref 32–36)
MCV RBC AUTO: 96.2 FL — SIGNIFICANT CHANGE UP (ref 80–100)
METHOD TYPE: SIGNIFICANT CHANGE UP
METHOD TYPE: SIGNIFICANT CHANGE UP
NON-GYN CYTOLOGY SPEC: SIGNIFICANT CHANGE UP
PLATELET # BLD AUTO: 102 K/UL — LOW (ref 150–400)
POTASSIUM SERPL-MCNC: 3.5 MMOL/L — SIGNIFICANT CHANGE UP (ref 3.5–5.3)
POTASSIUM SERPL-SCNC: 3.5 MMOL/L — SIGNIFICANT CHANGE UP (ref 3.5–5.3)
PROT SERPL-MCNC: 5.6 GM/DL — LOW (ref 6–8.3)
PROTHROM AB SERPL-ACNC: 16.5 SEC — HIGH (ref 9.8–12.7)
RBC # BLD: 3.49 M/UL — LOW (ref 4.2–5.8)
RBC # FLD: 14.6 % — HIGH (ref 10.3–14.5)
SODIUM SERPL-SCNC: 131 MMOL/L — LOW (ref 135–145)
WBC # BLD: 19.2 K/UL — HIGH (ref 3.8–10.5)
WBC # FLD AUTO: 19.2 K/UL — HIGH (ref 3.8–10.5)

## 2017-07-14 RX ORDER — SENNA PLUS 8.6 MG/1
2 TABLET ORAL AT BEDTIME
Qty: 0 | Refills: 0 | Status: DISCONTINUED | OUTPATIENT
Start: 2017-07-14 | End: 2017-07-21

## 2017-07-14 RX ORDER — OXYCODONE HYDROCHLORIDE 5 MG/1
20 TABLET ORAL EVERY 8 HOURS
Qty: 0 | Refills: 0 | Status: DISCONTINUED | OUTPATIENT
Start: 2017-07-14 | End: 2017-07-15

## 2017-07-14 RX ORDER — DOCUSATE SODIUM 100 MG
100 CAPSULE ORAL THREE TIMES A DAY
Qty: 0 | Refills: 0 | Status: DISCONTINUED | OUTPATIENT
Start: 2017-07-14 | End: 2017-07-21

## 2017-07-14 RX ADMIN — HYDROMORPHONE HYDROCHLORIDE 0.5 MILLIGRAM(S): 2 INJECTION INTRAMUSCULAR; INTRAVENOUS; SUBCUTANEOUS at 06:41

## 2017-07-14 RX ADMIN — PANTOPRAZOLE SODIUM 40 MILLIGRAM(S): 20 TABLET, DELAYED RELEASE ORAL at 13:43

## 2017-07-14 RX ADMIN — Medication 20 MILLIGRAM(S): at 06:41

## 2017-07-14 RX ADMIN — HYDROMORPHONE HYDROCHLORIDE 0.5 MILLIGRAM(S): 2 INJECTION INTRAMUSCULAR; INTRAVENOUS; SUBCUTANEOUS at 07:15

## 2017-07-14 RX ADMIN — HYDROMORPHONE HYDROCHLORIDE 0.5 MILLIGRAM(S): 2 INJECTION INTRAMUSCULAR; INTRAVENOUS; SUBCUTANEOUS at 13:49

## 2017-07-14 RX ADMIN — Medication 100 MILLIGRAM(S): at 21:52

## 2017-07-14 RX ADMIN — Medication: at 10:34

## 2017-07-14 RX ADMIN — Medication 2: at 17:38

## 2017-07-14 RX ADMIN — TAMSULOSIN HYDROCHLORIDE 0.4 MILLIGRAM(S): 0.4 CAPSULE ORAL at 21:52

## 2017-07-14 RX ADMIN — Medication: at 14:26

## 2017-07-14 RX ADMIN — HYDROMORPHONE HYDROCHLORIDE 0.5 MILLIGRAM(S): 2 INJECTION INTRAMUSCULAR; INTRAVENOUS; SUBCUTANEOUS at 15:06

## 2017-07-14 RX ADMIN — OXYCODONE HYDROCHLORIDE 10 MILLIGRAM(S): 5 TABLET ORAL at 07:30

## 2017-07-14 RX ADMIN — CEFEPIME 100 MILLIGRAM(S): 1 INJECTION, POWDER, FOR SOLUTION INTRAMUSCULAR; INTRAVENOUS at 22:43

## 2017-07-14 RX ADMIN — CEFEPIME 100 MILLIGRAM(S): 1 INJECTION, POWDER, FOR SOLUTION INTRAMUSCULAR; INTRAVENOUS at 12:31

## 2017-07-14 RX ADMIN — OXYCODONE HYDROCHLORIDE 10 MILLIGRAM(S): 5 TABLET ORAL at 06:41

## 2017-07-14 RX ADMIN — HYDROMORPHONE HYDROCHLORIDE 0.5 MILLIGRAM(S): 2 INJECTION INTRAMUSCULAR; INTRAVENOUS; SUBCUTANEOUS at 20:02

## 2017-07-14 RX ADMIN — OXYCODONE HYDROCHLORIDE 10 MILLIGRAM(S): 5 TABLET ORAL at 05:57

## 2017-07-14 RX ADMIN — OXYCODONE HYDROCHLORIDE 10 MILLIGRAM(S): 5 TABLET ORAL at 15:06

## 2017-07-14 RX ADMIN — HYDROMORPHONE HYDROCHLORIDE 0.5 MILLIGRAM(S): 2 INJECTION INTRAMUSCULAR; INTRAVENOUS; SUBCUTANEOUS at 20:32

## 2017-07-14 RX ADMIN — SENNA PLUS 2 TABLET(S): 8.6 TABLET ORAL at 21:52

## 2017-07-14 RX ADMIN — OXYCODONE HYDROCHLORIDE 10 MILLIGRAM(S): 5 TABLET ORAL at 13:44

## 2017-07-14 RX ADMIN — OXYCODONE HYDROCHLORIDE 20 MILLIGRAM(S): 5 TABLET ORAL at 21:52

## 2017-07-14 RX ADMIN — OXYCODONE HYDROCHLORIDE 20 MILLIGRAM(S): 5 TABLET ORAL at 22:22

## 2017-07-14 RX ADMIN — Medication 100 MILLIGRAM(S): at 06:41

## 2017-07-14 RX ADMIN — Medication 100 MILLIGRAM(S): at 23:16

## 2017-07-14 RX ADMIN — Medication 100 MILLIGRAM(S): at 14:27

## 2017-07-14 NOTE — PROGRESS NOTE ADULT - SUBJECTIVE AND OBJECTIVE BOX
HPI: Pt is a 67y old Male with hx of NIDDM, Liver CA  (Dr Baez-oncology at [place])who presents to ED after being found  unresponsive, hypothermic and hypoglycemic. 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  7/12/17 Seen and examined at bedside. Currently C/O mid abd pain which was relieved minimally with oxycodone. Denies dyspnea   7/13/17 Seen and examined at bedside. states he feels better today. Pain persists with some relief with Oxycodone 15 mg. Dyspnea improved after thoracentesis done this am.  7/14/17 Seen and examined at bedside. OOB/chair and ambulating in room. States his pain is improved today     PAIN: ( X)Yes   ( )No  Level: Abd   Location: mid-right abd  Intensity:    4/10  Quality: intermittent  Aggravating Factors:  Alleviating Factors:  Radiation: towards right side  Duration/Timing:  Impact on ADLs: mod    DYSPNEA: ( ) Yes  (X ) No  Level:     PAST MEDICAL & SURGICAL HISTORY:  Insulin dependent diabetes mellitus  Liver cancer      SOCIAL HX:    States he is currently staying with his GF  Unable to return to that environment    Hx opiate tolerance (X )YES  ( )NO  Unknown    Baseline ADLs  (Prior to Admission)  ( X) Independent   ( )Dependent    FAMILY HISTORY:  No pertinent family history in first degree relatives      Review of Systems:    Anxiety-mild  Depression-  Physical Discomfort-mod/improved  Dyspnea-denies  Constipation-denies last BM 7/11  Diarrhea-  Nausea-  Vomiting-  Anorexia-mod  Weight Loss-   Cough-  Secretions-  Fatigue-mod  Weakness-mod  Delirium-    All other systems reviewed and negative  Unable to obtain/Limited due to:      PHYSICAL EXAM:    ICU Vital Signs Last 24 Hrs  T(C): 36.3 (14 Jul 2017 13:26), Max: 36.6 (13 Jul 2017 19:57)  T(F): 97.3 (14 Jul 2017 13:26), Max: 97.8 (13 Jul 2017 19:57)  HR: 96 (14 Jul 2017 14:01) (93 - 106)  BP: 107/66 (14 Jul 2017 14:01) (107/66 - 137/71)  BP(mean): 76 (14 Jul 2017 14:01) (76 - 86)  ABP: --  ABP(mean): --  RR: 20 (14 Jul 2017 10:00) (11 - 25)  SpO2: 94% (14 Jul 2017 14:01) (94% - 97%)      PPSV2:  40-50 %  FAST:    General: Ill appearing male in chair in NAD  Mental Status: Alert and Oriented X 3  HEENT: Oral mucosa dry  Lungs: Clear diminished renee  Cardiac: S1S2+  GI: abd softly distended + BS + mild tenderness. Danni drain intact  : Voids  Ext: KERN=strength  Neuro: No focal def      LABS:                                  11.2   19.0  )-----------( 94       ( 13 Jul 2017 04:35 )             34.0      07-14    131<L>  |  99  |  25<H>  ----------------------------<  156<H>  3.5   |  24  |  0.84    Ca    8.1<L>      14 Jul 2017 05:28    TPro  5.6<L>  /  Alb  1.9<L>  /  TBili  1.3<H>  /  DBili  x   /  AST  64<H>  /  ALT  73  /  AlkPhos  865<H>  07-14        07-13    130<L>  |  96  |  20  ----------------------------<  101<H>  3.5   |  23  |  0.75    Ca    7.8<L>      13 Jul 2017 04:35        TPro  5.5<L>  /  Alb  1.8<L>  /  TBili  0.9  /  DBili  x   /  AST  154<H>  /  ALT  107<H>  /  AlkPhos  825<H>  07-12    PT/INR - ( 12 Jul 2017 05:02 )   PT: 16.6 sec;   INR: 1.52 ratio         PTT - ( 12 Jul 2017 06:16 )  PTT:53.6 sec  Albumin: Albumin, Serum: 1.8 g/dL (07-12 @ 05:02)      Allergies    No Known Allergies    Intolerances        RADIOLOGY/ADDITIONAL STUDIES:      < from: NM Hepatobiliary Scan w/wo Gall Bladder (07.11.17 @ 16:12) >  EXAM:  NM HEPATOBILIARY IMG                            PROCEDURE DATE:  07/11/2017          INTERPRETATION:  CLINICAL INFORMATION: 67-year-old male with history of   metastatic hepatocellular carcinoma presents with right upper quadrant   abdominalpain and distended gallbladder on CT scan, evaluate for acute   cholecystitis.    RADIOPHARMACEUTICAL: 3 mCi Tc-99m-Mebrofenin, I.V.; 2 doses    TECHNIQUE:  Dynamic imaging of the anterior abdomen was performed for 2   hours after the injection of radiotracer followed by static images of the   abdomen in the anterior, right lateral and right anterior oblique   projections.  Morphine 2.4 mg I.V. and a second dose of radiotracer were   administered at 1 hour.      COMPARISON: No prior hepatobiliaryscan available. PET/CT abdomen/pelvis   7/10/2017    FINDINGS: There is heterogeneous uptake of radiotracer by the   hepatocytes. Activity is first seen in the bowel at 25 minutes. The   gallbladder is not visualized at any time during the study, despite   administration of morphine. There is good clearance of activity from the   liver at the end of the study.    IMPRESSION: Abnormal morphine-augmented hepatobiliary scan.    Findings consistent with cystic duct obstruction/acute cholecystitis.    Dr. Alvino Stover was notified of these results by telephone, on   11/11/2017, at about 4:15, with read back.        < from: CT Abdomen and Pelvis w/ Oral Cont and w/ IV Cont (07.10.17 @ 17:47) >  EXAM:  CT ABDOMEN AND PELVIS OC IC                            PROCEDURE DATE:  07/10/2017          INTERPRETATION:  Clinical Information:Unresponsive, hypothermic, rigid   abdomen  Exam Date: 7/10/2017 5:47 PM  Technique: CT of the abdomen and pelvis was performed following the   administration of oral and IV contrast          with no prior studies   available for comparison .    FINDINGS:    Visualized lung bases: Moderate right pleural effusion with right lower   lobe compressive atelectasis.    Liver: Large hypovascular mass which appears to be emanating from the   medial right hepatic lobe and caudate lobe extending into the posterior   aspect of the left hepatic lobe measuring at least 10.0 x 9.3 cm. A   posterior inferior right hepatic hypovascular lesion measuring 3.1 x 2.5   cm. Additional smaller scattered hepatic lesions. Sclerotic features of   the liver. Therefore findings may suggest multifocal HCC or a single   large HCC with internal hepatic metastasis. Additional fillingdefects   seen extending into the suprahepatic IVC as well as distending and   enlarging the main portal vein to the level the portal confluence and   proximal splenic vein and superior mesenteric vein compatible with tumor   thrombus. Cavernous transformation of the portal vein through collaterals   is identified. A replaced right hepatic artery is visualized.   Paraesophageal varices and gastric varices.    Gallbladder: Distended gallbladder. Tiny droplets of air within the   gallbladder lumen.  Bile ducts: Pneumobilia. Common bile duct stent extending into the right   intrahepatic biliary radicles. Mild dilatation of the left intrahepatic   biliary tree.  Pancreas: within normal limits    Spleen: within normal limits  Adrenal: Nodular thickening of the bilateral adrenal glands suggesting   metastasis.    Kidneys, Ureters and Bladder:: Kidneys enhance bilaterally and   symmetrically without hydronephrosis. Subcentimeter hypodensity in the   posterior right mid to upper pole too small tocharacterize. No   intrarenal calculi. Nonspecific bilateral perinephric stranding. The   ureters and bladder are within normal limits.        Pelvis: Mild pelvic ascites. No pelvic adenopathy.    Bowel: The bowel is of normal course and caliber without evidence of   obstruction or gross bowel wall thickening. Normal appendix visualized.    Peritoneum: Mild abdominal pelvic ascites. Suggestion of nodular   thickening of the omentum which may represent peritoneal carcinomatosis.   No free air. No drainable fluid collections.    Retroperitoneum:     Subcentimeter retroperitoneal lymph nodes   non-specific in nature   Vessels: Atherosclerotic changes.        Abdominal wall: Mild anasarca. Left inguinal hernia containing ascites.  Bones: Degenerative changes. .        IMPRESSION:    Large hepatic mass involving the caudate lobe and right hepatic lobe with   tumor thrombus within the main portal vein and proximal splenic and   superior mesenteric vein with additional thrombus seen into the   suprahepatic IVC just proximal to the right atrium, with numerous   additional hepatic lesions in the setting of a cirrhotic liver suggesting   metastatic hepatocellular carcinoma.    Large paraesophageal and gastric varices.    Mild abdominal and pelvic ascites with suggestion of carcinomatosis.    Right pleural effusion.    Common bile duct stent visualized with left-sided biliary dilatation.      < end of copied text >  < from: NM Hepatobiliary Scan w/wo Gall Bladder (07.11.17 @ 16:12) >  EXAM:  NM HEPATOBILIARY IMG                            PROCEDURE DATE:  07/11/2017          INTERPRETATION:  CLINICAL INFORMATION: 67-year-old male with history of   metastatic hepatocellular carcinoma presents with right upper quadrant   abdominalpain and distended gallbladder on CT scan, evaluate for acute   cholecystitis.    RADIOPHARMACEUTICAL: 3 mCi Tc-99m-Mebrofenin, I.V.; 2 doses    TECHNIQUE:  Dynamic imaging of the anterior abdomen was performed for 2   hours after the injection of radiotracer followed by static images of the   abdomen in the anterior, right lateral and right anterior oblique   projections.  Morphine 2.4 mg I.V. and a second dose of radiotracer were   administered at 1 hour.      COMPARISON: No prior hepatobiliaryscan available. PET/CT abdomen/pelvis   7/10/2017    FINDINGS: There is heterogeneous uptake of radiotracer by the   hepatocytes. Activity is first seen in the bowel at 25 minutes. The   gallbladder is not visualized at any time during the study, despite   administration of morphine. There is good clearance of activity from the   liver at the end of the study.    IMPRESSION: Abnormal morphine-augmented hepatobiliary scan.    Findings consistent with cystic duct obstruction/acute cholecystitis.    Dr. Alvino Stover was notified of these results by telephone, on   11/11/2017, at about 4:15, with read back.          < end of copied text >

## 2017-07-14 NOTE — GOALS OF CARE CONVERSATION - PERSONAL ADVANCE DIRECTIVE - CONVERSATION DETAILS
the role of Palliative medicine was discussed. The pt states that he would like to pursue treatment options with his oncologist Dr Baez at [facility] before making any decisions. We reviewed disch options including home vs inpatient hospice. He did place a DNR/DNI on chart.

## 2017-07-14 NOTE — CHART NOTE - NSCHARTNOTEFT_GEN_A_CORE
HPI:  67y M PMH IDDM, Liver CA last chemo per patient was 1 week ago (Dr Baez-oncology at [place]), Smoking, Prior ETOH, presents to ED BIBEMS after being found  unresponsive and was found to be hypothermic Rectal temp 90F and hypoglycemic     Pmhx - HTN,liver cancer on chemo,chronic pain  Pshx- exploratory lap 10 years ago social hx- crhonic smoker,hx of etoh use ,denies recreationald rug use  Family hx - non contributory (10 Jul 2017 22:50)      PERTINENT PMH REVIEWED:  [ X ] YES [ ] NO           Primary Contact:  Romel Lanza                 Relationship: Friend                         HCP/Surrogate: HCP                Phone Number: [phone], [phone]    HCP [ X ] Surrogate [   ] Guardian [   ]    Mental Status: [ X ] Alert  [ X ] Oriented [  ] Confused [  ] Lethargic [  ]    Family Meeting attendees: Pt, pts HCP Jaida Urena wife, Oma Sagastume NP, Juanito Juarez Palliative     Anticpated Grief: Patient[  ] Family [  ]    [employer]:    Spirtual Concerns:    Goals of Care: Comfort [  ] Rehabiltation [  ] Curative [  ] Life Prolonging [  ]    Previous Services:    ADVANCE DIRECTIVES:  [ ] YES [ ] NO   DNR [ ] YES [ ] NO  DNI [ ] YES [ ] NO Completed on:                     MOLST  [ ] YES [ ] NO   Completed on:  Living Will  [ ] YES [ ] NO   Completed on:    Anticipated D/C Plan:                   Summary: HPI:  67y M PMH IDDM, Liver CA last chemo per patient was 1 week ago (Dr Baez-oncology at [place]), Smoking, Prior ETOH, presents to ED BIBEMS after being found  unresponsive and was found to be hypothermic Rectal temp 90F and hypoglycemic     Pmhx - HTN,liver cancer on chemo,chronic pain  Pshx- exploratory lap 10 years ago social hx- crhonic smoker,hx of etoh use ,denies recreationald rug use  Family hx - non contributory (10 Jul 2017 22:50)      PERTINENT PMH REVIEWED:  [ X ] YES [ ] NO           Primary Contact:  Romel Myla                 Relationship: Friend                         HCP/Surrogate: HCP                Phone Number: [phone], [phone]    HCP [ X ] Surrogate [   ] Guardian [   ]    Mental Status: [ X ] Alert  [ X ] Oriented [  ] Confused [  ] Lethargic [  ]    Family Meeting attendees: Pt, pts HCP Jaida Urena wife, Oma Sagastume NP, Juanito Juarez Palliative LMSW    Anticipated Grief: Patient [ X ] Family [ X ]    Goals of Care: to be determined    Previous Services: [facility]     ADVANCE DIRECTIVES:  [ X ] YES [ ] NO   DNR [ X ] YES [ ] NO  DNI [ X ] YES [ ] NO Completed on: 7/14/2015                      Anticipated D/C Plan: to be determined after Oncology consult                   Summary:    Team met with pt. and his HCP to discuss goals of care, assist with planning and provide supportive counseling.     Pts current medical condition and goals of care discussed. Pt. had been receiving treatment from [facility] and had only one chemotherapy treatment and has planned to go back this week however, he then ended up in the hospital.    Pt. had expressed that he understands his condition is serious however, he wants to follow up with Dr. Baez- Oncologist and/or have team here speak with him to see if any treatment at this point would still be available to him. Pt. explained that Dr. Baez had explained to him he had a "50/50 chance".     Team explained we would speak with hospitalist to receive a Oncology consult. Team did speak with pt about options if no further treatments available and hospice services were discussed. Inpatient hospice discussed as pt has symptoms that are currently being managed with IV medications. LTC also discussed as pt. does not have a place to stay. Pt. was staying with his girlfriend however, she has section 8 housing and pt. can no longer stay there and has no where else to stay. Pts needs also exceed at this point likely what can be managed in a home setting.     Pt. wants to confirm if further treatment options are available prior to deciding on hospice services. DNR/DNI discussed and pts consented. Form is on chart. Pt. completed HCP assiging his friend Oscar Lanza. Pts HCP also expressed understanding of above.     Emotional support provided. Plan to be further determined. Our team will continue to follow. HPI:  67y M PMH IDDM, Liver CA last chemo per patient was 1 week ago (Dr Baez-oncology at [place]), Smoking, Prior ETOH, presents to ED BIBEMS after being found  unresponsive and was found to be hypothermic Rectal temp 90F and hypoglycemic     Pmhx - HTN,liver cancer on chemo,chronic pain  Pshx- exploratory lap 10 years ago social hx- crhonic smoker,hx of etoh use ,denies recreationald rug use  Family hx - non contributory (10 Jul 2017 22:50)      PERTINENT PMH REVIEWED:  [ X ] YES [ ] NO           Primary Contact:  Romel Myla                 Relationship: Friend                         HCP/Surrogate: HCP                Phone Number: [phone], [phone]    HCP [ X ] Surrogate [   ] Guardian [   ]    Mental Status: [ X ] Alert  [ X ] Oriented [  ] Confused [  ] Lethargic [  ]    Family Meeting attendees: Pt, pts HCP Jaida Urena wife, Oma Sagastume NP, Juanito Juarez Palliative LMSW    Anticipated Grief: Patient [ X ] Family [ X ]    Goals of Care: to be determined    Previous Services: [facility]     ADVANCE DIRECTIVES:  [ X ] YES [ ] NO   DNR [ X ] YES [ ] NO  DNI [ X ] YES [ ] NO Completed on: 7/14/2015                      Anticipated D/C Plan: to be determined after Oncology consult                   Summary:    Team met with pt. and his HCP to discuss goals of care, assist with planning and provide supportive counseling.     Pts current medical condition and goals of care discussed. Pt. had been receiving treatment from [facility] and had only one chemotherapy treatment and has planned to go back this week however, he then ended up in the hospital.    Pt. had expressed that he understands his condition is serious however, he wants to follow up with Dr. Baez- Oncologist and/or have team here speak with him to see if any treatment at this point would still be available to him. Pt. explained that Dr. Baez had explained to him he had a "50/50 chance".     Team explained we would speak with hospitalist to receive a Oncology consult. Team did speak with pt about options if no further treatments available and hospice services were discussed. Inpatient hospice discussed as pt has symptoms that are currently being managed with IV medications. LTC also discussed as pt. does not have a place to stay. Pt. has Medicare/Medicaid Pt. was staying with his girlfriend however, she has section 8 housing and pt. can no longer stay there and has no where else to stay. Pts needs also exceed at this point likely what can be managed in a home setting.     Pt. wants to confirm if further treatment options are available prior to deciding on hospice services. DNR/DNI discussed and pts consented. Form is on chart. Pt. completed HCP assigning his friend Oscar Lanza. Pts HCP also expressed understanding of above.     Emotional support provided. Plan to be further determined. Our team will continue to follow.

## 2017-07-14 NOTE — PROGRESS NOTE ADULT - ASSESSMENT
68 y/o Male with h/o IDDM, Liver CA s/p chemo (last cycle 1 week PTA with Dr Baez-oncology at [place]), chronic pain was admitted on 7/10 for confusion, hypoglycemia and hypothermia. He was BIBEMS after being found  unresponsive and hypothermic with rectal temp 90F. Denies fevers, but states he feels chills and nausea. He may have had diarrhea since family states he was found in a "whole lot of liquid" in his bed. Pt reports feeling confused.  Reports having decreased PO intake for several days. Patient reports chronic abdominal pain for which he takes oxycontin at home. In ER, he received vancomycin IV and cefepime.     1. Sepsis with roultella planticola improving. Acute cholecystitis/cholangitis with GPC in pairs and chains on GS. Large liver mass with portal vein, splenic vein, superior mesenteric vein and suprahepatic vein thrombosis. Hepatocellular carcinoma. Possible septic thrombus. Likely abdominal carcinomatosis. Immunocompromised host.   -leukocytosis is persistent  -on cefepime 2 gm IV q12h # 4  -tolerating abx well so far; no side effects noted  -f/u repeat BC x 2  -continue abx coverage  -prognosis is poor; palleative care evaluation appreciated  -monitor temps  -f/u CBC  -supportive care  2. Other issues: Hypoglycemia  -care per medicine

## 2017-07-14 NOTE — PROGRESS NOTE ADULT - ASSESSMENT
67/M admitted with     Problem/Plan - 1:  ·  Problem: Sepsis.  Plan: Sepsis with Raoultella planticola and sec to acute cholecystitis ; s/p per cholecystostomy by IR on 7/12 and paracentesis on 7/12  Admitted to med surg  cont cefepime and flagyl  urine cx negative; Blood cx shows Raoultella planticola sens to cefepime    Problem/Plan - 2:  ·  Problem: Mesenteric vein thrombosis.  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  Discussed with IR, not a candidate for thrombolysis or thrombectomy.   d/c heparin drip as is high risk for variceal bleed and d/t thrombocytopenia. Discussed with GI, Dr. Alvino Stover.   Prognosis extremely poor.      Problem/Plan - 3:  ·  Problem: Pleural effusion.  Plan: Right pleural effusion secondary to third spacing (hypoalbuminemia)vs metastatic   s/p Right thoracentesis on 7/13 by IR; 1 liter cloudy fluid removed    Problem/Plan - 4:  ·  Problem: Hepatocellular carcinoma.  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   Would get oncology consult    Problem/Plan - 5:  ·  Problem: Insulin dependent diabetes mellitus + Hypoglycemia: resolved hypoglycemia; transfer to ; FS with ISS    Problem/Plan - 6:  Problem: Essential hypertension. Plan: will avoid BP meds for now due to sepsis and hypotension. Monitor closely, BP normal with out meds.     Problem/Plan - 7;  Hyponatremia of 130; recheck in am 131. sec to 3rd spacing    poc discussed with pt, team.    Prognosis: Very Poor    Code: DNR/DNI

## 2017-07-14 NOTE — PROGRESS NOTE ADULT - SUBJECTIVE AND OBJECTIVE BOX
Patient is a 67y old  Male who presents with a chief complaint of unresponsiveness (10 Jul 2017 22:50)    HPI:  66 y/o Male with h/o IDDM, Liver CA s/p chemo (last cycle 1 week PTA with Dr Baez-oncology at [place]), chronic pain was admitted on 7/10 for confusion, hypoglycemia and hypothermia. He was BIBEMS after being found  unresponsive and hypothermic with rectal temp 90F. Denies fevers, but states he feels chills and nausea. He may have had diarrhea since family states he was found in a "whole lot of liquid" in his bed. Pt reports feeling confused.  Reports having decreased PO intake for several days. Patient reports chronic abdominal pain for which he takes oxycontin at home. In ER, he received vancomycin IV and cefepime.     Weak looking  No fever or chills  Abdomen feels distended  Has abdominal pain    MEDICATIONS  (STANDING):  insulin lispro (HumaLOG) corrective regimen sliding scale   SubCutaneous three times a day before meals  dextrose 50% Injectable 12.5 Gram(s) IV Push once  dextrose 50% Injectable 25 Gram(s) IV Push once  dextrose 50% Injectable 25 Gram(s) IV Push once  pantoprazole  Injectable 40 milliGRAM(s) IV Push daily  cefepime  IVPB 2000 milliGRAM(s) IV Intermittent every 12 hours  furosemide    Tablet 20 milliGRAM(s) Oral daily  metroNIDAZOLE  IVPB 500 milliGRAM(s) IV Intermittent every 8 hours  oxyCODONE  ER Tablet 10 milliGRAM(s) Oral every 8 hours  tamsulosin 0.4 milliGRAM(s) Oral at bedtime    MEDICATIONS  (PRN):  ondansetron Injectable 4 milliGRAM(s) IV Push every 8 hours PRN Nausea  dextrose Gel 1 Dose(s) Oral once PRN Blood Glucose LESS THAN 70 milliGRAM(s)/deciliter  glucagon  Injectable 1 milliGRAM(s) IntraMuscular once PRN Glucose LESS THAN 70 milligrams/deciliter  dextrose 50% Injectable 50 milliLiter(s) IV Push every 1 hour PRN Blood sugar less than 60  oxyCODONE    IR 10 milliGRAM(s) Oral every 4 hours PRN Moderate Pain (4 - 6)  oxyCODONE    IR 15 milliGRAM(s) Oral every 4 hours PRN Severe Pain (7 - 10)  HYDROmorphone  Injectable 0.5 milliGRAM(s) IV Push every 3 hours PRN Severe Pain (7 - 10)  bisacodyl Suppository 10 milliGRAM(s) Rectal daily PRN Constipation      Vital Signs Last 24 Hrs  T(C): 36.1 (2017 04:43), Max: 36.6 (2017 19:57)  T(F): 96.9 (2017 04:43), Max: 97.8 (2017 19:57)  HR: 95 (2017 06:00) (93 - 112)  BP: 132/68 (2017 06:00) (112/65 - 159/82)  BP(mean): 81 (2017 06:00) (77 - 97)  RR: 13 (2017 06:00) (11 - 25)  SpO2: 95% (2017 06:00) (94% - 97%)    Physical Exam:      Constitutional: frail looking  HEENT: NC/AT, EOMI, PERRLA  Neck: supple  Back: no tenderness  Respiratory: clear  Cardiovascular: S1S2 regular, no murmurs  Abdomen: soft, not tender, distended, positive BS; cholecystotomy tube in place  Genitourinary: deferred  Rectal: deferred  Musculoskeletal: no muscle tenderness, no joint swelling or tenderness  Extremities: no pedal edema  Neurological: AxOx2, moving all extremities, no focal deficits  Skin: no rashes    Labs:                        11.2   19.2  )-----------( 102      ( 2017 05:28 )             33.6     07-14    131<L>  |  99  |  25<H>  ----------------------------<  156<H>  3.5   |  24  |  0.84    Ca    8.1<L>      2017 05:28    TPro  5.6<L>  /  Alb  1.9<L>  /  TBili  1.3<H>  /  DBili  x   /  AST  64<H>  /  ALT  73  /  AlkPhos  865<H>  -                          11.2   19.0  )-----------( 94       ( 2017 04:35 )             34.0     07-13    130<L>  |  96  |  20  ----------------------------<  101<H>  3.5   |  23  |  0.75    Ca    7.8<L>      2017 04:35    TPro  5.5<L>  /  Alb  1.8<L>  /  TBili  0.9  /  DBili  x   /  AST  154<H>  /  ALT  107<H>  /  AlkPhos  825<H>                              10.9   13.7  )-----------( 84       ( 2017 05:02 )             32.2         134<L>  |  101  |  20  ----------------------------<  86  3.4<L>   |  25  |  0.72    Ca    7.9<L>      2017 05:02    TPro  5.5<L>  /  Alb  1.8<L>  /  TBili  0.9  /  DBili  x   /  AST  154<H>  /  ALT  107<H>  /  AlkPhos  825<H>                              10.5   16.1  )-----------( 130      ( 2017 05:46 )             30.4     07-11    135  |  101  |  25<H>  ----------------------------<  106<H>  3.7   |  27  |  0.76    Ca    8.3<L>      2017 05:46    TPro  6.3  /  Alb  2.1<L>  /  TBili  0.9  /  DBili  x   /  AST  195<H>  /  ALT  110<H>  /  AlkPhos  815<H>       LIVER FUNCTIONS - ( 2017 05:46 )  Alb: 2.1 g/dL / Pro: 6.3 gm/dL / ALK PHOS: 815 U/L / ALT: 110 U/L / AST: 195 U/L / GGT: x           Urinalysis Basic - ( 10 Jul 2017 15:43 )    Color: Yellow / Appearance: Clear / S.020 / pH: x  Gluc: x / Ketone: Negative  / Bili: Negative / Urobili: 1 mg/dL   Blood: x / Protein: 30 mg/dL / Nitrite: Negative   Leuk Esterase: Trace / RBC: 3-5 /HPF / WBC 6-10   Sq Epi: x / Non Sq Epi: x / Bacteria: Many    Culture - Blood (07.10.17 @ 15:43)    Gram Stain:   Growth in anaerobic bottle: Gram Negative Rods    Specimen Source: .Blood None    Culture Results:   Growth in anaerobic bottle: Gram Negative Rods  ***Blood Panel PCR results on this specimen are available  approximately 3 hours after the Gram stain result.***  Gram stain, PCR, and/or culture results may not always  correspond due to difference in methodologies.    Culture - Blood (07.10.17 @ 15:43)    Gram Stain:   Growth in aerobic and anaerobic bottles: Gram Negative Rods    Specimen Source: .Blood None    Culture Results:   Growth in aerobic and anaerobic bottles: Gram Negative Rods  See previous culture [id number]    Culture - Body Fluid with Gram Stain (17 @ 14:39)    Gram Stain:   No polymorphonuclear cells seen per low power field  Numerous Gram Positive Cocci in chains per oil power field    Specimen Source: .Body Fluid None    Culture - Fungal, Blood (07.10.17 @ 15:59)    Specimen Source: .Blood Blood-Peripheral    Culture Results:   Raoultella planticola isolated  No fungus isolated at 1 week.        Radiology:    < from: CT Abdomen and Pelvis w/ Oral Cont and w/ IV Cont (07.10.17 @ 17:47) >  Large hepatic mass involving the caudate lobe and right hepatic lobe with   tumor thrombus within the main portal vein and proximal splenic and   Culture - Urine (07.10.17 @ 15:43)    Specimen Source: .Urine None    Culture Results:   <10,000 CFU/ml Normal Urogenital lenin present    superior mesenteric vein with additional thrombus seen into the   suprahepatic IVC just proximal to the right atrium, with numerous   additional hepatic lesions in the setting of a cirrhotic liver suggesting   metastatic hepatocellular carcinoma.    Large paraesophageal and gastric varices.    Mild abdominal and pelvic ascites with suggestion of carcinomatosis.    Right pleural effusion.    Common bile duct stent visualized with left-sided biliary dilatation.        Advanced directives addressed: full resuscitation

## 2017-07-14 NOTE — PROGRESS NOTE ADULT - ASSESSMENT
Pt is a 67y old Male with hx of NIDDM, Liver CA  (Dr Baez-oncology at [place])who presents to ED after being found  unresponsive, hypothermic and hypoglycemic. 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  7/12/17 Seen and examined at bedside. Currently C/O mid abd pain which was relieved minimally with oxycodone. Denies dyspnea     Assessment and Plan:  1) Pain  -R/T hepatocellular Carcinoma  -R/T Acute cholecystitis   -S/P cholecystotomy drain placement 7/13  -Increase Oxycodone to 10 mg PO Q4H PRN mod pain  -Increase Oxycodone to 15 mg PO Q4H PRN severe pain  -Add Dilaudid 0.5 mg IVP Q4H PRN severe pain if unable to take PO   -Increase Oxycontin to 20 mg PO Q8H  -Supportive care  2) Hepatocellular carcinoma.    - hepatocellular carcinoma with peritoneal carcinomatosis with extensive mesenteric, portal vein, IVC thrombosis  -Abd CT noted  -GI eval noted  -Vascular eval noted  -Not a candidate for intervention  -Supportive care  -Follows with onc at [facility]  Received chemo 1 week ago  -Would like to follow up with [facility] Dr Baez  3) Sepsis  -R/T ? acute cholecystitis  -Percutaneous drain placed to day 7/13  -HIDA scan noted  -ID eval noted  -hypothermia resolved  -leukocytosis worsening  -f/u BC x 2  -continue abx coverage as per ID  -monitor temps  -f/u CBC  4) Malnutrition  -Poor PO intake  -Albumin=1.8  -Severe protein calorie  5) Advance directives  -Pt with capacity  -Discussed HCP Named Oscar Myla   -Agrees with GOC discussion with HCP  -Met with pat and HCP to discuss GOC  -DNR/DNI on chart  -Would like oncology eval and pursue chemo if available  -Reviewed options including hospice care if ineligible for further treatment Pt is a 67y old Male with hx of NIDDM, Liver CA  (Dr Baez-oncology at [place])who presents to ED after being found  unresponsive, hypothermic and hypoglycemic. 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  7/12/17 Seen and examined at bedside. Currently C/O mid abd pain which was relieved minimally with oxycodone. Denies dyspnea     Assessment and Plan:  1) Pain  -R/T hepatocellular Carcinoma  -R/T Acute cholecystitis   -S/P cholecystotomy drain placement 7/13  -Increase Oxycodone to 10 mg PO Q4H PRN mod pain  -Increase Oxycodone to 15 mg PO Q4H PRN severe pain  -Add Dilaudid 0.5 mg IVP Q4H PRN severe pain if unable to take PO   -Increase Oxycontin to 20 mg PO Q8H  -Supportive care  2) Hepatocellular carcinoma.    - hepatocellular carcinoma with peritoneal carcinomatosis with extensive mesenteric, portal vein, IVC thrombosis  -Abd CT noted  -GI eval noted  -Vascular eval noted  -Not a candidate for intervention  -Supportive care  -Follows with onc at [facility]  Received chemo 1 week ago  -Would like to follow up with [facility] Dr Baez  3) Sepsis  -R/T ? acute cholecystitis  -Percutaneous drain placed to day 7/13  -HIDA scan noted  -ID eval noted  -hypothermia resolved  -leukocytosis worsening  -Bld C&S pos  -f/u BC x 2  -continue abx coverage as per ID  -monitor temps  -f/u CBC  4) Malnutrition  -Poor PO intake  -Albumin=1.8  -Severe protein calorie  5) Advance directives  -Pt with capacity  -Discussed HCP Named Oscar Lanza   -Agrees with GOC discussion with HCP  -Met with pat and HCP to discuss GOC  -DNR/DNI on chart  -Would like oncology eval and pursue chemo if available  -Reviewed options including hospice care if ineligible for further treatment

## 2017-07-14 NOTE — PROGRESS NOTE ADULT - SUBJECTIVE AND OBJECTIVE BOX
· Subjective and Objective: 	  HPI:  67y M PMH IDDM, Liver CA last chemo per patient was 1 week ago (Dr Baez-oncology at [place]), Smoking, Prior ETOH, presents to ED BIBEMS after being found  unresponsive and was found to be hypothermic Rectal temp 90F and hypoglycemic   Fingerstick on arrival in ED 24, received 2 amps D50, subsequent fingerstick 118, repeated 10 minutes later, 118.  Denies fevers, but states he feels chills and nausea.  Rectal temp in room 90.  Denies vomiting, diarrhea, headache, SOB or cp, however family state he was found in a "whole lot of liquid" in his bed.  Pt reports feeling confused.  Reports having decreased PO intake for several days.  Denies trauma or travel.  Patient and family are unsure of what medications he takes.  His normal routine care is through [facility].  Patient reports chronic abdominal pain for which he takes oxycontin at home, unsure of dose . Denies any new or worsening of his chronic abdominal pain     7/11: c/o abd pain, gen  FS still running low    7/12: FS still low today    7/13: Feeling better    7/14: feels better, afebrile, some appetite back  DNR/DNI   FS better, Hypoglycemia resolved    PHYSICAL EXAM:    Daily     Daily     ICU Vital Signs Last 24 Hrs  T(C): 36.1 (14 Jul 2017 04:43), Max: 36.6 (13 Jul 2017 19:57)  T(F): 96.9 (14 Jul 2017 04:43), Max: 97.8 (13 Jul 2017 19:57)  HR: 104 (14 Jul 2017 10:00) (93 - 112)  BP: 126/72 (14 Jul 2017 10:00) (112/65 - 159/82)  BP(mean): 85 (14 Jul 2017 09:00) (77 - 97)  ABP: --  ABP(mean): --  RR: 20 (14 Jul 2017 10:00) (11 - 25)  SpO2: 96% (14 Jul 2017 07:00) (94% - 97%)      Constitutional: Well appearing  HEENT: Atraumatic, FLORENCIA, Normal, No congestion  Respiratory: Breath Sounds normal, no rhonchi/wheeze  Cardiovascular: N S1S2; JOANNA present  Gastrointestinal: Abdomen soft, non tender, Bowel Sounds present  Extremities: No edema, peripheral pulses present  Neurological: AAO x 3, no gross focal motor deficits  Skin: Non cellulitic, no rash, ulcers  Lymph Nodes: No lymphadenopathy noted  Back: No CVA tenderness   Musculoskeletal: non tender  Breasts: Deferred  Genitourinary: deferred  Rectal: Deferred                          11.2   19.2  )-----------( 102      ( 14 Jul 2017 05:28 )             33.6       CBC Full  -  ( 14 Jul 2017 05:28 )  WBC Count : 19.2 K/uL  Hemoglobin : 11.2 g/dL  Hematocrit : 33.6 %  Platelet Count - Automated : 102 K/uL  Mean Cell Volume : 96.2 fl  Mean Cell Hemoglobin : 32.0 pg  Mean Cell Hemoglobin Concentration : 33.2 gm/dL  Auto Neutrophil # : x  Auto Lymphocyte # : x  Auto Monocyte # : x  Auto Eosinophil # : x  Auto Basophil # : x  Auto Neutrophil % : x  Auto Lymphocyte % : x  Auto Monocyte % : x  Auto Eosinophil % : x  Auto Basophil % : x      07-14    131<L>  |  99  |  25<H>  ----------------------------<  156<H>  3.5   |  24  |  0.84    Ca    8.1<L>      14 Jul 2017 05:28    TPro  5.6<L>  /  Alb  1.9<L>  /  TBili  1.3<H>  /  DBili  x   /  AST  64<H>  /  ALT  73  /  AlkPhos  865<H>  07-14      LIVER FUNCTIONS - ( 14 Jul 2017 05:28 )  Alb: 1.9 g/dL / Pro: 5.6 gm/dL / ALK PHOS: 865 U/L / ALT: 73 U/L / AST: 64 U/L / GGT: x             PT/INR - ( 14 Jul 2017 05:28 )   PT: 16.5 sec;   INR: 1.51 ratio         PTT - ( 12 Jul 2017 20:43 )  PTT:31.0 sec                  MEDICATIONS  (STANDING):  insulin lispro (HumaLOG) corrective regimen sliding scale   SubCutaneous three times a day before meals  dextrose 50% Injectable 12.5 Gram(s) IV Push once  dextrose 50% Injectable 25 Gram(s) IV Push once  dextrose 50% Injectable 25 Gram(s) IV Push once  pantoprazole  Injectable 40 milliGRAM(s) IV Push daily  cefepime  IVPB 2000 milliGRAM(s) IV Intermittent every 12 hours  furosemide    Tablet 20 milliGRAM(s) Oral daily  metroNIDAZOLE  IVPB 500 milliGRAM(s) IV Intermittent every 8 hours  oxyCODONE  ER Tablet 10 milliGRAM(s) Oral every 8 hours  tamsulosin 0.4 milliGRAM(s) Oral at bedtime

## 2017-07-15 LAB
-  AMIKACIN: SIGNIFICANT CHANGE UP
-  AMPICILLIN/SULBACTAM: SIGNIFICANT CHANGE UP
-  AMPICILLIN: SIGNIFICANT CHANGE UP
-  AMPICILLIN: SIGNIFICANT CHANGE UP
-  AZTREONAM: SIGNIFICANT CHANGE UP
-  CEFAZOLIN: SIGNIFICANT CHANGE UP
-  CEFEPIME: SIGNIFICANT CHANGE UP
-  CEFOXITIN: SIGNIFICANT CHANGE UP
-  CEFTAZIDIME: SIGNIFICANT CHANGE UP
-  CEFTRIAXONE: SIGNIFICANT CHANGE UP
-  CIPROFLOXACIN: SIGNIFICANT CHANGE UP
-  CIPROFLOXACIN: SIGNIFICANT CHANGE UP
-  ERTAPENEM: SIGNIFICANT CHANGE UP
-  ERYTHROMYCIN: SIGNIFICANT CHANGE UP
-  GENTAMICIN: SIGNIFICANT CHANGE UP
-  IMIPENEM: SIGNIFICANT CHANGE UP
-  LEVOFLOXACIN: SIGNIFICANT CHANGE UP
-  MEROPENEM: SIGNIFICANT CHANGE UP
-  PIPERACILLIN/TAZOBACTAM: SIGNIFICANT CHANGE UP
-  TETRACYCLINE: SIGNIFICANT CHANGE UP
-  TOBRAMYCIN: SIGNIFICANT CHANGE UP
-  TRIMETHOPRIM/SULFAMETHOXAZOLE: SIGNIFICANT CHANGE UP
-  VANCOMYCIN: SIGNIFICANT CHANGE UP
ALBUMIN SERPL ELPH-MCNC: 1.9 G/DL — LOW (ref 3.3–5)
ALP SERPL-CCNC: 898 U/L — HIGH (ref 40–120)
ALT FLD-CCNC: 61 U/L — SIGNIFICANT CHANGE UP (ref 12–78)
ANION GAP SERPL CALC-SCNC: 8 MMOL/L — SIGNIFICANT CHANGE UP (ref 5–17)
AST SERPL-CCNC: 64 U/L — HIGH (ref 15–37)
BILIRUB SERPL-MCNC: 1.3 MG/DL — HIGH (ref 0.2–1.2)
BUN SERPL-MCNC: 27 MG/DL — HIGH (ref 7–23)
CALCIUM SERPL-MCNC: 8.2 MG/DL — LOW (ref 8.5–10.1)
CHLORIDE SERPL-SCNC: 98 MMOL/L — SIGNIFICANT CHANGE UP (ref 96–108)
CO2 SERPL-SCNC: 27 MMOL/L — SIGNIFICANT CHANGE UP (ref 22–31)
CREAT SERPL-MCNC: 1.01 MG/DL — SIGNIFICANT CHANGE UP (ref 0.5–1.3)
GLUCOSE SERPL-MCNC: 244 MG/DL — HIGH (ref 70–99)
HCT VFR BLD CALC: 34.2 % — LOW (ref 39–50)
HGB BLD-MCNC: 11.4 G/DL — LOW (ref 13–17)
INR BLD: 1.45 RATIO — HIGH (ref 0.88–1.16)
MCHC RBC-ENTMCNC: 32.1 PG — SIGNIFICANT CHANGE UP (ref 27–34)
MCHC RBC-ENTMCNC: 33.2 GM/DL — SIGNIFICANT CHANGE UP (ref 32–36)
MCV RBC AUTO: 96.6 FL — SIGNIFICANT CHANGE UP (ref 80–100)
METHOD TYPE: SIGNIFICANT CHANGE UP
METHOD TYPE: SIGNIFICANT CHANGE UP
PLATELET # BLD AUTO: 134 K/UL — LOW (ref 150–400)
POTASSIUM SERPL-MCNC: 3.8 MMOL/L — SIGNIFICANT CHANGE UP (ref 3.5–5.3)
POTASSIUM SERPL-SCNC: 3.8 MMOL/L — SIGNIFICANT CHANGE UP (ref 3.5–5.3)
PROT SERPL-MCNC: 5.7 GM/DL — LOW (ref 6–8.3)
PROTHROM AB SERPL-ACNC: 15.8 SEC — HIGH (ref 9.8–12.7)
RBC # BLD: 3.54 M/UL — LOW (ref 4.2–5.8)
RBC # FLD: 14.9 % — HIGH (ref 10.3–14.5)
SODIUM SERPL-SCNC: 133 MMOL/L — LOW (ref 135–145)
WBC # BLD: 22.8 K/UL — HIGH (ref 3.8–10.5)
WBC # FLD AUTO: 22.8 K/UL — HIGH (ref 3.8–10.5)

## 2017-07-15 RX ORDER — INSULIN GLARGINE 100 [IU]/ML
10 INJECTION, SOLUTION SUBCUTANEOUS AT BEDTIME
Qty: 0 | Refills: 0 | Status: DISCONTINUED | OUTPATIENT
Start: 2017-07-15 | End: 2017-07-16

## 2017-07-15 RX ORDER — DEXTROSE 50 % IN WATER 50 %
1 SYRINGE (ML) INTRAVENOUS ONCE
Qty: 0 | Refills: 0 | Status: DISCONTINUED | OUTPATIENT
Start: 2017-07-15 | End: 2017-07-21

## 2017-07-15 RX ORDER — INSULIN LISPRO 100/ML
VIAL (ML) SUBCUTANEOUS
Qty: 0 | Refills: 0 | Status: DISCONTINUED | OUTPATIENT
Start: 2017-07-15 | End: 2017-07-21

## 2017-07-15 RX ORDER — PANTOPRAZOLE SODIUM 20 MG/1
40 TABLET, DELAYED RELEASE ORAL DAILY
Qty: 0 | Refills: 0 | Status: DISCONTINUED | OUTPATIENT
Start: 2017-07-15 | End: 2017-07-21

## 2017-07-15 RX ORDER — LACTULOSE 10 G/15ML
10 SOLUTION ORAL THREE TIMES A DAY
Qty: 0 | Refills: 0 | Status: DISCONTINUED | OUTPATIENT
Start: 2017-07-15 | End: 2017-07-21

## 2017-07-15 RX ORDER — PIPERACILLIN AND TAZOBACTAM 4; .5 G/20ML; G/20ML
3.38 INJECTION, POWDER, LYOPHILIZED, FOR SOLUTION INTRAVENOUS EVERY 8 HOURS
Qty: 0 | Refills: 0 | Status: DISCONTINUED | OUTPATIENT
Start: 2017-07-15 | End: 2017-07-20

## 2017-07-15 RX ORDER — DEXTROSE 50 % IN WATER 50 %
12.5 SYRINGE (ML) INTRAVENOUS ONCE
Qty: 0 | Refills: 0 | Status: DISCONTINUED | OUTPATIENT
Start: 2017-07-15 | End: 2017-07-21

## 2017-07-15 RX ORDER — DEXTROSE 50 % IN WATER 50 %
25 SYRINGE (ML) INTRAVENOUS ONCE
Qty: 0 | Refills: 0 | Status: DISCONTINUED | OUTPATIENT
Start: 2017-07-15 | End: 2017-07-21

## 2017-07-15 RX ORDER — POLYETHYLENE GLYCOL 3350 17 G/17G
17 POWDER, FOR SOLUTION ORAL DAILY
Qty: 0 | Refills: 0 | Status: DISCONTINUED | OUTPATIENT
Start: 2017-07-15 | End: 2017-07-21

## 2017-07-15 RX ORDER — GLUCAGON INJECTION, SOLUTION 0.5 MG/.1ML
1 INJECTION, SOLUTION SUBCUTANEOUS ONCE
Qty: 0 | Refills: 0 | Status: DISCONTINUED | OUTPATIENT
Start: 2017-07-15 | End: 2017-07-15

## 2017-07-15 RX ORDER — INSULIN LISPRO 100/ML
VIAL (ML) SUBCUTANEOUS AT BEDTIME
Qty: 0 | Refills: 0 | Status: DISCONTINUED | OUTPATIENT
Start: 2017-07-15 | End: 2017-07-21

## 2017-07-15 RX ORDER — OXYCODONE HYDROCHLORIDE 5 MG/1
30 TABLET ORAL EVERY 8 HOURS
Qty: 0 | Refills: 0 | Status: DISCONTINUED | OUTPATIENT
Start: 2017-07-15 | End: 2017-07-21

## 2017-07-15 RX ORDER — SODIUM CHLORIDE 9 MG/ML
1000 INJECTION, SOLUTION INTRAVENOUS
Qty: 0 | Refills: 0 | Status: DISCONTINUED | OUTPATIENT
Start: 2017-07-15 | End: 2017-07-21

## 2017-07-15 RX ADMIN — Medication 20 MILLIGRAM(S): at 05:09

## 2017-07-15 RX ADMIN — Medication 100 MILLIGRAM(S): at 05:09

## 2017-07-15 RX ADMIN — LACTULOSE 10 GRAM(S): 10 SOLUTION ORAL at 22:13

## 2017-07-15 RX ADMIN — PANTOPRAZOLE SODIUM 40 MILLIGRAM(S): 20 TABLET, DELAYED RELEASE ORAL at 11:50

## 2017-07-15 RX ADMIN — Medication 100 MILLIGRAM(S): at 05:42

## 2017-07-15 RX ADMIN — OXYCODONE HYDROCHLORIDE 20 MILLIGRAM(S): 5 TABLET ORAL at 05:09

## 2017-07-15 RX ADMIN — LACTULOSE 10 GRAM(S): 10 SOLUTION ORAL at 15:13

## 2017-07-15 RX ADMIN — CEFEPIME 100 MILLIGRAM(S): 1 INJECTION, POWDER, FOR SOLUTION INTRAMUSCULAR; INTRAVENOUS at 05:09

## 2017-07-15 RX ADMIN — Medication 4: at 08:24

## 2017-07-15 RX ADMIN — TAMSULOSIN HYDROCHLORIDE 0.4 MILLIGRAM(S): 0.4 CAPSULE ORAL at 22:12

## 2017-07-15 RX ADMIN — SENNA PLUS 2 TABLET(S): 8.6 TABLET ORAL at 22:12

## 2017-07-15 RX ADMIN — HYDROMORPHONE HYDROCHLORIDE 0.5 MILLIGRAM(S): 2 INJECTION INTRAMUSCULAR; INTRAVENOUS; SUBCUTANEOUS at 20:15

## 2017-07-15 RX ADMIN — Medication 100 MILLIGRAM(S): at 14:43

## 2017-07-15 RX ADMIN — POLYETHYLENE GLYCOL 3350 17 GRAM(S): 17 POWDER, FOR SOLUTION ORAL at 14:43

## 2017-07-15 RX ADMIN — Medication 2: at 11:50

## 2017-07-15 RX ADMIN — OXYCODONE HYDROCHLORIDE 30 MILLIGRAM(S): 5 TABLET ORAL at 22:43

## 2017-07-15 RX ADMIN — PIPERACILLIN AND TAZOBACTAM 25 GRAM(S): 4; .5 INJECTION, POWDER, LYOPHILIZED, FOR SOLUTION INTRAVENOUS at 14:43

## 2017-07-15 RX ADMIN — PIPERACILLIN AND TAZOBACTAM 25 GRAM(S): 4; .5 INJECTION, POWDER, LYOPHILIZED, FOR SOLUTION INTRAVENOUS at 22:12

## 2017-07-15 RX ADMIN — Medication 100 MILLIGRAM(S): at 22:12

## 2017-07-15 RX ADMIN — OXYCODONE HYDROCHLORIDE 30 MILLIGRAM(S): 5 TABLET ORAL at 18:29

## 2017-07-15 RX ADMIN — OXYCODONE HYDROCHLORIDE 30 MILLIGRAM(S): 5 TABLET ORAL at 14:43

## 2017-07-15 RX ADMIN — Medication 4: at 18:12

## 2017-07-15 RX ADMIN — OXYCODONE HYDROCHLORIDE 20 MILLIGRAM(S): 5 TABLET ORAL at 05:39

## 2017-07-15 RX ADMIN — HYDROMORPHONE HYDROCHLORIDE 0.5 MILLIGRAM(S): 2 INJECTION INTRAMUSCULAR; INTRAVENOUS; SUBCUTANEOUS at 19:45

## 2017-07-15 RX ADMIN — OXYCODONE HYDROCHLORIDE 30 MILLIGRAM(S): 5 TABLET ORAL at 22:13

## 2017-07-15 RX ADMIN — INSULIN GLARGINE 10 UNIT(S): 100 INJECTION, SOLUTION SUBCUTANEOUS at 22:13

## 2017-07-15 NOTE — PROGRESS NOTE ADULT - SUBJECTIVE AND OBJECTIVE BOX
HPI: Pt is a 67y old Male with hx of NIDDM, Liver CA  (Dr Baez-oncology at [place])who presents to ED after being found  unresponsive, hypothermic and hypoglycemic. 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  7/12/17 Seen and examined at bedside. Currently C/O mid abd pain which was relieved minimally with oxycodone. Denies dyspnea   7/13/17 Seen and examined at bedside. states he feels better today. Pain persists with some relief with Oxycodone 15 mg. Dyspnea improved after thoracentesis done this am.  7/14/17 Seen and examined at bedside. OOB/chair and ambulating in room. States his pain is improved today   7/15/17 Seen and examined at bedside. Cont to C/O abd pain however states it is somewhat improved. Received 3 doses of Dilaudid 0.5 mg IVP over the past 24 hrs in addition to Oxycontin 20 mg PO Q8H.     PAIN: ( X)Yes   ( )No  Level: Abd   Location: mid-right abd  Intensity:    4/10  Quality: intermittent  Aggravating Factors:  Alleviating Factors:  Radiation: towards right side  Duration/Timing:  Impact on ADLs: mod    DYSPNEA: ( ) Yes  (X ) No  Level:     PAST MEDICAL & SURGICAL HISTORY:  Insulin dependent diabetes mellitus  Liver cancer      SOCIAL HX:    States he is currently staying with his GF  Unable to return to that environment    Hx opiate tolerance (X )YES  ( )NO  Unknown    Baseline ADLs  (Prior to Admission)  ( X) Independent   ( )Dependent    FAMILY HISTORY:  No pertinent family history in first degree relatives      Review of Systems:    Anxiety-mild  Depression-  Physical Discomfort-mod/improved  Dyspnea-denies  Constipation-yes last BM 7/11  Diarrhea-  Nausea-  Vomiting-  Anorexia-mod  Weight Loss-   Cough-  Secretions-  Fatigue-mod  Weakness-mod  Delirium-    All other systems reviewed and negative  Unable to obtain/Limited due to:      PHYSICAL EXAM:      ICU Vital Signs Last 24 Hrs  T(C): 37.1 (15 Jul 2017 04:59), Max: 37.1 (15 Jul 2017 04:59)  T(F): 98.7 (15 Jul 2017 04:59), Max: 98.7 (15 Jul 2017 04:59)  HR: 70 (15 Jul 2017 04:59) (70 - 104)  BP: 118/57 (15 Jul 2017 04:59) (107/66 - 133/63)  BP(mean): 74 (14 Jul 2017 16:00) (68 - 76)  ABP: --  ABP(mean): --  RR: 16 (15 Jul 2017 04:59) (16 - 20)  SpO2: 97% (15 Jul 2017 04:59) (94% - 98%)      PPSV2:  40-50 %  FAST:    General: Ill appearing male in bed in NAD  Mental Status: Alert and Oriented X 3  HEENT: Oral mucosa dry  Lungs: Clear diminished renee  Cardiac: S1S2+  GI: abd softly distended + BS + mild tenderness. Danni drain intact  : Voids  Ext: KERN=strength  Neuro: No focal def      LABS:                              11.4   22.8  )-----------( 134      ( 15 Jul 2017 05:44 )             34.2            07-15    133<L>  |  98  |  27<H>  ----------------------------<  244<H>  3.8   |  27  |  1.01    Ca    8.2<L>      15 Jul 2017 05:44    TPro  5.7<L>  /  Alb  1.9<L>  /  TBili  1.3<H>  /  DBili  x   /  AST  64<H>  /  ALT  61  /  AlkPhos  898<H>  07-15      Allergies    No Known Allergies    Intolerances        RADIOLOGY/ADDITIONAL STUDIES:      < from: NM Hepatobiliary Scan w/wo Gall Bladder (07.11.17 @ 16:12) >  EXAM:  NM HEPATOBILIARY IMG                            PROCEDURE DATE:  07/11/2017          INTERPRETATION:  CLINICAL INFORMATION: 67-year-old male with history of   metastatic hepatocellular carcinoma presents with right upper quadrant   abdominalpain and distended gallbladder on CT scan, evaluate for acute   cholecystitis.    RADIOPHARMACEUTICAL: 3 mCi Tc-99m-Mebrofenin, I.V.; 2 doses    TECHNIQUE:  Dynamic imaging of the anterior abdomen was performed for 2   hours after the injection of radiotracer followed by static images of the   abdomen in the anterior, right lateral and right anterior oblique   projections.  Morphine 2.4 mg I.V. and a second dose of radiotracer were   administered at 1 hour.      COMPARISON: No prior hepatobiliaryscan available. PET/CT abdomen/pelvis   7/10/2017    FINDINGS: There is heterogeneous uptake of radiotracer by the   hepatocytes. Activity is first seen in the bowel at 25 minutes. The   gallbladder is not visualized at any time during the study, despite   administration of morphine. There is good clearance of activity from the   liver at the end of the study.    IMPRESSION: Abnormal morphine-augmented hepatobiliary scan.    Findings consistent with cystic duct obstruction/acute cholecystitis.    Dr. Alvino Stover was notified of these results by telephone, on   11/11/2017, at about 4:15, with read back.        < from: CT Abdomen and Pelvis w/ Oral Cont and w/ IV Cont (07.10.17 @ 17:47) >  EXAM:  CT ABDOMEN AND PELVIS OC IC                            PROCEDURE DATE:  07/10/2017          INTERPRETATION:  Clinical Information:Unresponsive, hypothermic, rigid   abdomen  Exam Date: 7/10/2017 5:47 PM  Technique: CT of the abdomen and pelvis was performed following the   administration of oral and IV contrast          with no prior studies   available for comparison .    FINDINGS:    Visualized lung bases: Moderate right pleural effusion with right lower   lobe compressive atelectasis.    Liver: Large hypovascular mass which appears to be emanating from the   medial right hepatic lobe and caudate lobe extending into the posterior   aspect of the left hepatic lobe measuring at least 10.0 x 9.3 cm. A   posterior inferior right hepatic hypovascular lesion measuring 3.1 x 2.5   cm. Additional smaller scattered hepatic lesions. Sclerotic features of   the liver. Therefore findings may suggest multifocal HCC or a single   large HCC with internal hepatic metastasis. Additional fillingdefects   seen extending into the suprahepatic IVC as well as distending and   enlarging the main portal vein to the level the portal confluence and   proximal splenic vein and superior mesenteric vein compatible with tumor   thrombus. Cavernous transformation of the portal vein through collaterals   is identified. A replaced right hepatic artery is visualized.   Paraesophageal varices and gastric varices.    Gallbladder: Distended gallbladder. Tiny droplets of air within the   gallbladder lumen.  Bile ducts: Pneumobilia. Common bile duct stent extending into the right   intrahepatic biliary radicles. Mild dilatation of the left intrahepatic   biliary tree.  Pancreas: within normal limits    Spleen: within normal limits  Adrenal: Nodular thickening of the bilateral adrenal glands suggesting   metastasis.    Kidneys, Ureters and Bladder:: Kidneys enhance bilaterally and   symmetrically without hydronephrosis. Subcentimeter hypodensity in the   posterior right mid to upper pole too small tocharacterize. No   intrarenal calculi. Nonspecific bilateral perinephric stranding. The   ureters and bladder are within normal limits.        Pelvis: Mild pelvic ascites. No pelvic adenopathy.    Bowel: The bowel is of normal course and caliber without evidence of   obstruction or gross bowel wall thickening. Normal appendix visualized.    Peritoneum: Mild abdominal pelvic ascites. Suggestion of nodular   thickening of the omentum which may represent peritoneal carcinomatosis.   No free air. No drainable fluid collections.    Retroperitoneum:     Subcentimeter retroperitoneal lymph nodes   non-specific in nature   Vessels: Atherosclerotic changes.        Abdominal wall: Mild anasarca. Left inguinal hernia containing ascites.  Bones: Degenerative changes. .        IMPRESSION:    Large hepatic mass involving the caudate lobe and right hepatic lobe with   tumor thrombus within the main portal vein and proximal splenic and   superior mesenteric vein with additional thrombus seen into the   suprahepatic IVC just proximal to the right atrium, with numerous   additional hepatic lesions in the setting of a cirrhotic liver suggesting   metastatic hepatocellular carcinoma.    Large paraesophageal and gastric varices.    Mild abdominal and pelvic ascites with suggestion of carcinomatosis.    Right pleural effusion.    Common bile duct stent visualized with left-sided biliary dilatation.      < end of copied text >  < from: NM Hepatobiliary Scan w/wo Gall Bladder (07.11.17 @ 16:12) >  EXAM:  NM HEPATOBILIARY IMG                            PROCEDURE DATE:  07/11/2017          INTERPRETATION:  CLINICAL INFORMATION: 67-year-old male with history of   metastatic hepatocellular carcinoma presents with right upper quadrant   abdominalpain and distended gallbladder on CT scan, evaluate for acute   cholecystitis.    RADIOPHARMACEUTICAL: 3 mCi Tc-99m-Mebrofenin, I.V.; 2 doses    TECHNIQUE:  Dynamic imaging of the anterior abdomen was performed for 2   hours after the injection of radiotracer followed by static images of the   abdomen in the anterior, right lateral and right anterior oblique   projections.  Morphine 2.4 mg I.V. and a second dose of radiotracer were   administered at 1 hour.      COMPARISON: No prior hepatobiliaryscan available. PET/CT abdomen/pelvis   7/10/2017    FINDINGS: There is heterogeneous uptake of radiotracer by the   hepatocytes. Activity is first seen in the bowel at 25 minutes. The   gallbladder is not visualized at any time during the study, despite   administration of morphine. There is good clearance of activity from the   liver at the end of the study.    IMPRESSION: Abnormal morphine-augmented hepatobiliary scan.    Findings consistent with cystic duct obstruction/acute cholecystitis.    Dr. Alvino Stover was notified of these results by telephone, on   11/11/2017, at about 4:15, with read back.          < end of copied text >

## 2017-07-15 NOTE — PROGRESS NOTE ADULT - SUBJECTIVE AND OBJECTIVE BOX
Patient is a 67y old  Male who presents with a chief complaint of unresponsiveness (10 Jul 2017 22:50)    HPI:  66 y/o Male with h/o IDDM, Liver CA s/p chemo (last cycle 1 week PTA with Dr Baez-oncology at [place]), chronic pain was admitted on 7/10 for confusion, hypoglycemia and hypothermia. He was BIBEMS after being found  unresponsive and hypothermic with rectal temp 90F. Denies fevers, but states he feels chills and nausea. He may have had diarrhea since family states he was found in a "whole lot of liquid" in his bed. Pt reports feeling confused.  Reports having decreased PO intake for several days. Patient reports chronic abdominal pain for which he takes oxycontin at home. In ER, he received vancomycin IV and cefepime.     Weak looking  No fever or chills  Abdomen feels distended  Has abdominal pain at drain site  No diarrhea    MEDICATIONS  (STANDING):  insulin lispro (HumaLOG) corrective regimen sliding scale   SubCutaneous three times a day before meals  dextrose 50% Injectable 12.5 Gram(s) IV Push once  dextrose 50% Injectable 25 Gram(s) IV Push once  dextrose 50% Injectable 25 Gram(s) IV Push once  pantoprazole  Injectable 40 milliGRAM(s) IV Push daily  cefepime  IVPB 2000 milliGRAM(s) IV Intermittent every 12 hours  furosemide    Tablet 20 milliGRAM(s) Oral daily  metroNIDAZOLE  IVPB 500 milliGRAM(s) IV Intermittent every 8 hours  tamsulosin 0.4 milliGRAM(s) Oral at bedtime  oxyCODONE  ER Tablet 20 milliGRAM(s) Oral every 8 hours  docusate sodium 100 milliGRAM(s) Oral three times a day    MEDICATIONS  (PRN):  ondansetron Injectable 4 milliGRAM(s) IV Push every 8 hours PRN Nausea  dextrose Gel 1 Dose(s) Oral once PRN Blood Glucose LESS THAN 70 milliGRAM(s)/deciliter  glucagon  Injectable 1 milliGRAM(s) IntraMuscular once PRN Glucose LESS THAN 70 milligrams/deciliter  dextrose 50% Injectable 50 milliLiter(s) IV Push every 1 hour PRN Blood sugar less than 60  oxyCODONE    IR 10 milliGRAM(s) Oral every 4 hours PRN Moderate Pain (4 - 6)  oxyCODONE    IR 15 milliGRAM(s) Oral every 4 hours PRN Severe Pain (7 - 10)  HYDROmorphone  Injectable 0.5 milliGRAM(s) IV Push every 3 hours PRN Severe Pain (7 - 10)  bisacodyl Suppository 10 milliGRAM(s) Rectal daily PRN Constipation  senna 2 Tablet(s) Oral at bedtime PRN Constipation      Vital Signs Last 24 Hrs  T(C): 37.1 (15 Jul 2017 04:59), Max: 37.1 (15 Jul 2017 04:59)  T(F): 98.7 (15 Jul 2017 04:59), Max: 98.7 (15 Jul 2017 04:59)  HR: 70 (15 Jul 2017 04:59) (70 - 104)  BP: 118/57 (15 Jul 2017 04:59) (107/66 - 133/63)  BP(mean): 74 (2017 16:00) (68 - 85)  RR: 16 (15 Jul 2017 04:59) (16 - 20)  SpO2: 97% (15 Jul 2017 04:59) (94% - 98%)    Physical Exam:        Constitutional: frail looking  HEENT: NC/AT, EOMI, PERRLA  Neck: supple  Back: no tenderness  Respiratory: clear  Cardiovascular: S1S2 regular, no murmurs  Abdomen: soft, not tender, distended, positive BS; cholecystotomy tube in place  Genitourinary: deferred  Rectal: deferred  Musculoskeletal: no muscle tenderness, no joint swelling or tenderness  Extremities: no pedal edema  Neurological: AxOx3, moving all extremities, no focal deficits  Skin: no rashes    Labs:                        11.4   22.8  )-----------( 134      ( 15 Jul 2017 05:44 )             34.2     07-15    133<L>  |  98  |  27<H>  ----------------------------<  244<H>  3.8   |  27  |  1.01    Ca    8.2<L>      15 Jul 2017 05:44    TPro  5.7<L>  /  Alb  1.9<L>  /  TBili  1.3<H>  /  DBili  x   /  AST  64<H>  /  ALT  61  /  AlkPhos  898<H>  07-15           Cultures:                         11.2   19.2  )-----------( 102      ( 2017 05:28 )             33.6     07-14    131<L>  |  99  |  25<H>  ----------------------------<  156<H>  3.5   |  24  |  0.84    Ca    8.1<L>      2017 05:28    TPro  5.6<L>  /  Alb  1.9<L>  /  TBili  1.3<H>  /  DBili  x   /  AST  64<H>  /  ALT  73  /  AlkPhos  865<H>                            11.2   19.0  )-----------( 94       ( 2017 04:35 )             34.0     0713    130<L>  |  96  |  20  ----------------------------<  101<H>  3.5   |  23  |  0.75    Ca    7.8<L>      2017 04:35    TPro  5.5<L>  /  Alb  1.8<L>  /  TBili  0.9  /  DBili  x   /  AST  154<H>  /  ALT  107<H>  /  AlkPhos  825<H>                              10.9   13.7  )-----------( 84       ( 2017 05:02 )             32.2     07-12    134<L>  |  101  |  20  ----------------------------<  86  3.4<L>   |  25  |  0.72    Ca    7.9<L>      2017 05:02    TPro  5.5<L>  /  Alb  1.8<L>  /  TBili  0.9  /  DBili  x   /  AST  154<H>  /  ALT  107<H>  /  AlkPhos  825<H>                              10.5   16.1  )-----------( 130      ( 2017 05:46 )             30.4     07-11    135  |  101  |  25<H>  ----------------------------<  106<H>  3.7   |  27  |  0.76    Ca    8.3<L>      2017 05:46    TPro  6.3  /  Alb  2.1<L>  /  TBili  0.9  /  DBili  x   /  AST  195<H>  /  ALT  110<H>  /  AlkPhos  815<H>  0711     LIVER FUNCTIONS - ( 2017 05:46 )  Alb: 2.1 g/dL / Pro: 6.3 gm/dL / ALK PHOS: 815 U/L / ALT: 110 U/L / AST: 195 U/L / GGT: x           Urinalysis Basic - ( 10 Jul 2017 15:43 )    Color: Yellow / Appearance: Clear / S.020 / pH: x  Gluc: x / Ketone: Negative  / Bili: Negative / Urobili: 1 mg/dL   Blood: x / Protein: 30 mg/dL / Nitrite: Negative   Leuk Esterase: Trace / RBC: 3-5 /HPF / WBC 6-10   Sq Epi: x / Non Sq Epi: x / Bacteria: Many    Culture - Blood (07.10.17 @ 15:43)    Gram Stain:   Growth in anaerobic bottle: Gram Negative Rods    Specimen Source: .Blood None    Culture Results:   Growth in anaerobic bottle: Gram Negative Rods  ***Blood Panel PCR results on this specimen are available  approximately 3 hours after the Gram stain result.***  Gram stain, PCR, and/or culture results may not always  correspond due to difference in methodologies.    Culture - Blood (07.10.17 @ 15:43)    Gram Stain:   Growth in aerobic and anaerobic bottles: Gram Negative Rods    Specimen Source: .Blood None    Culture Results:   Growth in aerobic and anaerobic bottles: Gram Negative Rods  See previous culture [id number]    Culture - Body Fluid with Gram Stain (17 @ 14:39)    Gram Stain:   No polymorphonuclear cells seen per low power field  Numerous Gram Positive Cocci in chains per oil power field    Specimen Source: .Body Fluid None    Culture - Fungal, Blood (07.10.17 @ 15:59)    Specimen Source: .Blood Blood-Peripheral    Culture Results:   Raoultella planticola isolated  No fungus isolated at 1 week.    Culture - Body Fluid with Gram Stain (17 @ 14:39)    -  Cefazolin: S <=2    -  Cefazolin: S <=2    -  Ertapenem: S <=0.5    -  Ertapenem: S <=0.5    -  Erythromycin: R >4    -  Gentamicin: S <=1    -  Gentamicin: S <=1    -  Levofloxacin: S <=1    -  Levofloxacin: S <=1    -  Trimethoprim/Sulfamethoxazole: S <=0.5/9.5    -  Trimethoprim/Sulfamethoxazole: S <=0.5/9.5    -  Amikacin: S <=8    -  Amikacin: S <=8    -  Ampicillin: R >16    -  Ampicillin: S 8    -  Aztreonam: S <=4    -  Aztreonam: S <=4    -  Piperacillin/Tazobactam: S <=8    -  Piperacillin/Tazobactam: S <=8    -  Tetra/Doxy: R >8    -  Tobramycin: S <=2    -  Tobramycin: S <=2    -  Cefepime: S <=2    -  Cefepime: S <=2    -  Ceftriaxone: S <=1    -  Ceftriaxone: S <=1    -  Ciprofloxacin: S <=0.5    -  Ciprofloxacin: S <=0.5    -  Vancomycin: S 2    Gram Stain:   No polymorphonuclear cells seen per low power field  Numerous Gram Positive Cocci in chains per oil power field    -  Ampicillin: S <=2    -  Ampicillin/Sulbactam: S <=4/2    -  Ampicillin/Sulbactam: S <=4/2    -  Cefoxitin: S <=4    -  Cefoxitin: S <=4    -  Ceftazidime: S <=1    -  Ceftazidime: S <=1    -  Ciprofloxacin: S <=1    -  Imipenem: S <=1    -  Imipenem: S <=1    -  Meropenem: S <=1    -  Meropenem: S <=1    Specimen Source: .Body Fluid None    Culture Results:   Moderate Escherichia coli  Moderate Citrobacter koseri  Few Enterococcus faecalis  Moderate Alpha hemolytic strep  Rare Raoultella planticola    Organism Identification: Escherichia coli  Citrobacter koseri  Enterococcus faecalis    Organism: Citrobacter koseri    Organism: Enterococcus faecalis    Organism: Escherichia coli    Method Type: ISABELLA    Method Type: ISABELLA    Method Type: ISABELLA        Radiology:    < from: CT Abdomen and Pelvis w/ Oral Cont and w/ IV Cont (07.10.17 @ 17:47) >  Large hepatic mass involving the caudate lobe and right hepatic lobe with   tumor thrombus within the main portal vein and proximal splenic and   Culture - Urine (07.10.17 @ 15:43)    Specimen Source: .Urine None    Culture Results:   <10,000 CFU/ml Normal Urogenital lenin present    superior mesenteric vein with additional thrombus seen into the   suprahepatic IVC just proximal to the right atrium, with numerous   additional hepatic lesions in the setting of a cirrhotic liver suggesting   metastatic hepatocellular carcinoma.    Large paraesophageal and gastric varices.    Mild abdominal and pelvic ascites with suggestion of carcinomatosis.    Right pleural effusion.    Common bile duct stent visualized with left-sided biliary dilatation.        Advanced directives addressed: full resuscitation

## 2017-07-15 NOTE — PROGRESS NOTE ADULT - ASSESSMENT
68 y/o Male with h/o IDDM, Liver CA s/p chemo (last cycle 1 week PTA with Dr Baez-oncology at [place]), chronic pain was admitted on 7/10 for confusion, hypoglycemia and hypothermia. He was BIBEMS after being found  unresponsive and hypothermic with rectal temp 90F. Denies fevers, but states he feels chills and nausea. He may have had diarrhea since family states he was found in a "whole lot of liquid" in his bed. Pt reports feeling confused.  Reports having decreased PO intake for several days. Patient reports chronic abdominal pain for which he takes oxycontin at home. In ER, he received vancomycin IV and cefepime.     1. Sepsis with roultella planticola resolving. Acute cholecystitis/cholangitis with E.coli, CIKO, RAPL, ENFA, alpha hemolytic strep. Large liver mass with portal vein, splenic vein, superior mesenteric vein and suprahepatic vein thrombosis. Hepatocellular carcinoma. Possible septic thrombus. Likely abdominal carcinomatosis. Immunocompromised host.   -leukocytosis is persistent  -on cefepime 2 gm IV q12h # 5  -tolerating abx well so far; no side effects noted  -f/u repeat BC x 2  -change abx to zosyn 3.375 gm IV q8h for better enterococcal coverage  -prognosis is poor; palleative care evaluation appreciated  -monitor temps  -f/u CBC  -supportive care  2. Other issues: Hypoglycemia  -care per medicine

## 2017-07-15 NOTE — PROGRESS NOTE ADULT - ASSESSMENT
Pt is a 67y old Male with hx of NIDDM, Liver CA  (Dr Baez-oncology at [place])who presents to ED after being found  unresponsive, hypothermic and hypoglycemic. 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    Assessment and Plan:  1) Pain  -R/T hepatocellular Carcinoma  -R/T Acute cholecystitis   -S/P cholecystotomy drain placement 7/13  -Increase Oxycodone to 10 mg PO Q4H PRN mod pain  -Increase Oxycodone to 15 mg PO Q4H PRN severe pain  -Add Dilaudid 0.5 mg IVP Q4H PRN severe pain if unable to take PO   -Increase Oxycontin to 30 mg PO Q8H  -Supportive care  2) Hepatocellular carcinoma.    - hepatocellular carcinoma with peritoneal carcinomatosis with extensive mesenteric, portal vein, IVC thrombosis  -Abd CT noted  -GI eval noted  -Vascular eval noted  -Not a candidate for intervention  -Supportive care  -Follows with onc at [facility]  -Onc eval pending  Received chemo 1 week prior to adm  -Would like to follow up with [facility] Dr Baez  3) Sepsis  -R/T ? acute cholecystitis  -Percutaneous drain placed to day 7/13  -HIDA scan noted  -ID eval noted  -hypothermia resolved  -leukocytosis worsening  -Bld C&S pos  -f/u BC x 2  -continue abx coverage as per ID  -monitor temps  -f/u CBC  4) Malnutrition  -Poor PO intake  -Albumin=1.8  -Severe protein calorie  5) Constipation  -Cont Ducolax/Senna  -Monitor daily  6) Advance directives  -Pt with capacity  -Discussed HCP Named Oscar Lanza   -Agrees with GOC discussion with HCP  -Met with pt and HCP to discuss GOC  -DNR/DNI on chart  -Awaiting oncology eval and would pursue chemo if available  -Reviewed options including hospice care if ineligible for further treatment

## 2017-07-15 NOTE — PROGRESS NOTE ADULT - SUBJECTIVE AND OBJECTIVE BOX
HPI:  67y M PMH IDDM, Liver CA last chemo per patient was 1 week ago (Dr Baez-oncology at [facility]), Smoking, Prior ETOH, presents to ED BIBEMS after being found  unresponsive and was found to be hypothermic Rectal temp 90F and hypoglycemic   Fingerstick on arrival in ED 24, received 2 amps D50, subsequent fingerstick 118, repeated 10 minutes later, 118.  Denies fevers, but states he feels chills and nausea.  Rectal temp in room 90.  Denies vomiting, diarrhea, headache, SOB or cp, however family state he was found in a "whole lot of liquid" in his bed.  Pt reports feeling confused.  Reports having decreased PO intake for several days.  Denies trauma or travel.  Patient and family are unsure of what medications he takes.  His normal routine care is through [facility].  Patient reports chronic abdominal pain for which he takes oxycontin at home, unsure of dose . Denies any new or worsening of his chronic abdominal pain     7/11: c/o abd pain, gen  FS still running low    7/12: FS still low today    7/13: Feeling better    7/14: feels better, afebrile, some appetite back  DNR/DNI   FS better, Hypoglycemia resolved    7/15: feels much better but c/o constipation on and off.  DNR/DNI  FS elevated      PHYSICAL EXAM:    Daily     Daily     ICU Vital Signs Last 24 Hrs  T(C): 37.1 (15 Jul 2017 04:59), Max: 37.1 (15 Jul 2017 04:59)  T(F): 98.7 (15 Jul 2017 04:59), Max: 98.7 (15 Jul 2017 04:59)  HR: 70 (15 Jul 2017 04:59) (70 - 100)  BP: 118/57 (15 Jul 2017 04:59) (107/66 - 133/63)  BP(mean): 74 (14 Jul 2017 16:00) (68 - 76)  ABP: --  ABP(mean): --  RR: 16 (15 Jul 2017 04:59) (16 - 18)  SpO2: 97% (15 Jul 2017 04:59) (94% - 98%)      Constitutional: Well appearing  HEENT: Atraumatic, FLORENCIA, Normal, No congestion  Respiratory: Breath Sounds normal, no rhonchi/wheeze  Cardiovascular: N S1S2; JOANNA present  Gastrointestinal: Abdomen ascitic, non tender, Bowel Sounds present  Extremities: No edema, peripheral pulses present  Neurological: AAO x 3, no gross focal motor deficits  Skin: Non cellulitic, no rash, ulcers  Lymph Nodes: No lymphadenopathy noted  Back: No CVA tenderness   Musculoskeletal: non tender  Breasts: Deferred  Genitourinary: deferred  Rectal: Deferred                          11.4   22.8  )-----------( 134      ( 15 Jul 2017 05:44 )             34.2       CBC Full  -  ( 15 Jul 2017 05:44 )  WBC Count : 22.8 K/uL  Hemoglobin : 11.4 g/dL  Hematocrit : 34.2 %  Platelet Count - Automated : 134 K/uL  Mean Cell Volume : 96.6 fl  Mean Cell Hemoglobin : 32.1 pg  Mean Cell Hemoglobin Concentration : 33.2 gm/dL  Auto Neutrophil # : x  Auto Lymphocyte # : x  Auto Monocyte # : x  Auto Eosinophil # : x  Auto Basophil # : x  Auto Neutrophil % : x  Auto Lymphocyte % : x  Auto Monocyte % : x  Auto Eosinophil % : x  Auto Basophil % : x      07-15    133<L>  |  98  |  27<H>  ----------------------------<  244<H>  3.8   |  27  |  1.01    Ca    8.2<L>      15 Jul 2017 05:44    TPro  5.7<L>  /  Alb  1.9<L>  /  TBili  1.3<H>  /  DBili  x   /  AST  64<H>  /  ALT  61  /  AlkPhos  898<H>  07-15      LIVER FUNCTIONS - ( 15 Jul 2017 05:44 )  Alb: 1.9 g/dL / Pro: 5.7 gm/dL / ALK PHOS: 898 U/L / ALT: 61 U/L / AST: 64 U/L / GGT: x             PT/INR - ( 15 Jul 2017 05:44 )   PT: 15.8 sec;   INR: 1.45 ratio                           MEDICATIONS  (STANDING):  insulin lispro (HumaLOG) corrective regimen sliding scale   SubCutaneous three times a day before meals  dextrose 50% Injectable 12.5 Gram(s) IV Push once  dextrose 50% Injectable 25 Gram(s) IV Push once  dextrose 50% Injectable 25 Gram(s) IV Push once  furosemide    Tablet 20 milliGRAM(s) Oral daily  tamsulosin 0.4 milliGRAM(s) Oral at bedtime  docusate sodium 100 milliGRAM(s) Oral three times a day  piperacillin/tazobactam IVPB. 3.375 Gram(s) IV Intermittent every 8 hours  oxyCODONE  ER Tablet 30 milliGRAM(s) Oral every 8 hours  pantoprazole    Tablet 40 milliGRAM(s) Oral daily

## 2017-07-16 DIAGNOSIS — K81.0 ACUTE CHOLECYSTITIS: ICD-10-CM

## 2017-07-16 LAB
INR BLD: 1.48 RATIO — HIGH (ref 0.88–1.16)
PROTHROM AB SERPL-ACNC: 16.1 SEC — HIGH (ref 9.8–12.7)

## 2017-07-16 RX ORDER — INSULIN GLARGINE 100 [IU]/ML
20 INJECTION, SOLUTION SUBCUTANEOUS AT BEDTIME
Qty: 0 | Refills: 0 | Status: DISCONTINUED | OUTPATIENT
Start: 2017-07-16 | End: 2017-07-20

## 2017-07-16 RX ORDER — MULTIVIT-MIN/FERROUS GLUCONATE 9 MG/15 ML
1 LIQUID (ML) ORAL DAILY
Qty: 0 | Refills: 0 | Status: DISCONTINUED | OUTPATIENT
Start: 2017-07-16 | End: 2017-07-21

## 2017-07-16 RX ADMIN — HYDROMORPHONE HYDROCHLORIDE 0.5 MILLIGRAM(S): 2 INJECTION INTRAMUSCULAR; INTRAVENOUS; SUBCUTANEOUS at 06:08

## 2017-07-16 RX ADMIN — TAMSULOSIN HYDROCHLORIDE 0.4 MILLIGRAM(S): 0.4 CAPSULE ORAL at 21:38

## 2017-07-16 RX ADMIN — OXYCODONE HYDROCHLORIDE 30 MILLIGRAM(S): 5 TABLET ORAL at 15:44

## 2017-07-16 RX ADMIN — Medication 2: at 12:36

## 2017-07-16 RX ADMIN — PIPERACILLIN AND TAZOBACTAM 25 GRAM(S): 4; .5 INJECTION, POWDER, LYOPHILIZED, FOR SOLUTION INTRAVENOUS at 21:38

## 2017-07-16 RX ADMIN — Medication 100 MILLIGRAM(S): at 05:13

## 2017-07-16 RX ADMIN — HYDROMORPHONE HYDROCHLORIDE 0.5 MILLIGRAM(S): 2 INJECTION INTRAMUSCULAR; INTRAVENOUS; SUBCUTANEOUS at 19:13

## 2017-07-16 RX ADMIN — PIPERACILLIN AND TAZOBACTAM 25 GRAM(S): 4; .5 INJECTION, POWDER, LYOPHILIZED, FOR SOLUTION INTRAVENOUS at 13:28

## 2017-07-16 RX ADMIN — HYDROMORPHONE HYDROCHLORIDE 0.5 MILLIGRAM(S): 2 INJECTION INTRAMUSCULAR; INTRAVENOUS; SUBCUTANEOUS at 18:43

## 2017-07-16 RX ADMIN — PANTOPRAZOLE SODIUM 40 MILLIGRAM(S): 20 TABLET, DELAYED RELEASE ORAL at 11:28

## 2017-07-16 RX ADMIN — INSULIN GLARGINE 20 UNIT(S): 100 INJECTION, SOLUTION SUBCUTANEOUS at 21:38

## 2017-07-16 RX ADMIN — OXYCODONE HYDROCHLORIDE 30 MILLIGRAM(S): 5 TABLET ORAL at 05:13

## 2017-07-16 RX ADMIN — LACTULOSE 10 GRAM(S): 10 SOLUTION ORAL at 05:13

## 2017-07-16 RX ADMIN — Medication 4: at 17:56

## 2017-07-16 RX ADMIN — OXYCODONE HYDROCHLORIDE 30 MILLIGRAM(S): 5 TABLET ORAL at 21:39

## 2017-07-16 RX ADMIN — Medication 1 TABLET(S): at 18:36

## 2017-07-16 RX ADMIN — OXYCODONE HYDROCHLORIDE 30 MILLIGRAM(S): 5 TABLET ORAL at 13:29

## 2017-07-16 RX ADMIN — OXYCODONE HYDROCHLORIDE 30 MILLIGRAM(S): 5 TABLET ORAL at 05:43

## 2017-07-16 RX ADMIN — HYDROMORPHONE HYDROCHLORIDE 0.5 MILLIGRAM(S): 2 INJECTION INTRAMUSCULAR; INTRAVENOUS; SUBCUTANEOUS at 23:39

## 2017-07-16 RX ADMIN — Medication 4: at 08:27

## 2017-07-16 RX ADMIN — OXYCODONE HYDROCHLORIDE 30 MILLIGRAM(S): 5 TABLET ORAL at 22:09

## 2017-07-16 RX ADMIN — Medication 20 MILLIGRAM(S): at 05:13

## 2017-07-16 RX ADMIN — Medication 100 MILLIGRAM(S): at 13:29

## 2017-07-16 RX ADMIN — HYDROMORPHONE HYDROCHLORIDE 0.5 MILLIGRAM(S): 2 INJECTION INTRAMUSCULAR; INTRAVENOUS; SUBCUTANEOUS at 06:38

## 2017-07-16 RX ADMIN — PIPERACILLIN AND TAZOBACTAM 25 GRAM(S): 4; .5 INJECTION, POWDER, LYOPHILIZED, FOR SOLUTION INTRAVENOUS at 05:13

## 2017-07-16 RX ADMIN — Medication 100 MILLIGRAM(S): at 21:38

## 2017-07-16 NOTE — CONSULT NOTE ADULT - CONSULT REQUESTED DATE/TIME
10-Jul-2017
11-Jul-2017 07:24
11-Jul-2017 08:21
11-Jul-2017 19:34
12-Jul-2017 13:11
16-Jul-2017 10:38

## 2017-07-16 NOTE — CONSULT NOTE ADULT - PROBLEM SELECTOR RECOMMENDATION 2
secondary to malignancy and cirrhosis  not on AC due to bleeding risk with large varices  prognosis is very poor

## 2017-07-16 NOTE — PROGRESS NOTE ADULT - ASSESSMENT
Pt is a 67y old Male with hx of NIDDM, Liver CA  (Dr Baez-oncology at [place])who presents to ED after being found  unresponsive, hypothermic and hypoglycemic. 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    Assessment and Plan:  1) Pain  -R/T hepatocellular Carcinoma  -R/T Acute cholecystitis   -S/P cholecystotomy drain placement 7/13  -Continue Oxycodone 10 mg PO Q4H PRN mod pain  -Continue Oxycodone 15 mg PO Q4H PRN severe pain  -Continue Dilaudid 0.5 mg IVP PRN SEVERE PAIN unrelieved by Oxycodone  -Add Dilaudid 0.5 mg IVP Q4H PRN severe pain if unable to take PO   -Continue Oxycontin  30 mg PO Q8H  -Supportive care  -In-patient hospice referral would be appropriate if pt agrees; await oncology input  2) Hepatocellular carcinoma.    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 acute cholecystitis  -Percutaneous drain placed to day 7/13  -HIDA scan noted  -ID eval noted  -hypothermia resolved  -leukocytosis  -Bld C&S pos  -continue abx coverage as per ID  4) Malnutrition  -Poor PO intake  -Albumin=1.8  -Severe protein calorie  5) Constipation  -Resolved  -Cont Ducolax/Senna  -Monitor daily  6) Advance directives  -Pt with capacity  -Discussed HCP Named Oscar Myla   -Agrees with GOC discussion with HCP  -Met with pt and HCP to discuss GOC  -DNR/DNI on chart  -Awaiting oncology eval and would pursue chemo if available  -Reviewed options including hospice care if ineligible for further treatment

## 2017-07-16 NOTE — PROVIDER CONTACT NOTE (OTHER) - DATE AND TIME:
13-Jul-2017 03:45
11-Jul-2017 02:02
11-Jul-2017 02:03
11-Jul-2017 02:04
14-Jul-2017 12:28
16-Jul-2017 04:36

## 2017-07-16 NOTE — PROGRESS NOTE ADULT - SUBJECTIVE AND OBJECTIVE BOX
Patient is a 67y old  Male who presents with a chief complaint of unresponsiveness (10 Jul 2017 22:50)    HPI:  66 y/o Male with h/o IDDM, Liver CA s/p chemo (last cycle 1 week PTA with Dr Baez-oncology at [facility]), chronic pain was admitted on 7/10 for confusion, hypoglycemia and hypothermia. He was BIBEMS after being found  unresponsive and hypothermic with rectal temp 90F. Denies fevers, but states he feels chills and nausea. He may have had diarrhea since family states he was found in a "whole lot of liquid" in his bed. Pt reports feeling confused.  Reports having decreased PO intake for several days. Patient reports chronic abdominal pain for which he takes oxycontin at home. In ER, he received vancomycin IV and cefepime.     Weak looking  No fever or chills  Abdomen feels distended  Denies pain    MEDICATIONS  (STANDING):  furosemide    Tablet 20 milliGRAM(s) Oral daily  tamsulosin 0.4 milliGRAM(s) Oral at bedtime  docusate sodium 100 milliGRAM(s) Oral three times a day  piperacillin/tazobactam IVPB. 3.375 Gram(s) IV Intermittent every 8 hours  oxyCODONE  ER Tablet 30 milliGRAM(s) Oral every 8 hours  pantoprazole    Tablet 40 milliGRAM(s) Oral daily  lactulose Syrup 10 Gram(s) Oral three times a day  polyethylene glycol 3350 17 Gram(s) Oral daily  insulin glargine Injectable (LANTUS) 10 Unit(s) SubCutaneous at bedtime  insulin lispro (HumaLOG) corrective regimen sliding scale   SubCutaneous three times a day before meals  insulin lispro (HumaLOG) corrective regimen sliding scale   SubCutaneous at bedtime  dextrose 5%. 1000 milliLiter(s) (50 mL/Hr) IV Continuous <Continuous>  dextrose 50% Injectable 12.5 Gram(s) IV Push once  dextrose 50% Injectable 25 Gram(s) IV Push once  dextrose 50% Injectable 25 Gram(s) IV Push once    MEDICATIONS  (PRN):  ondansetron Injectable 4 milliGRAM(s) IV Push every 8 hours PRN Nausea  glucagon  Injectable 1 milliGRAM(s) IntraMuscular once PRN Glucose LESS THAN 70 milligrams/deciliter  oxyCODONE    IR 10 milliGRAM(s) Oral every 4 hours PRN Moderate Pain (4 - 6)  oxyCODONE    IR 15 milliGRAM(s) Oral every 4 hours PRN Severe Pain (7 - 10)  HYDROmorphone  Injectable 0.5 milliGRAM(s) IV Push every 3 hours PRN Severe Pain (7 - 10)  bisacodyl Suppository 10 milliGRAM(s) Rectal daily PRN Constipation  senna 2 Tablet(s) Oral at bedtime PRN Constipation  dextrose Gel 1 Dose(s) Oral once PRN Blood Glucose LESS THAN 70 milliGRAM(s)/deciliter      Vital Signs Last 24 Hrs  T(C): 36.8 (2017 04:46), Max: 36.8 (15 Jul 2017 21:00)  T(F): 98.3 (2017 04:46), Max: 98.3 (15 Jul 2017 21:00)  HR: 92 (2017 04:46) (92 - 92)  BP: 127/71 (2017 04:46) (106/62 - 127/71)  BP(mean): --  RR: 16 (2017 04:46) (16 - 16)  SpO2: 95% (2017 04:46) (94% - 96%)    Physical Exam:    Constitutional: frail looking  HEENT: NC/AT, EOMI, PERRLA  Neck: supple  Back: no tenderness  Respiratory: clear  Cardiovascular: S1S2 regular, no murmurs  Abdomen: soft, not tender, distended, positive BS; cholecystotomy tube in place  Genitourinary: deferred  Rectal: deferred  Musculoskeletal: no muscle tenderness, no joint swelling or tenderness  Extremities: no pedal edema  Neurological: AxOx3, moving all extremities, no focal deficits  Skin: no rashes    Labs:                                   11.4   22.8  )-----------( 134      ( 15 Jul 2017 05:44 )             34.2     07-15    133<L>  |  98  |  27<H>  ----------------------------<  244<H>  3.8   |  27  |  1.01    Ca    8.2<L>      15 Jul 2017 05:44    TPro  5.7<L>  /  Alb  1.9<L>  /  TBili  1.3<H>  /  DBili  x   /  AST  64<H>  /  ALT  61  /  AlkPhos  898<H>  07-15           Cultures:                         11.2   19.2  )-----------( 102      ( 2017 05:28 )             33.6     07-14    131<L>  |  99  |  25<H>  ----------------------------<  156<H>  3.5   |  24  |  0.84    Ca    8.1<L>      2017 05:28    TPro  5.6<L>  /  Alb  1.9<L>  /  TBili  1.3<H>  /  DBili  x   /  AST  64<H>  /  ALT  73  /  AlkPhos  865<H>                            11.2   19.0  )-----------( 94       ( 2017 04:35 )             34.0     0713    130<L>  |  96  |  20  ----------------------------<  101<H>  3.5   |  23  |  0.75    Ca    7.8<L>      2017 04:35    TPro  5.5<L>  /  Alb  1.8<L>  /  TBili  0.9  /  DBili  x   /  AST  154<H>  /  ALT  107<H>  /  AlkPhos  825<H>                              10.9   13.7  )-----------( 84       ( 2017 05:02 )             32.2     0712    134<L>  |  101  |  20  ----------------------------<  86  3.4<L>   |  25  |  0.72    Ca    7.9<L>      2017 05:02    TPro  5.5<L>  /  Alb  1.8<L>  /  TBili  0.9  /  DBili  x   /  AST  154<H>  /  ALT  107<H>  /  AlkPhos  825<H>                              10.5   16.1  )-----------( 130      ( 2017 05:46 )             30.4     07-11    135  |  101  |  25<H>  ----------------------------<  106<H>  3.7   |  27  |  0.76    Ca    8.3<L>      2017 05:46    TPro  6.3  /  Alb  2.1<L>  /  TBili  0.9  /  DBili  x   /  AST  195<H>  /  ALT  110<H>  /  AlkPhos  815<H>  07-11     LIVER FUNCTIONS - ( 2017 05:46 )  Alb: 2.1 g/dL / Pro: 6.3 gm/dL / ALK PHOS: 815 U/L / ALT: 110 U/L / AST: 195 U/L / GGT: x           Urinalysis Basic - ( 10 Jul 2017 15:43 )    Color: Yellow / Appearance: Clear / S.020 / pH: x  Gluc: x / Ketone: Negative  / Bili: Negative / Urobili: 1 mg/dL   Blood: x / Protein: 30 mg/dL / Nitrite: Negative   Leuk Esterase: Trace / RBC: 3-5 /HPF / WBC 6-10   Sq Epi: x / Non Sq Epi: x / Bacteria: Many    Culture - Blood (07.10.17 @ 15:43)    Gram Stain:   Growth in anaerobic bottle: Gram Negative Rods    Specimen Source: .Blood None    Culture Results:   Growth in anaerobic bottle: Gram Negative Rods  ***Blood Panel PCR results on this specimen are available  approximately 3 hours after the Gram stain result.***  Gram stain, PCR, and/or culture results may not always  correspond due to difference in methodologies.    Culture - Blood (07.10.17 @ 15:43)    Gram Stain:   Growth in aerobic and anaerobic bottles: Gram Negative Rods    Specimen Source: .Blood None    Culture Results:   Growth in aerobic and anaerobic bottles: Gram Negative Rods  See previous culture [id number]    Culture - Body Fluid with Gram Stain (17 @ 14:39)    Gram Stain:   No polymorphonuclear cells seen per low power field  Numerous Gram Positive Cocci in chains per oil power field    Specimen Source: .Body Fluid None    Culture - Fungal, Blood (07.10.17 @ 15:59)    Specimen Source: .Blood Blood-Peripheral    Culture Results:   Raoultella planticola isolated  No fungus isolated at 1 week.    Culture - Body Fluid with Gram Stain (17 @ 14:39)    -  Cefazolin: S <=2    -  Cefazolin: S <=2    -  Ertapenem: S <=0.5    -  Ertapenem: S <=0.5    -  Erythromycin: R >4    -  Gentamicin: S <=1    -  Gentamicin: S <=1    -  Levofloxacin: S <=1    -  Levofloxacin: S <=1    -  Trimethoprim/Sulfamethoxazole: S <=0.5/9.5    -  Trimethoprim/Sulfamethoxazole: S <=0.5/9.5    -  Amikacin: S <=8    -  Amikacin: S <=8    -  Ampicillin: R >16    -  Ampicillin: S 8    -  Aztreonam: S <=4    -  Aztreonam: S <=4    -  Piperacillin/Tazobactam: S <=8    -  Piperacillin/Tazobactam: S <=8    -  Tetra/Doxy: R >8    -  Tobramycin: S <=2    -  Tobramycin: S <=2    -  Cefepime: S <=2    -  Cefepime: S <=2    -  Ceftriaxone: S <=1    -  Ceftriaxone: S <=1    -  Ciprofloxacin: S <=0.5    -  Ciprofloxacin: S <=0.5    -  Vancomycin: S 2    Gram Stain:   No polymorphonuclear cells seen per low power field  Numerous Gram Positive Cocci in chains per oil power field    -  Ampicillin: S <=2    -  Ampicillin/Sulbactam: S <=4/2    -  Ampicillin/Sulbactam: S <=4/2    -  Cefoxitin: S <=4    -  Cefoxitin: S <=4    -  Ceftazidime: S <=1    -  Ceftazidime: S <=1    -  Ciprofloxacin: S <=1    -  Imipenem: S <=1    -  Imipenem: S <=1    -  Meropenem: S <=1    -  Meropenem: S <=1    Specimen Source: .Body Fluid None    Culture Results:   Moderate Escherichia coli  Moderate Citrobacter koseri  Few Enterococcus faecalis  Moderate Alpha hemolytic strep  Rare Raoultella planticola    Organism Identification: Escherichia coli  Citrobacter koseri  Enterococcus faecalis    Organism: Citrobacter koseri    Organism: Enterococcus faecalis    Organism: Escherichia coli    Method Type: ISABELLA    Method Type: ISABELLA    Method Type: ISABELLA        Radiology:    < from: CT Abdomen and Pelvis w/ Oral Cont and w/ IV Cont (07.10.17 @ 17:47) >  Large hepatic mass involving the caudate lobe and right hepatic lobe with   tumor thrombus within the main portal vein and proximal splenic and   Culture - Urine (07.10.17 @ 15:43)    Specimen Source: .Urine None    Culture Results:   <10,000 CFU/ml Normal Urogenital lenin present    superior mesenteric vein with additional thrombus seen into the   suprahepatic IVC just proximal to the right atrium, with numerous   additional hepatic lesions in the setting of a cirrhotic liver suggesting   metastatic hepatocellular carcinoma.    Large paraesophageal and gastric varices.    Mild abdominal and pelvic ascites with suggestion of carcinomatosis.    Right pleural effusion.    Common bile duct stent visualized with left-sided biliary dilatation.        Advanced directives addressed: full resuscitation

## 2017-07-16 NOTE — PROVIDER CONTACT NOTE (OTHER) - SITUATION
Teagan De La Cruz called from [facility] regarding body fluid from gall bladder, numerous gram + cocci chains noted , no new orders given. ID already seen patient. Will continue to monitor patient.
Answering service aware of consult.
CALLED DR OSCAR SERVICE ON CONSULT...  SERVICE AWARE
Spoke with answering service, aware of consult.

## 2017-07-16 NOTE — CONSULT NOTE ADULT - PROBLEM SELECTOR RECOMMENDATION 9
advanced with one large mass and multiple other lesions  poor PS  in my opinion not a candidate for systemic chemotherapy  hospice appropriate  shall contact primary oncologist at [facility] monday

## 2017-07-16 NOTE — CONSULT NOTE ADULT - SUBJECTIVE AND OBJECTIVE BOX
HPI: Pt is a 67y old Male with hx of NIDDM, Liver CA  (Dr Baez-oncology at [facility])who presents to ED after being found  unresponsive, hypothermic and hypoglycemic. 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  The patient has been symptomatic since 11/17 and was recently diagnosed by liver biopsy at [facility]. he received his first chemo recently. he does not recall the details but it seems that gemcitabine was used.  he is s/p cholecystostomy T  Tube draining well  he was s/b Vascular surgery and GI and was thought to be high risk for anticoagulation due to large varices seen on CT.    currently comfortable      PAIN: ( X)Yes   ( )No  Level: Abd   Location: mid-right abd  Intensity:    6/10  Quality: intermittent  Aggravating Factors:  Alleviating Factors:  Radiation: towards right side  Duration/Timing:  Impact on ADLs: mod    DYSPNEA: ( ) Yes  (X ) No  Level:    PAST MEDICAL & SURGICAL HISTORY:  Insulin dependent diabetes mellitus  Liver cancer      SOCIAL HX:    States he is currently staying with his GF  homeless otherwise    history of ETOH use      FAMILY HISTORY:  No pertinent family history in first degree relatives  no history of liver ca  he does not have children or siblings      Review of Systems:    weakness  abdominal distension and pain      All other systems reviewed and negative  Unable to obtain/Limited due to:      PHYSICAL EXAM:    Vital Signs Last 24 Hrs  T(C): 36.8 (16 Jul 2017 04:46), Max: 36.8 (15 Jul 2017 21:00)  T(F): 98.3 (16 Jul 2017 04:46), Max: 98.3 (15 Jul 2017 21:00)  HR: 92 (16 Jul 2017 04:46) (92 - 92)  BP: 127/71 (16 Jul 2017 04:46) (106/62 - 127/71)  BP(mean): --  RR: 16 (16 Jul 2017 04:46) (16 - 16)  SpO2: 95% (16 Jul 2017 04:46) (94% - 96%)        General: Ill appearing male in bed in NAD  emaciated  bruises+  abdomen softly distended  spleen tip palpable  liver mass palpable  Mental Status: Alert and Oriented X 3  HEENT: Oral mucosa dry  Lungs: Clear diminished renee  Cardiac: S1S2+ edema1+  GI: abd softly distended + BS + mild tenderness  : Voids  Ext: KERN=strength  Neuro: No focal def                            11.4   22.8  )-----------( 134      ( 15 Jul 2017 05:44 )             34.2     07-15    133<L>  |  98  |  27<H>  ----------------------------<  244<H>  3.8   |  27  |  1.01    Ca    8.2<L>      15 Jul 2017 05:44    TPro  5.7<L>  /  Alb  1.9<L>  /  TBili  1.3<H>  /  DBili  x   /  AST  64<H>  /  ALT  61  /  AlkPhos  898<H>  07-15    LIVER FUNCTIONS - ( 15 Jul 2017 05:44 )  Alb: 1.9 g/dL / Pro: 5.7 gm/dL / ALK PHOS: 898 U/L / ALT: 61 U/L / AST: 64 U/L / GGT: x           PT/INR - ( 16 Jul 2017 06:56 )   PT: 16.1 sec;   INR: 1.48 ratio                 Allergies    No Known Allergies    Intolerances    MEDICATIONS  (STANDING):  furosemide    Tablet 20 milliGRAM(s) Oral daily  tamsulosin 0.4 milliGRAM(s) Oral at bedtime  docusate sodium 100 milliGRAM(s) Oral three times a day  piperacillin/tazobactam IVPB. 3.375 Gram(s) IV Intermittent every 8 hours  oxyCODONE  ER Tablet 30 milliGRAM(s) Oral every 8 hours  pantoprazole    Tablet 40 milliGRAM(s) Oral daily  lactulose Syrup 10 Gram(s) Oral three times a day  polyethylene glycol 3350 17 Gram(s) Oral daily  insulin glargine Injectable (LANTUS) 10 Unit(s) SubCutaneous at bedtime  insulin lispro (HumaLOG) corrective regimen sliding scale   SubCutaneous three times a day before meals  insulin lispro (HumaLOG) corrective regimen sliding scale   SubCutaneous at bedtime  dextrose 5%. 1000 milliLiter(s) (50 mL/Hr) IV Continuous <Continuous>  dextrose 50% Injectable 12.5 Gram(s) IV Push once  dextrose 50% Injectable 25 Gram(s) IV Push once  dextrose 50% Injectable 25 Gram(s) IV Push once      RADIOLOGY/ADDITIONAL STUDIES:      < from: CT Abdomen and Pelvis w/ Oral Cont and w/ IV Cont (07.10.17 @ 17:47) >      Liver: Large hypovascular mass which appears to be emanating from the   medial right hepatic lobe and caudate lobe extending into the posterior   aspect of the left hepatic lobe measuring at least 10.0 x 9.3 cm. A   posterior inferior right hepatic hypovascular lesion measuring 3.1 x 2.5   cm. Additional smaller scattered hepatic lesions. Sclerotic features of   the liver. Therefore findings may suggest multifocal HCC or a single   large HCC with internal hepatic metastasis. Additional fillingdefects   seen extending into the suprahepatic IVC as well as distending and   enlarging the main portal vein to the level the portal confluence and   proximal splenic vein and superior mesenteric vein compatible with tumor   thrombus. Cavernous transformation of the portal vein through collaterals   is identified. A replaced right hepatic artery is visualized.   Paraesophageal varices and gastric varices.    Gallbladder: Distended gallbladder. Tiny droplets of air within the   gallbladder lumen.  Bile ducts: Pneumobilia. Common bile duct stent extending into the right   intrahepatic biliary radicles. Mild dilatation of the left intrahepatic   biliary tree.  Pancreas: within normal limits    Spleen: within normal limits  Adrenal: Nodular thickening of the bilateral adrenal glands suggesting   metastasis.    Kidneys, Ureters and Bladder:: Kidneys enhance bilaterally and   symmetrically without hydronephrosis. Subcentimeter hypodensity in the   posterior right mid to upper pole too small tocharacterize. No   intrarenal calculi. Nonspecific bilateral perinephric stranding. The   ureters and bladder are within normal limits.        Pelvis: Mild pelvic ascites. No pelvic adenopathy.    Bowel: The bowel is of normal course and caliber without evidence of   obstruction or gross bowel wall thickening. Normal appendix visualized.    Peritoneum: Mild abdominal pelvic ascites. Suggestion of nodular   thickening of the omentum which may represent peritoneal carcinomatosis.   No free air. No drainable fluid collections.    Retroperitoneum:     Subcentimeter retroperitoneal lymph nodes   non-specific in nature   Vessels: Atherosclerotic changes.        Abdominal wall: Mild anasarca. Left inguinal hernia containing ascites.  Bones: Degenerative changes. .        IMPRESSION:    Large hepatic mass involving the caudate lobe and right hepatic lobe with   tumor thrombus within the main portal vein and proximal splenic and   superior mesenteric vein with additional thrombus seen into the   suprahepatic IVC just proximal to the right atrium, with numerous   additional hepatic lesions in the setting of a cirrhotic liver suggesting   metastatic hepatocellular carcinoma.    Large paraesophageal and gastric varices.    Mild abdominal and pelvic ascites with suggestion of carcinomatosis.    Right pleural effusion.    Common bile duct stent visualized with left-sided biliary dilatation.      < end of copied text >  < from: NM Hepatobiliary Scan w/wo Gall Bladder (07.11.17 @ 16:12) >  EXAM:  NM HEPATOBILIARY IMG                            PROCEDURE DATE:  07/11/2017          INTERPRETATION:  CLINICAL INFORMATION: 67-year-old male with history of   metastatic hepatocellular carcinoma presents with right upper quadrant   abdominalpain and distended gallbladder on CT scan, evaluate for acute   cholecystitis.    RADIOPHARMACEUTICAL: 3 mCi Tc-99m-Mebrofenin, I.V.; 2 doses    TECHNIQUE:  Dynamic imaging of the anterior abdomen was performed for 2   hours after the injection of radiotracer followed by static images of the   abdomen in the anterior, right lateral and right anterior oblique   projections.  Morphine 2.4 mg I.V. and a second dose of radiotracer were   administered at 1 hour.      COMPARISON: No prior hepatobiliaryscan available. PET/CT abdomen/pelvis   7/10/2017    FINDINGS: There is heterogeneous uptake of radiotracer by the   hepatocytes. Activity is first seen in the bowel at 25 minutes. The   gallbladder is not visualized at any time during the study, despite   administration of morphine. There is good clearance of activity from the   liver at the end of the study.    IMPRESSION: Abnormal morphine-augmented hepatobiliary scan.    Findings consistent with cystic duct obstruction/acute cholecystitis.    Dr. Alvino Stover was notified of these results by telephone, on   11/11/2017, at about 4:15, with read back.          < end of copied text >

## 2017-07-16 NOTE — PROGRESS NOTE ADULT - SUBJECTIVE AND OBJECTIVE BOX
HPI:  67y M PMH IDDM, Liver CA last chemo per patient was 1 week ago (Dr Baez-oncology at [facility]), Smoking, Prior ETOH, presents to ED BIBEMS after being found  unresponsive and was found to be hypothermic Rectal temp 90F and hypoglycemic   Fingerstick on arrival in ED 24, received 2 amps D50, subsequent fingerstick 118, repeated 10 minutes later, 118.  Denies fevers, but states he feels chills and nausea.  Rectal temp in room 90.  Denies vomiting, diarrhea, headache, SOB or cp, however family state he was found in a "whole lot of liquid" in his bed.  Pt reports feeling confused.  Reports having decreased PO intake for several days.  Denies trauma or travel.  Patient and family are unsure of what medications he takes.  His normal routine care is through [facility].  Patient reports chronic abdominal pain for which he takes oxycontin at home, unsure of dose . Denies any new or worsening of his chronic abdominal pain     7/11: c/o abd pain, gen  FS still running low    7/12: FS still low today    7/13: Feeling better    7/14: feels better, afebrile, some appetite back  DNR/DNI   FS better, Hypoglycemia resolved    7/15: feels much better but c/o constipation on and off.  DNR/DNI  FS elevated    7/16: feels better  cholecystostomy tube leaking  FS elevated  remains afebrile and abd pain free    PHYSICAL EXAM:    Daily     Daily     ICU Vital Signs Last 24 Hrs  T(C): 36.3 (16 Jul 2017 14:21), Max: 36.8 (15 Jul 2017 21:00)  T(F): 97.4 (16 Jul 2017 14:21), Max: 98.3 (15 Jul 2017 21:00)  HR: 84 (16 Jul 2017 14:21) (84 - 92)  BP: 118/68 (16 Jul 2017 14:21) (118/68 - 127/71)  BP(mean): --  ABP: --  ABP(mean): --  RR: 16 (16 Jul 2017 14:21) (16 - 16)  SpO2: 96% (16 Jul 2017 14:21) (95% - 96%)      Constitutional: Well appearing  HEENT: Atraumatic, FLORENCIA, Normal, No congestion  Respiratory: Breath Sounds normal, no rhonchi/wheeze  Cardiovascular: N S1S2; JOANNA present  Gastrointestinal: Abdomen soft, non tender, Bowel Sounds present  Extremities: No edema, peripheral pulses present  Neurological: AAO x 3, no gross focal motor deficits  Skin: Non cellulitic, no rash, ulcers  Lymph Nodes: No lymphadenopathy noted  Back: No CVA tenderness   Musculoskeletal: non tender  Breasts: Deferred  Genitourinary: deferred  Rectal: Deferred                          11.4   22.8  )-----------( 134      ( 15 Jul 2017 05:44 )             34.2       CBC Full  -  ( 15 Jul 2017 05:44 )  WBC Count : 22.8 K/uL  Hemoglobin : 11.4 g/dL  Hematocrit : 34.2 %  Platelet Count - Automated : 134 K/uL  Mean Cell Volume : 96.6 fl  Mean Cell Hemoglobin : 32.1 pg  Mean Cell Hemoglobin Concentration : 33.2 gm/dL  Auto Neutrophil # : x  Auto Lymphocyte # : x  Auto Monocyte # : x  Auto Eosinophil # : x  Auto Basophil # : x  Auto Neutrophil % : x  Auto Lymphocyte % : x  Auto Monocyte % : x  Auto Eosinophil % : x  Auto Basophil % : x      07-15    133<L>  |  98  |  27<H>  ----------------------------<  244<H>  3.8   |  27  |  1.01    Ca    8.2<L>      15 Jul 2017 05:44    TPro  5.7<L>  /  Alb  1.9<L>  /  TBili  1.3<H>  /  DBili  x   /  AST  64<H>  /  ALT  61  /  AlkPhos  898<H>  07-15      LIVER FUNCTIONS - ( 15 Jul 2017 05:44 )  Alb: 1.9 g/dL / Pro: 5.7 gm/dL / ALK PHOS: 898 U/L / ALT: 61 U/L / AST: 64 U/L / GGT: x             PT/INR - ( 16 Jul 2017 06:56 )   PT: 16.1 sec;   INR: 1.48 ratio                           MEDICATIONS  (STANDING):  furosemide    Tablet 20 milliGRAM(s) Oral daily  tamsulosin 0.4 milliGRAM(s) Oral at bedtime  docusate sodium 100 milliGRAM(s) Oral three times a day  piperacillin/tazobactam IVPB. 3.375 Gram(s) IV Intermittent every 8 hours  oxyCODONE  ER Tablet 30 milliGRAM(s) Oral every 8 hours  pantoprazole    Tablet 40 milliGRAM(s) Oral daily  lactulose Syrup 10 Gram(s) Oral three times a day  polyethylene glycol 3350 17 Gram(s) Oral daily  insulin glargine Injectable (LANTUS) 10 Unit(s) SubCutaneous at bedtime  insulin lispro (HumaLOG) corrective regimen sliding scale   SubCutaneous three times a day before meals  insulin lispro (HumaLOG) corrective regimen sliding scale   SubCutaneous at bedtime  dextrose 5%. 1000 milliLiter(s) (50 mL/Hr) IV Continuous <Continuous>  dextrose 50% Injectable 12.5 Gram(s) IV Push once  dextrose 50% Injectable 25 Gram(s) IV Push once  dextrose 50% Injectable 25 Gram(s) IV Push once  multivitamin/minerals 1 Tablet(s) Oral daily

## 2017-07-16 NOTE — PROGRESS NOTE ADULT - ASSESSMENT
67/M admitted with     Problem/Plan - 1:  ·  Problem: Sepsis.  Plan: Sepsis with Raoultella planticola and sec to acute cholecystitis ; s/p per cholecystostomy by IR on 7/12 and paracentesis on 7/12  Admitted to med surg  change cefepime and flagyl to zosyn as per ID. cont zosyn iv as per ID.   urine cx negative; Blood cx shows Raoultella planticola    Problem/Plan - 2:  ·  Problem: Mesenteric vein thrombosis.  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  Discussed with IR, not a candidate for thrombolysis or thrombectomy.   d/c heparin drip as is high risk for variceal bleed and d/t thrombocytopenia. Discussed with GI, Dr. Alvino Stover.   Prognosis extremely poor.      Problem/Plan - 3:  ·  Problem: Pleural effusion.  Plan: Right pleural effusion secondary to third spacing (hypoalbuminemia)vs metastatic   s/p Right thoracentesis on 7/13 by IR; 1 liter cloudy fluid removed    Problem/Plan - 4:  ·  Problem: Hepatocellular carcinoma.  Plan: hepatocellullar carcinoma with peritoneal carcinomatosis with extensive mesenteric, portal vein, IVC thrombosis, ascites  Poor prognosis  #palliative consult.   oncology consult appreciated    Problem/Plan - 5:  ·  Problem: Insulin dependent diabetes mellitus + Hypoglycemia: resolved hypoglycemia; transfer to ; FS with ISS. Increase Lantus to 20 units at hs.    Problem/Plan - 6:  Problem: Essential hypertension. Plan: will avoid BP meds for now due to sepsis and hypotension. Monitor closely, BP normal with out meds.     Problem/Plan - 7;  Hyponatremia of 130 sec to 3rd spacing;  133 now.     Problem/Plan - 8:  constipation: cont senna, colace, add miralax, lactulose. Resolved    Problem/Plan - 9:  Leaking Cholecystostomy Tube: IR reconsult  poc discussed with pt, team.    Problem/Plan - 10:  Poor appetite: add MVI + multiminerals    Prognosis: Very Poor    Code: DNR/DNI

## 2017-07-16 NOTE — PROGRESS NOTE ADULT - ASSESSMENT
66 y/o Male with h/o IDDM, Liver CA s/p chemo (last cycle 1 week PTA with Dr Baez-oncology at [facility]), chronic pain was admitted on 7/10 for confusion, hypoglycemia and hypothermia. He was BIBEMS after being found  unresponsive and hypothermic with rectal temp 90F. Denies fevers, but states he feels chills and nausea. He may have had diarrhea since family states he was found in a "whole lot of liquid" in his bed. Pt reports feeling confused.  Reports having decreased PO intake for several days. Patient reports chronic abdominal pain for which he takes oxycontin at home. In ER, he received vancomycin IV and cefepime.     1. Sepsis with roultella planticola resolving. Acute cholecystitis/cholangitis with E.coli, CIKO, RAPL, ENFA, alpha hemolytic strep. Large liver mass with portal vein, splenic vein, superior mesenteric vein and suprahepatic vein thrombosis. Hepatocellular carcinoma. Possible septic thrombus. Likely abdominal carcinomatosis. Immunocompromised host.   -leukocytosis is persistent  -s/p cefepime 2 gm IV q12h # 5  -on zosyn IV # 2  -tolerating abx well so far; no side effects noted  -f/u repeat BC x 2  -continue abx coverage  -prognosis is poor; palleative care evaluation appreciated  -monitor temps  -f/u CBC  -supportive care  2. Other issues: Hypoglycemia  -care per medicine

## 2017-07-16 NOTE — PROGRESS NOTE ADULT - SUBJECTIVE AND OBJECTIVE BOX
HPI :Pt is a 67y old Male with hx of NIDDM, Liver CA  (Dr Baez-oncology at [facility])who presents to ED after being found  unresponsive, hypothermic and hypoglycemic. 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    Pt reports 5/10 epigastric pain - not relieved with po Oxycodone - is relieved with Dilaudid 0.5 mg IVP - has had 2 doses in past 24 hrs. Appetite good, constipation resolved.       PAIN: (x )Yes   ( )No  Level:epigastrium  Location:   Intensity:   5 /10  Quality:constant dull, ocassional sharp pain (with movement)  Aggravating Factors :movement  Alleviating Factors: IDilaudid  Radiation:right flank  Duration/Timing: continuous  Impact on ADLs:    DYSPNEA: ( ) Yes  ( x) No  Level:        Review of Systems:    Anxiety-no  Depression-no  Physical Discomfort-pain  Dyspnea-no  Constipation-resolved  Diarrhea-no  Nausea-no  Vomiting-no  Anorexia-no  Weight Loss- yes  Cough-no  Secretions-no  Fatigue-yes  Weakness-yes  Delirium-no    All other systems reviewed and negative  Unable to obtain/Limited due to:        PHYSICAL EXAM:    Vital Signs Last 24 Hrs  T(C): 36.8 (16 Jul 2017 04:46), Max: 36.8 (15 Jul 2017 21:00)  T(F): 98.3 (16 Jul 2017 04:46), Max: 98.3 (15 Jul 2017 21:00)  HR: 92 (16 Jul 2017 04:46) (92 - 92)  BP: 127/71 (16 Jul 2017 04:46) (106/62 - 127/71)  BP(mean): --  RR: 16 (16 Jul 2017 04:46) (16 - 16)  SpO2: 95% (16 Jul 2017 04:46) (94% - 96%)  Daily     Daily     PPSV2:  50 %  FAST:    General: Thin, appears comfortable  HEENT: Mucosa pink, moist   Lungs:Clear to P&A  Cardiac: RRR  GI: +BS soft, no tenderness  :  Ext:No edema  Neuro: No focal deficit      LABS:                        11.4   22.8  )-----------( 134      ( 15 Jul 2017 05:44 )             34.2     07-15    133<L>  |  98  |  27<H>  ----------------------------<  244<H>  3.8   |  27  |  1.01    Ca    8.2<L>      15 Jul 2017 05:44    TPro  5.7<L>  /  Alb  1.9<L>  /  TBili  1.3<H>  /  DBili  x   /  AST  64<H>  /  ALT  61  /  AlkPhos  898<H>  07-15    PT/INR - ( 16 Jul 2017 06:56 )   PT: 16.1 sec;   INR: 1.48 ratio           Albumin: Albumin, Serum: 1.9 g/dL (07-15 @ 05:44)      Allergies    No Known Allergies    Intolerances      MEDICATIONS  (STANDING):  furosemide    Tablet 20 milliGRAM(s) Oral daily  tamsulosin 0.4 milliGRAM(s) Oral at bedtime  docusate sodium 100 milliGRAM(s) Oral three times a day  piperacillin/tazobactam IVPB. 3.375 Gram(s) IV Intermittent every 8 hours  oxyCODONE  ER Tablet 30 milliGRAM(s) Oral every 8 hours  pantoprazole    Tablet 40 milliGRAM(s) Oral daily  lactulose Syrup 10 Gram(s) Oral three times a day  polyethylene glycol 3350 17 Gram(s) Oral daily  insulin glargine Injectable (LANTUS) 10 Unit(s) SubCutaneous at bedtime  insulin lispro (HumaLOG) corrective regimen sliding scale   SubCutaneous three times a day before meals  insulin lispro (HumaLOG) corrective regimen sliding scale   SubCutaneous at bedtime  dextrose 5%. 1000 milliLiter(s) (50 mL/Hr) IV Continuous <Continuous>  dextrose 50% Injectable 12.5 Gram(s) IV Push once  dextrose 50% Injectable 25 Gram(s) IV Push once  dextrose 50% Injectable 25 Gram(s) IV Push once    MEDICATIONS  (PRN):  ondansetron Injectable 4 milliGRAM(s) IV Push every 8 hours PRN Nausea  glucagon  Injectable 1 milliGRAM(s) IntraMuscular once PRN Glucose LESS THAN 70 milligrams/deciliter  oxyCODONE    IR 10 milliGRAM(s) Oral every 4 hours PRN Moderate Pain (4 - 6)  oxyCODONE    IR 15 milliGRAM(s) Oral every 4 hours PRN Severe Pain (7 - 10)  HYDROmorphone  Injectable 0.5 milliGRAM(s) IV Push every 3 hours PRN Severe Pain (7 - 10)  bisacodyl Suppository 10 milliGRAM(s) Rectal daily PRN Constipation  senna 2 Tablet(s) Oral at bedtime PRN Constipation  dextrose Gel 1 Dose(s) Oral once PRN Blood Glucose LESS THAN 70 milliGRAM(s)/deciliter      RADIOLOGY:

## 2017-07-17 LAB
CULTURE RESULTS: SIGNIFICANT CHANGE UP
CULTURE RESULTS: SIGNIFICANT CHANGE UP
INR BLD: 1.46 RATIO — HIGH (ref 0.88–1.16)
ORGANISM # SPEC MICROSCOPIC CNT: SIGNIFICANT CHANGE UP
PROTHROM AB SERPL-ACNC: 15.9 SEC — HIGH (ref 9.8–12.7)
SPECIMEN SOURCE: SIGNIFICANT CHANGE UP
SPECIMEN SOURCE: SIGNIFICANT CHANGE UP

## 2017-07-17 PROCEDURE — 47536 EXCHANGE BILIARY DRG CATH: CPT

## 2017-07-17 RX ADMIN — HYDROMORPHONE HYDROCHLORIDE 0.5 MILLIGRAM(S): 2 INJECTION INTRAMUSCULAR; INTRAVENOUS; SUBCUTANEOUS at 06:18

## 2017-07-17 RX ADMIN — OXYCODONE HYDROCHLORIDE 30 MILLIGRAM(S): 5 TABLET ORAL at 21:45

## 2017-07-17 RX ADMIN — OXYCODONE HYDROCHLORIDE 30 MILLIGRAM(S): 5 TABLET ORAL at 05:50

## 2017-07-17 RX ADMIN — Medication 100 MILLIGRAM(S): at 14:03

## 2017-07-17 RX ADMIN — HYDROMORPHONE HYDROCHLORIDE 0.5 MILLIGRAM(S): 2 INJECTION INTRAMUSCULAR; INTRAVENOUS; SUBCUTANEOUS at 00:09

## 2017-07-17 RX ADMIN — PIPERACILLIN AND TAZOBACTAM 25 GRAM(S): 4; .5 INJECTION, POWDER, LYOPHILIZED, FOR SOLUTION INTRAVENOUS at 05:20

## 2017-07-17 RX ADMIN — TAMSULOSIN HYDROCHLORIDE 0.4 MILLIGRAM(S): 0.4 CAPSULE ORAL at 21:45

## 2017-07-17 RX ADMIN — PIPERACILLIN AND TAZOBACTAM 25 GRAM(S): 4; .5 INJECTION, POWDER, LYOPHILIZED, FOR SOLUTION INTRAVENOUS at 14:02

## 2017-07-17 RX ADMIN — HYDROMORPHONE HYDROCHLORIDE 0.5 MILLIGRAM(S): 2 INJECTION INTRAMUSCULAR; INTRAVENOUS; SUBCUTANEOUS at 02:44

## 2017-07-17 RX ADMIN — Medication 2: at 09:26

## 2017-07-17 RX ADMIN — OXYCODONE HYDROCHLORIDE 30 MILLIGRAM(S): 5 TABLET ORAL at 14:02

## 2017-07-17 RX ADMIN — INSULIN GLARGINE 20 UNIT(S): 100 INJECTION, SOLUTION SUBCUTANEOUS at 21:46

## 2017-07-17 RX ADMIN — OXYCODONE HYDROCHLORIDE 30 MILLIGRAM(S): 5 TABLET ORAL at 05:20

## 2017-07-17 RX ADMIN — PIPERACILLIN AND TAZOBACTAM 25 GRAM(S): 4; .5 INJECTION, POWDER, LYOPHILIZED, FOR SOLUTION INTRAVENOUS at 21:45

## 2017-07-17 RX ADMIN — Medication 100 MILLIGRAM(S): at 21:45

## 2017-07-17 RX ADMIN — Medication 20 MILLIGRAM(S): at 05:20

## 2017-07-17 RX ADMIN — Medication 2: at 17:35

## 2017-07-17 RX ADMIN — HYDROMORPHONE HYDROCHLORIDE 0.5 MILLIGRAM(S): 2 INJECTION INTRAMUSCULAR; INTRAVENOUS; SUBCUTANEOUS at 05:48

## 2017-07-17 RX ADMIN — PANTOPRAZOLE SODIUM 40 MILLIGRAM(S): 20 TABLET, DELAYED RELEASE ORAL at 14:03

## 2017-07-17 RX ADMIN — Medication 100 MILLIGRAM(S): at 05:20

## 2017-07-17 RX ADMIN — Medication 1 TABLET(S): at 14:02

## 2017-07-17 RX ADMIN — Medication 2: at 14:03

## 2017-07-17 RX ADMIN — HYDROMORPHONE HYDROCHLORIDE 0.5 MILLIGRAM(S): 2 INJECTION INTRAMUSCULAR; INTRAVENOUS; SUBCUTANEOUS at 03:14

## 2017-07-17 NOTE — BRIEF OPERATIVE NOTE - PROCEDURE
Thoracentesis, right pleural cavity, with US guidance  07/13/2017    Active  NICHOLAS
Cholecystostomy, percutaneous, with imaging guidance  07/17/2017  Tube check and exchange with upsize from 8F to 10F  Active  NICHOLAS
Cholecystostomy, percutaneous, w image guidance  07/12/2017    Active  DAXELROD  Paracentesis, abdominal, with imaging guidance, diagnostic  07/12/2017    Active  DAXELROD

## 2017-07-17 NOTE — PROGRESS NOTE ADULT - ASSESSMENT
67/M admitted with     Problem/Plan - 1:  ·  Problem: Sepsis.  Plan: Sepsis with Raoultella planticola and sec to acute cholecystitis ; s/p per cholecystostomy by IR on 7/12 and paracentesis on 7/12  Admitted to med surg  change cefepime and flagyl to zosyn as per ID. cont zosyn iv as per ID.   urine cx negative; Blood cx shows Raoultella planticola    Problem/Plan - 2:  ·  Problem: Mesenteric vein thrombosis.  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  Discussed with IR, not a candidate for thrombolysis or thrombectomy.   d/c heparin drip as is high risk for variceal bleed and d/t thrombocytopenia. Discussed with GI, Dr. Alvino Stover.   Prognosis extremely poor.      Problem/Plan - 3:  ·  Problem: Pleural effusion.  Plan: Right pleural effusion secondary to third spacing (hypoalbuminemia)vs metastatic   s/p Right thoracentesis on 7/13 by IR; 1 liter cloudy fluid removed    Problem/Plan - 4:  ·  Problem: Hepatocellular carcinoma.  Plan: hepatocellullar carcinoma with peritoneal carcinomatosis with extensive mesenteric, portal vein, IVC thrombosis, ascites  Poor prognosis  #palliative consult.   oncology consult appreciated: Dr. Celaya would discuss on 7/17 with pt's oncology  at [facility] if any chemo is needed.     Problem/Plan - 5:  ·  Problem: Insulin dependent diabetes mellitus + Hypoglycemia: resolved hypoglycemia; transfer to ; FS with ISS. Increase Lantus to 20 units at hs.    Problem/Plan - 6:  Problem: Essential hypertension. Plan: will avoid BP meds for now due to sepsis and hypotension. Monitor closely, BP normal with out meds.     Problem/Plan - 7;  Hyponatremia of 130 sec to 3rd spacing;  133 now.     Problem/Plan - 8:  constipation: cont senna, colace, add miralax, lactulose. Resolved    Problem/Plan - 9:  Leaking Cholecystostomy Tube: IR reconsult: s/p t tube exchange with upgraded size to 10 F. monitor for any further leak.     Problem/Plan - 10:  Poor appetite: add MVI + multiminerals    Prognosis: Very Poor    Code: DNR/DNI    Dispo: would be continued on iv Zosyn for now; likely d/c Friday/ Saturday    poc discussed with pt, team; Dr. Perkins.

## 2017-07-17 NOTE — BRIEF OPERATIVE NOTE - POST-OP DX
Cholecystitis without calculus  07/12/2017    Active  Qasim Cross  Other ascites  07/12/2017    Active  Qasim Cross  Pleural effusion, right  07/13/2017    Active  Randall Holguin
Cholecystitis without calculus  07/12/2017    Active  Qasim Cross  Other ascites  07/12/2017    Active  Qasim Cross  Pleural effusion, right  07/13/2017    Active  Randall Holguin
Cholecystitis without calculus  07/12/2017    Active  Qasim Cross  Other ascites  07/12/2017    Active  Qasim Cross

## 2017-07-17 NOTE — PROGRESS NOTE ADULT - SUBJECTIVE AND OBJECTIVE BOX
HPI :Pt is a 67y old Male with hx of NIDDM, Liver CA  (Dr Baez-oncology at [place])who presents to ED after being found  unresponsive, hypothermic and hypoglycemic. 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    7/17/17 Seen and examined at bedside. States he has received 2 doses of IV Dilaudid for relief of severe abd pain over the past 24 hrs. Denies any other complaints    PAIN: (x )Yes   ( )No  Level: epigastrium   Location: abd  Intensity:   5 /10  Quality: constant dull, occasional sharp pain (with movement)  Aggravating Factors :movement  Alleviating Factors: Dilaudid  Radiation: right flank  Duration/Timing: continuous  Impact on ADLs:    DYSPNEA: ( ) Yes  ( x) No  Level:        Review of Systems:    Anxiety-no  Depression-no  Physical Discomfort-pain  Dyspnea-no  Constipation-resolved  Diarrhea-no  Nausea-no  Vomiting-no  Anorexia-no  Weight Loss- yes  Cough-no  Secretions-no  Fatigue-yes  Weakness-yes  Delirium-no    All other systems reviewed and negative  Unable to obtain/Limited due to:        PHYSICAL EXAM:      ICU Vital Signs Last 24 Hrs  T(C): 36.6 (17 Jul 2017 04:44), Max: 36.6 (17 Jul 2017 04:44)  T(F): 97.8 (17 Jul 2017 04:44), Max: 97.8 (17 Jul 2017 04:44)  HR: 110 (17 Jul 2017 04:44) (84 - 110)  BP: 142/94 (17 Jul 2017 04:44) (118/68 - 142/94)  BP(mean): --  ABP: --  ABP(mean): --  RR: 16 (17 Jul 2017 04:44) (16 - 16)  SpO2: 94% (17 Jul 2017 04:44) (94% - 96%)      PPSV2:  50 %  FAST:    General: Thin, appears comfortable  HEENT: Mucosa pink, moist   Lungs: Clear cheko to auscultation  Cardiac: RRR  GI: +BS soft, no tenderness  : voids  Ext: Cheko pedal edema  Neuro: No focal deficit      LABS:                          11.4   22.8  )-----------( 134      ( 15 Jul 2017 05:44 )             34.2         07-15    133<L>  |  98  |  27<H>  ----------------------------<  244<H>  3.8   |  27  |  1.01    Ca    8.2<L>      15 Jul 2017 05:44    TPro  5.7<L>  /  Alb  1.9<L>  /  TBili  1.3<H>  /  DBili  x   /  AST  64<H>  /  ALT  61  /  AlkPhos  898<H>  07-15    PT/INR - ( 16 Jul 2017 06:56 )   PT: 16.1 sec;   INR: 1.48 ratio           Albumin: Albumin, Serum: 1.9 g/dL (07-15 @ 05:44)      Allergies    No Known Allergies    Intolerances

## 2017-07-17 NOTE — PROGRESS NOTE ADULT - SUBJECTIVE AND OBJECTIVE BOX
HPI:  67y M PMH IDDM, Liver CA last chemo per patient was 1 week ago (Dr Baez-oncology at [place]), Smoking, Prior ETOH, presents to ED BIBEMS after being found  unresponsive and was found to be hypothermic Rectal temp 90F and hypoglycemic   Fingerstick on arrival in ED 24, received 2 amps D50, subsequent fingerstick 118, repeated 10 minutes later, 118.  Denies fevers, but states he feels chills and nausea.  Rectal temp in room 90.  Denies vomiting, diarrhea, headache, SOB or cp, however family state he was found in a "whole lot of liquid" in his bed.  Pt reports feeling confused.  Reports having decreased PO intake for several days.  Denies trauma or travel.  Patient and family are unsure of what medications he takes.  His normal routine care is through [facility].  Patient reports chronic abdominal pain for which he takes oxycontin at home, unsure of dose . Denies any new or worsening of his chronic abdominal pain     7/11: c/o abd pain, gen  FS still running low    7/12: FS still low today    7/13: Feeling better    7/14: feels better, afebrile, some appetite back  DNR/DNI   FS better, Hypoglycemia resolved    7/15: feels much better but c/o constipation on and off.  DNR/DNI  FS elevated    7/16: feels better  cholecystostomy tube leaking  FS elevated  remains afebrile and abd pain free    7/17: feels better  S/p cholecystostomy tube exchange today      PHYSICAL EXAM:    Daily     Daily     ICU Vital Signs Last 24 Hrs  T(C): 36.3 (17 Jul 2017 12:12), Max: 36.6 (17 Jul 2017 04:44)  T(F): 97.4 (17 Jul 2017 12:12), Max: 97.8 (17 Jul 2017 04:44)  HR: 97 (17 Jul 2017 12:12) (94 - 110)  BP: 115/76 (17 Jul 2017 12:12) (115/76 - 142/94)  BP(mean): --  ABP: --  ABP(mean): --  RR: 16 (17 Jul 2017 12:12) (16 - 16)  SpO2: 96% (17 Jul 2017 12:12) (94% - 96%)      Constitutional: Well appearing  HEENT: Atraumatic, FLORENCIA, Normal, No congestion  Respiratory: Breath Sounds normal, no rhonchi/wheeze  Cardiovascular: N S1S2; JOANNA present  Gastrointestinal: Abdomen soft, non tender, Bowel Sounds present  Extremities: No edema, peripheral pulses present  Neurological: AAO x 3, no gross focal motor deficits  Skin: Non cellulitic, no rash, ulcers  Lymph Nodes: No lymphadenopathy noted  Back: No CVA tenderness   Musculoskeletal: non tender  Breasts: Deferred  Genitourinary: deferred  Rectal: Deferred                        PT/INR - ( 17 Jul 2017 06:24 )   PT: 15.9 sec;   INR: 1.46 ratio                           MEDICATIONS  (STANDING):  furosemide    Tablet 20 milliGRAM(s) Oral daily  tamsulosin 0.4 milliGRAM(s) Oral at bedtime  docusate sodium 100 milliGRAM(s) Oral three times a day  piperacillin/tazobactam IVPB. 3.375 Gram(s) IV Intermittent every 8 hours  oxyCODONE  ER Tablet 30 milliGRAM(s) Oral every 8 hours  pantoprazole    Tablet 40 milliGRAM(s) Oral daily  lactulose Syrup 10 Gram(s) Oral three times a day  polyethylene glycol 3350 17 Gram(s) Oral daily  insulin lispro (HumaLOG) corrective regimen sliding scale   SubCutaneous three times a day before meals  insulin lispro (HumaLOG) corrective regimen sliding scale   SubCutaneous at bedtime  dextrose 5%. 1000 milliLiter(s) (50 mL/Hr) IV Continuous <Continuous>  dextrose 50% Injectable 12.5 Gram(s) IV Push once  dextrose 50% Injectable 25 Gram(s) IV Push once  dextrose 50% Injectable 25 Gram(s) IV Push once  multivitamin/minerals 1 Tablet(s) Oral daily  insulin glargine Injectable (LANTUS) 20 Unit(s) SubCutaneous at bedtime

## 2017-07-17 NOTE — PROGRESS NOTE ADULT - SUBJECTIVE AND OBJECTIVE BOX
Patient is a 67y old  Male who presents with a chief complaint of unresponsiveness (10 Jul 2017 22:50)    HPI:  68 y/o Male with h/o IDDM, Liver CA s/p chemo (last cycle 1 week PTA with Dr Baez-oncology at [facility]), chronic pain was admitted on 7/10 for confusion, hypoglycemia and hypothermia. He was BIBEMS after being found  unresponsive and hypothermic with rectal temp 90F. Denies fevers, but states he feels chills and nausea. He may have had diarrhea since family states he was found in a "whole lot of liquid" in his bed. Pt reports feeling confused.  Reports having decreased PO intake for several days. Patient reports chronic abdominal pain for which he takes oxycontin at home. In ER, he received vancomycin IV and cefepime.     Weak looking  No fever or chills  Abdomen feels distended  Noted with drainage around cholecystotomy tube  Denies pain    MEDICATIONS  (STANDING):  furosemide    Tablet 20 milliGRAM(s) Oral daily  tamsulosin 0.4 milliGRAM(s) Oral at bedtime  docusate sodium 100 milliGRAM(s) Oral three times a day  piperacillin/tazobactam IVPB. 3.375 Gram(s) IV Intermittent every 8 hours  oxyCODONE  ER Tablet 30 milliGRAM(s) Oral every 8 hours  pantoprazole    Tablet 40 milliGRAM(s) Oral daily  lactulose Syrup 10 Gram(s) Oral three times a day  polyethylene glycol 3350 17 Gram(s) Oral daily  insulin lispro (HumaLOG) corrective regimen sliding scale   SubCutaneous three times a day before meals  insulin lispro (HumaLOG) corrective regimen sliding scale   SubCutaneous at bedtime  dextrose 5%. 1000 milliLiter(s) (50 mL/Hr) IV Continuous <Continuous>  dextrose 50% Injectable 12.5 Gram(s) IV Push once  dextrose 50% Injectable 25 Gram(s) IV Push once  dextrose 50% Injectable 25 Gram(s) IV Push once  multivitamin/minerals 1 Tablet(s) Oral daily  insulin glargine Injectable (LANTUS) 20 Unit(s) SubCutaneous at bedtime    MEDICATIONS  (PRN):  ondansetron Injectable 4 milliGRAM(s) IV Push every 8 hours PRN Nausea  glucagon  Injectable 1 milliGRAM(s) IntraMuscular once PRN Glucose LESS THAN 70 milligrams/deciliter  oxyCODONE    IR 10 milliGRAM(s) Oral every 4 hours PRN Moderate Pain (4 - 6)  oxyCODONE    IR 15 milliGRAM(s) Oral every 4 hours PRN Severe Pain (7 - 10)  HYDROmorphone  Injectable 0.5 milliGRAM(s) IV Push every 3 hours PRN Severe Pain (7 - 10)  bisacodyl Suppository 10 milliGRAM(s) Rectal daily PRN Constipation  senna 2 Tablet(s) Oral at bedtime PRN Constipation  dextrose Gel 1 Dose(s) Oral once PRN Blood Glucose LESS THAN 70 milliGRAM(s)/deciliter      Vital Signs Last 24 Hrs  T(C): 36.6 (2017 04:44), Max: 36.6 (2017 04:44)  T(F): 97.8 (2017 04:44), Max: 97.8 (2017 04:44)  HR: 110 (2017 04:44) (84 - 110)  BP: 142/94 (2017 04:44) (118/68 - 142/94)  BP(mean): --  RR: 16 (2017 04:44) (16 - 16)  SpO2: 94% (2017 04:44) (94% - 96%)    Physical Exam:        Constitutional: frail looking  HEENT: NC/AT, EOMI, PERRLA  Neck: supple  Back: no tenderness  Respiratory: clear  Cardiovascular: S1S2 regular, no murmurs  Abdomen: soft, not tender, distended, positive BS; cholecystotomy tube in place with mild surrounding erythema and leakage  Genitourinary: deferred  Rectal: deferred  Musculoskeletal: no muscle tenderness, no joint swelling or tenderness  Extremities: no pedal edema  Neurological: AxOx3, moving all extremities, no focal deficits  Skin: no rashes    Labs:                                   11.4   22.8  )-----------( 134      ( 15 Jul 2017 05:44 )             34.2     07-15    133<L>  |  98  |  27<H>  ----------------------------<  244<H>  3.8   |  27  |  1.01    Ca    8.2<L>      15 Jul 2017 05:44    TPro  5.7<L>  /  Alb  1.9<L>  /  TBili  1.3<H>  /  DBili  x   /  AST  64<H>  /  ALT  61  /  AlkPhos  898<H>  07-15           Cultures:                         11.2   19.2  )-----------( 102      ( 2017 05:28 )             33.6     0714    131<L>  |  99  |  25<H>  ----------------------------<  156<H>  3.5   |  24  |  0.84    Ca    8.1<L>      2017 05:28    TPro  5.6<L>  /  Alb  1.9<L>  /  TBili  1.3<H>  /  DBili  x   /  AST  64<H>  /  ALT  73  /  AlkPhos  865<H>                            11.2   19.0  )-----------( 94       ( 2017 04:35 )             34.0     07    130<L>  |  96  |  20  ----------------------------<  101<H>  3.5   |  23  |  0.75    Ca    7.8<L>      2017 04:35    TPro  5.5<L>  /  Alb  1.8<L>  /  TBili  0.9  /  DBili  x   /  AST  154<H>  /  ALT  107<H>  /  AlkPhos  825<H>                              10.9   13.7  )-----------( 84       ( 2017 05:02 )             32.2     12    134<L>  |  101  |  20  ----------------------------<  86  3.4<L>   |  25  |  0.72    Ca    7.9<L>      2017 05:02    TPro  5.5<L>  /  Alb  1.8<L>  /  TBili  0.9  /  DBili  x   /  AST  154<H>  /  ALT  107<H>  /  AlkPhos  825<H>                              10.5   16.1  )-----------( 130      ( 2017 05:46 )             30.4         135  |  101  |  25<H>  ----------------------------<  106<H>  3.7   |  27  |  0.76    Ca    8.3<L>      2017 05:46    TPro  6.3  /  Alb  2.1<L>  /  TBili  0.9  /  DBili  x   /  AST  195<H>  /  ALT  110<H>  /  AlkPhos  815<H>       LIVER FUNCTIONS - ( 2017 05:46 )  Alb: 2.1 g/dL / Pro: 6.3 gm/dL / ALK PHOS: 815 U/L / ALT: 110 U/L / AST: 195 U/L / GGT: x           Urinalysis Basic - ( 10 Jul 2017 15:43 )    Color: Yellow / Appearance: Clear / S.020 / pH: x  Gluc: x / Ketone: Negative  / Bili: Negative / Urobili: 1 mg/dL   Blood: x / Protein: 30 mg/dL / Nitrite: Negative   Leuk Esterase: Trace / RBC: 3-5 /HPF / WBC 6-10   Sq Epi: x / Non Sq Epi: x / Bacteria: Many    Culture - Blood (07.10.17 @ 15:43)    Gram Stain:   Growth in anaerobic bottle: Gram Negative Rods    Specimen Source: .Blood None    Culture Results:   Growth in anaerobic bottle: Gram Negative Rods  ***Blood Panel PCR results on this specimen are available  approximately 3 hours after the Gram stain result.***  Gram stain, PCR, and/or culture results may not always  correspond due to difference in methodologies.    Culture - Blood (07.10.17 @ 15:43)    Gram Stain:   Growth in aerobic and anaerobic bottles: Gram Negative Rods    Specimen Source: .Blood None    Culture Results:   Growth in aerobic and anaerobic bottles: Gram Negative Rods  See previous culture [id number]    Culture - Body Fluid with Gram Stain (17 @ 14:39)    Gram Stain:   No polymorphonuclear cells seen per low power field  Numerous Gram Positive Cocci in chains per oil power field    Specimen Source: .Body Fluid None    Culture - Fungal, Blood (07.10.17 @ 15:59)    Specimen Source: .Blood Blood-Peripheral    Culture Results:   Raoultella planticola isolated  No fungus isolated at 1 week.    Culture - Body Fluid with Gram Stain (17 @ 14:39)    -  Cefazolin: S <=2    -  Cefazolin: S <=2    -  Ertapenem: S <=0.5    -  Ertapenem: S <=0.5    -  Erythromycin: R >4    -  Gentamicin: S <=1    -  Gentamicin: S <=1    -  Levofloxacin: S <=1    -  Levofloxacin: S <=1    -  Trimethoprim/Sulfamethoxazole: S <=0.5/9.5    -  Trimethoprim/Sulfamethoxazole: S <=0.5/9.5    -  Amikacin: S <=8    -  Amikacin: S <=8    -  Ampicillin: R >16    -  Ampicillin: S 8    -  Aztreonam: S <=4    -  Aztreonam: S <=4    -  Piperacillin/Tazobactam: S <=8    -  Piperacillin/Tazobactam: S <=8    -  Tetra/Doxy: R >8    -  Tobramycin: S <=2    -  Tobramycin: S <=2    -  Cefepime: S <=2    -  Cefepime: S <=2    -  Ceftriaxone: S <=1    -  Ceftriaxone: S <=1    -  Ciprofloxacin: S <=0.5    -  Ciprofloxacin: S <=0.5    -  Vancomycin: S 2    Gram Stain:   No polymorphonuclear cells seen per low power field  Numerous Gram Positive Cocci in chains per oil power field    -  Ampicillin: S <=2    -  Ampicillin/Sulbactam: S <=4/2    -  Ampicillin/Sulbactam: S <=4/2    -  Cefoxitin: S <=4    -  Cefoxitin: S <=4    -  Ceftazidime: S <=1    -  Ceftazidime: S <=1    -  Ciprofloxacin: S <=1    -  Imipenem: S <=1    -  Imipenem: S <=1    -  Meropenem: S <=1    -  Meropenem: S <=1    Specimen Source: .Body Fluid None    Culture Results:   Moderate Escherichia coli  Moderate Citrobacter koseri  Few Enterococcus faecalis  Moderate Alpha hemolytic strep  Rare Raoultella planticola    Organism Identification: Escherichia coli  Citrobacter koseri  Enterococcus faecalis    Organism: Citrobacter koseri    Organism: Enterococcus faecalis    Organism: Escherichia coli    Method Type: ISABELLA    Method Type: ISABELLA    Method Type: ISABELLA    Culture - Blood in AM (17 @ 05:28)    Specimen Source: .Blood None    Culture Results:   No growth to date.        Radiology:    < from: CT Abdomen and Pelvis w/ Oral Cont and w/ IV Cont (07.10.17 @ 17:47) >  Large hepatic mass involving the caudate lobe and right hepatic lobe with   tumor thrombus within the main portal vein and proximal splenic and   Culture - Urine (07.10.17 @ 15:43)    Specimen Source: .Urine None    Culture Results:   <10,000 CFU/ml Normal Urogenital lenin present    superior mesenteric vein with additional thrombus seen into the   suprahepatic IVC just proximal to the right atrium, with numerous   additional hepatic lesions in the setting of a cirrhotic liver suggesting   metastatic hepatocellular carcinoma.    Large paraesophageal and gastric varices.    Mild abdominal and pelvic ascites with suggestion of carcinomatosis.    Right pleural effusion.    Common bile duct stent visualized with left-sided biliary dilatation.        Advanced directives addressed: full resuscitation

## 2017-07-17 NOTE — BRIEF OPERATIVE NOTE - OPERATION/FINDINGS
Right simple pleural effusion, 1L cloudy yellow fluid removed
8F tube was retracted to the fundus. 10F drain now in place within the body of GB. No contrast noted to fill cystic duct or CBD.
ct guidance. trocar 8fr transhepatic approach into gallbladder. bilious drainage sent for cx. ct guidance 5fr llq access to small ascites. turbid yellow sent for analysis. pt dara both procedures well.

## 2017-07-17 NOTE — PROGRESS NOTE ADULT - ASSESSMENT
66 y/o Male with h/o IDDM, Liver CA s/p chemo (last cycle 1 week PTA with Dr Baez-oncology at [place]), chronic pain was admitted on 7/10 for confusion, hypoglycemia and hypothermia. He was BIBEMS after being found  unresponsive and hypothermic with rectal temp 90F. Denies fevers, but states he feels chills and nausea. He may have had diarrhea since family states he was found in a "whole lot of liquid" in his bed. Pt reports feeling confused.  Reports having decreased PO intake for several days. Patient reports chronic abdominal pain for which he takes oxycontin at home. In ER, he received vancomycin IV and cefepime.     1. Sepsis with roultella planticola resolving. Acute cholecystitis/cholangitis with E.coli, CIKO, RAPL, ENFA, alpha hemolytic strep. Large liver mass with portal vein, splenic vein, superior mesenteric vein and suprahepatic vein thrombosis. Hepatocellular carcinoma. Possible septic thrombus. Likely abdominal carcinomatosis. Immunocompromised host.   -leukocytosis is persistent  -s/p cefepime 2 gm IV q12h # 5  -on zosyn IV # 3  -tolerating abx well so far; no side effects noted  -f/u repeat BC x 2 are negative to date  -continue abx coverage  -prognosis is poor; palleative care evaluation appreciated  -monitor temps  -f/u CBC  -supportive care  2. Other issues: Hypoglycemia  -care per medicine

## 2017-07-17 NOTE — PROGRESS NOTE ADULT - ASSESSMENT
Pt is a 67y old Male with hx of NIDDM, Liver CA  (Dr Baez-oncology at [place])who presents to ED after being found  unresponsive, hypothermic and hypoglycemic. 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  Assessment and Plan:  1) Pain  -R/T hepatocellular Carcinoma  -R/T Acute cholecystitis   -S/P cholecystotomy drain placement 7/13  -Continue Oxycodone 10 mg PO Q4H PRN mod pain  -Continue Oxycodone 15 mg PO Q4H PRN severe pain  -Continue Dilaudid 0.5 mg IVP PRN SEVERE PAIN unrelieved by Oxycodone  -Increase Oxycontin to 40 mg PO Q8H  -In-patient hospice referral would be appropriate if pt agrees; await oncology input  2) Hepatocellular carcinoma.    - hepatocellular carcinoma with peritoneal carcinomatosis with extensive mesenteric, portal vein, IVC thrombosis  -Abd CT noted  -GI eval noted  -Vascular eval noted  -Not a candidate for intervention  -Supportive care  -Follows with onc at [place]  -Onc eval noted  Received chemo 1 week prior to adm  -Would like to follow up with [facility] Dr Baez  3) Sepsis  -R/T ? acute cholecystitis  -Percutaneous drain placed to day 7/13  -HIDA scan noted  -ID eval noted  -hypothermia resolved  -leukocytosis  -Bld C&S pos  -continue abx coverage as per ID  4) Malnutrition  -Poor PO intake  -Albumin=1.8  -Severe protein calorie  5) Constipation  -Resolved  -Cont Ducolax/Senna  -Monitor daily  6) Advance directives  -Pt with capacity  -Discussed HCP Named Oscar Lanza   -Agrees with GOC discussion with HCP  -Met with pt and HCP to discuss GOC  -DNR/DNI on chart  -Awaiting oncology eval and would pursue chemo if available  -Reviewed options including hospice care if ineligible for further treatment  -Discussed disch options and pt states he may have a friend's mobile home for temp shelter. We reviewed home hospice care  -Discussed with CONSTANTINE Pt is a 67y old Male with hx of NIDDM, Liver CA  (Dr Baez-oncology at [place])who presents to ED after being found  unresponsive, hypothermic and hypoglycemic. 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  Assessment and Plan:  1) Pain  -R/T hepatocellular Carcinoma  -R/T Acute cholecystitis   -S/P cholecystotomy drain placement 7/13  -Continue Oxycodone 10 mg PO Q4H PRN mod pain  -Continue Oxycodone 15 mg PO Q4H PRN severe pain  -Continue Dilaudid 0.5 mg IVP PRN SEVERE PAIN unrelieved by Oxycodone  -Increase Oxycontin to 30 mg PO Q8H  -In-patient hospice referral would be appropriate if pt agrees; await oncology input  2) Hepatocellular carcinoma.    - hepatocellular carcinoma with peritoneal carcinomatosis with extensive mesenteric, portal vein, IVC thrombosis  -Abd CT noted  -GI eval noted  -Vascular eval noted  -Not a candidate for intervention  -Supportive care  -Follows with onc at [place]  -Onc eval noted  Received chemo 1 week prior to adm  -Would like to follow up with [facility] Dr Baez  3) Sepsis  -R/T ? acute cholecystitis  -Percutaneous drain placed to day 7/13  -HIDA scan noted  -ID eval noted  -hypothermia resolved  -leukocytosis  -Bld C&S pos  -continue abx coverage as per ID  4) Malnutrition  -Poor PO intake  -Albumin=1.8  -Severe protein calorie  5) Constipation  -Resolved  -Cont Ducolax/Senna  -Monitor daily  6) Advance directives  -Pt with capacity  -Discussed HCP Named Oscar Lanza   -Agrees with GOC discussion with HCP  -Met with pt and HCP to discuss GOC  -DNR/DNI on chart  -Awaiting oncology eval and would pursue chemo if available  -Reviewed options including hospice care if ineligible for further treatment  -Discussed disch options and pt states he may have a friend's mobile home for temp shelter. We reviewed home hospice care  -Discussed with CONSTANTINE

## 2017-07-18 LAB
ANION GAP SERPL CALC-SCNC: 7 MMOL/L — SIGNIFICANT CHANGE UP (ref 5–17)
BUN SERPL-MCNC: 29 MG/DL — HIGH (ref 7–23)
CALCIUM SERPL-MCNC: 8.5 MG/DL — SIGNIFICANT CHANGE UP (ref 8.5–10.1)
CHLORIDE SERPL-SCNC: 97 MMOL/L — SIGNIFICANT CHANGE UP (ref 96–108)
CO2 SERPL-SCNC: 29 MMOL/L — SIGNIFICANT CHANGE UP (ref 22–31)
CREAT SERPL-MCNC: 1.19 MG/DL — SIGNIFICANT CHANGE UP (ref 0.5–1.3)
GLUCOSE SERPL-MCNC: 188 MG/DL — HIGH (ref 70–99)
HCT VFR BLD CALC: 35.4 % — LOW (ref 39–50)
HGB BLD-MCNC: 11.9 G/DL — LOW (ref 13–17)
INR BLD: 1.39 RATIO — HIGH (ref 0.88–1.16)
MCHC RBC-ENTMCNC: 32.7 PG — SIGNIFICANT CHANGE UP (ref 27–34)
MCHC RBC-ENTMCNC: 33.6 GM/DL — SIGNIFICANT CHANGE UP (ref 32–36)
MCV RBC AUTO: 97.2 FL — SIGNIFICANT CHANGE UP (ref 80–100)
PLATELET # BLD AUTO: 277 K/UL — SIGNIFICANT CHANGE UP (ref 150–400)
POTASSIUM SERPL-MCNC: 4.6 MMOL/L — SIGNIFICANT CHANGE UP (ref 3.5–5.3)
POTASSIUM SERPL-SCNC: 4.6 MMOL/L — SIGNIFICANT CHANGE UP (ref 3.5–5.3)
PROTHROM AB SERPL-ACNC: 15.1 SEC — HIGH (ref 9.8–12.7)
RBC # BLD: 3.64 M/UL — LOW (ref 4.2–5.8)
RBC # FLD: 15.5 % — HIGH (ref 10.3–14.5)
SODIUM SERPL-SCNC: 133 MMOL/L — LOW (ref 135–145)
WBC # BLD: 29.1 K/UL — HIGH (ref 3.8–10.5)
WBC # FLD AUTO: 29.1 K/UL — HIGH (ref 3.8–10.5)

## 2017-07-18 RX ADMIN — Medication 2: at 08:17

## 2017-07-18 RX ADMIN — PANTOPRAZOLE SODIUM 40 MILLIGRAM(S): 20 TABLET, DELAYED RELEASE ORAL at 11:59

## 2017-07-18 RX ADMIN — PIPERACILLIN AND TAZOBACTAM 25 GRAM(S): 4; .5 INJECTION, POWDER, LYOPHILIZED, FOR SOLUTION INTRAVENOUS at 14:45

## 2017-07-18 RX ADMIN — TAMSULOSIN HYDROCHLORIDE 0.4 MILLIGRAM(S): 0.4 CAPSULE ORAL at 22:32

## 2017-07-18 RX ADMIN — OXYCODONE HYDROCHLORIDE 30 MILLIGRAM(S): 5 TABLET ORAL at 17:06

## 2017-07-18 RX ADMIN — Medication 100 MILLIGRAM(S): at 22:30

## 2017-07-18 RX ADMIN — OXYCODONE HYDROCHLORIDE 30 MILLIGRAM(S): 5 TABLET ORAL at 14:45

## 2017-07-18 RX ADMIN — LACTULOSE 10 GRAM(S): 10 SOLUTION ORAL at 05:24

## 2017-07-18 RX ADMIN — Medication 1 TABLET(S): at 11:59

## 2017-07-18 RX ADMIN — PIPERACILLIN AND TAZOBACTAM 25 GRAM(S): 4; .5 INJECTION, POWDER, LYOPHILIZED, FOR SOLUTION INTRAVENOUS at 22:32

## 2017-07-18 RX ADMIN — Medication 20 MILLIGRAM(S): at 05:23

## 2017-07-18 RX ADMIN — OXYCODONE HYDROCHLORIDE 30 MILLIGRAM(S): 5 TABLET ORAL at 05:24

## 2017-07-18 RX ADMIN — OXYCODONE HYDROCHLORIDE 30 MILLIGRAM(S): 5 TABLET ORAL at 22:31

## 2017-07-18 RX ADMIN — Medication 2: at 17:30

## 2017-07-18 RX ADMIN — Medication 2: at 12:20

## 2017-07-18 RX ADMIN — Medication 100 MILLIGRAM(S): at 05:23

## 2017-07-18 RX ADMIN — INSULIN GLARGINE 20 UNIT(S): 100 INJECTION, SOLUTION SUBCUTANEOUS at 22:31

## 2017-07-18 RX ADMIN — PIPERACILLIN AND TAZOBACTAM 25 GRAM(S): 4; .5 INJECTION, POWDER, LYOPHILIZED, FOR SOLUTION INTRAVENOUS at 05:31

## 2017-07-18 NOTE — PROGRESS NOTE ADULT - SUBJECTIVE AND OBJECTIVE BOX
Patient is a 67y old  Male who presents with a chief complaint of unresponsiveness (10 Jul 2017 22:50)    HPI:  66 y/o Male with h/o IDDM, Liver CA s/p chemo (last cycle 1 week PTA with Dr Baez-oncology at [place]), chronic pain was admitted on 7/10 for confusion, hypoglycemia and hypothermia. He was BIBEMS after being found  unresponsive and hypothermic with rectal temp 90F. Denies fevers, but states he feels chills and nausea. He may have had diarrhea since family states he was found in a "whole lot of liquid" in his bed. Pt reports feeling confused.  Reports having decreased PO intake for several days. Patient reports chronic abdominal pain for which he takes oxycontin at home. In ER, he received vancomycin IV and cefepime.     Weak looking  No fever or chills  Abdomen feels distended  s/p cholecystotomy tube replacement  Denies pain    MEDICATIONS  (STANDING):  furosemide    Tablet 20 milliGRAM(s) Oral daily  tamsulosin 0.4 milliGRAM(s) Oral at bedtime  docusate sodium 100 milliGRAM(s) Oral three times a day  piperacillin/tazobactam IVPB. 3.375 Gram(s) IV Intermittent every 8 hours  oxyCODONE  ER Tablet 30 milliGRAM(s) Oral every 8 hours  pantoprazole    Tablet 40 milliGRAM(s) Oral daily  lactulose Syrup 10 Gram(s) Oral three times a day  polyethylene glycol 3350 17 Gram(s) Oral daily  insulin lispro (HumaLOG) corrective regimen sliding scale   SubCutaneous three times a day before meals  insulin lispro (HumaLOG) corrective regimen sliding scale   SubCutaneous at bedtime  dextrose 5%. 1000 milliLiter(s) (50 mL/Hr) IV Continuous <Continuous>  dextrose 50% Injectable 12.5 Gram(s) IV Push once  dextrose 50% Injectable 25 Gram(s) IV Push once  dextrose 50% Injectable 25 Gram(s) IV Push once  multivitamin/minerals 1 Tablet(s) Oral daily  insulin glargine Injectable (LANTUS) 20 Unit(s) SubCutaneous at bedtime    MEDICATIONS  (PRN):  ondansetron Injectable 4 milliGRAM(s) IV Push every 8 hours PRN Nausea  glucagon  Injectable 1 milliGRAM(s) IntraMuscular once PRN Glucose LESS THAN 70 milligrams/deciliter  oxyCODONE    IR 10 milliGRAM(s) Oral every 4 hours PRN Moderate Pain (4 - 6)  oxyCODONE    IR 15 milliGRAM(s) Oral every 4 hours PRN Severe Pain (7 - 10)  HYDROmorphone  Injectable 0.5 milliGRAM(s) IV Push every 3 hours PRN Severe Pain (7 - 10)  bisacodyl Suppository 10 milliGRAM(s) Rectal daily PRN Constipation  senna 2 Tablet(s) Oral at bedtime PRN Constipation  dextrose Gel 1 Dose(s) Oral once PRN Blood Glucose LESS THAN 70 milliGRAM(s)/deciliter      Vital Signs Last 24 Hrs  T(C): 36.4 (2017 04:51), Max: 36.6 (2017 20:33)  T(F): 97.5 (2017 04:51), Max: 97.8 (2017 20:33)  HR: 85 (2017 04:51) (85 - 97)  BP: 116/71 (2017 04:51) (111/61 - 116/71)  BP(mean): --  RR: 17 (2017 04:51) (16 - 17)  SpO2: 94% (2017 04:51) (94% - 97%)    Physical Exam:        Constitutional: frail looking  HEENT: NC/AT, EOMI, PERRLA  Neck: supple  Back: no tenderness  Respiratory: clear  Cardiovascular: S1S2 regular, no murmurs  Abdomen: soft, not tender, distended, positive BS; cholecystotomy tube in place with mild surrounding erythema and leakage  Genitourinary: deferred  Rectal: deferred  Musculoskeletal: no muscle tenderness, no joint swelling or tenderness  Extremities: no pedal edema  Neurological: AxOx3, moving all extremities, no focal deficits  Skin: no rashes    Labs:                                   11.4   22.8  )-----------( 134      ( 15 Jul 2017 05:44 )             34.2     07-15    133<L>  |  98  |  27<H>  ----------------------------<  244<H>  3.8   |  27  |  1.01    Ca    8.2<L>      15 Jul 2017 05:44    TPro  5.7<L>  /  Alb  1.9<L>  /  TBili  1.3<H>  /  DBili  x   /  AST  64<H>  /  ALT  61  /  AlkPhos  898<H>  07-15           Cultures:                         11.2   19.2  )-----------( 102      ( 2017 05:28 )             33.6     0714    131<L>  |  99  |  25<H>  ----------------------------<  156<H>  3.5   |  24  |  0.84    Ca    8.1<L>      2017 05:28    TPro  5.6<L>  /  Alb  1.9<L>  /  TBili  1.3<H>  /  DBili  x   /  AST  64<H>  /  ALT  73  /  AlkPhos  865<H>                            11.2   19.0  )-----------( 94       ( 2017 04:35 )             34.0     07    130<L>  |  96  |  20  ----------------------------<  101<H>  3.5   |  23  |  0.75    Ca    7.8<L>      2017 04:35    TPro  5.5<L>  /  Alb  1.8<L>  /  TBili  0.9  /  DBili  x   /  AST  154<H>  /  ALT  107<H>  /  AlkPhos  825<H>                              10.9   13.7  )-----------( 84       ( 2017 05:02 )             32.2     12    134<L>  |  101  |  20  ----------------------------<  86  3.4<L>   |  25  |  0.72    Ca    7.9<L>      2017 05:02    TPro  5.5<L>  /  Alb  1.8<L>  /  TBili  0.9  /  DBili  x   /  AST  154<H>  /  ALT  107<H>  /  AlkPhos  825<H>                              10.5   16.1  )-----------( 130      ( 2017 05:46 )             30.4         135  |  101  |  25<H>  ----------------------------<  106<H>  3.7   |  27  |  0.76    Ca    8.3<L>      2017 05:46    TPro  6.3  /  Alb  2.1<L>  /  TBili  0.9  /  DBili  x   /  AST  195<H>  /  ALT  110<H>  /  AlkPhos  815<H>       LIVER FUNCTIONS - ( 2017 05:46 )  Alb: 2.1 g/dL / Pro: 6.3 gm/dL / ALK PHOS: 815 U/L / ALT: 110 U/L / AST: 195 U/L / GGT: x           Urinalysis Basic - ( 10 Jul 2017 15:43 )    Color: Yellow / Appearance: Clear / S.020 / pH: x  Gluc: x / Ketone: Negative  / Bili: Negative / Urobili: 1 mg/dL   Blood: x / Protein: 30 mg/dL / Nitrite: Negative   Leuk Esterase: Trace / RBC: 3-5 /HPF / WBC 6-10   Sq Epi: x / Non Sq Epi: x / Bacteria: Many    Culture - Blood (07.10.17 @ 15:43)    Gram Stain:   Growth in anaerobic bottle: Gram Negative Rods    Specimen Source: .Blood None    Culture Results:   Growth in anaerobic bottle: Gram Negative Rods  ***Blood Panel PCR results on this specimen are available  approximately 3 hours after the Gram stain result.***  Gram stain, PCR, and/or culture results may not always  correspond due to difference in methodologies.    Culture - Blood (07.10.17 @ 15:43)    Gram Stain:   Growth in aerobic and anaerobic bottles: Gram Negative Rods    Specimen Source: .Blood None    Culture Results:   Growth in aerobic and anaerobic bottles: Gram Negative Rods  See previous culture [id number]    Culture - Body Fluid with Gram Stain (17 @ 14:39)    Gram Stain:   No polymorphonuclear cells seen per low power field  Numerous Gram Positive Cocci in chains per oil power field    Specimen Source: .Body Fluid None    Culture - Fungal, Blood (07.10.17 @ 15:59)    Specimen Source: .Blood Blood-Peripheral    Culture Results:   Raoultella planticola isolated  No fungus isolated at 1 week.    Culture - Body Fluid with Gram Stain (17 @ 14:39)    -  Cefazolin: S <=2    -  Cefazolin: S <=2    -  Ertapenem: S <=0.5    -  Ertapenem: S <=0.5    -  Erythromycin: R >4    -  Gentamicin: S <=1    -  Gentamicin: S <=1    -  Levofloxacin: S <=1    -  Levofloxacin: S <=1    -  Trimethoprim/Sulfamethoxazole: S <=0.5/9.5    -  Trimethoprim/Sulfamethoxazole: S <=0.5/9.5    -  Amikacin: S <=8    -  Amikacin: S <=8    -  Ampicillin: R >16    -  Ampicillin: S 8    -  Aztreonam: S <=4    -  Aztreonam: S <=4    -  Piperacillin/Tazobactam: S <=8    -  Piperacillin/Tazobactam: S <=8    -  Tetra/Doxy: R >8    -  Tobramycin: S <=2    -  Tobramycin: S <=2    -  Cefepime: S <=2    -  Cefepime: S <=2    -  Ceftriaxone: S <=1    -  Ceftriaxone: S <=1    -  Ciprofloxacin: S <=0.5    -  Ciprofloxacin: S <=0.5    -  Vancomycin: S 2    Gram Stain:   No polymorphonuclear cells seen per low power field  Numerous Gram Positive Cocci in chains per oil power field    -  Ampicillin: S <=2    -  Ampicillin/Sulbactam: S <=4/2    -  Ampicillin/Sulbactam: S <=4/2    -  Cefoxitin: S <=4    -  Cefoxitin: S <=4    -  Ceftazidime: S <=1    -  Ceftazidime: S <=1    -  Ciprofloxacin: S <=1    -  Imipenem: S <=1    -  Imipenem: S <=1    -  Meropenem: S <=1    -  Meropenem: S <=1    Specimen Source: .Body Fluid None    Culture Results:   Moderate Escherichia coli  Moderate Citrobacter koseri  Few Enterococcus faecalis  Moderate Alpha hemolytic strep  Rare Raoultella planticola    Organism Identification: Escherichia coli  Citrobacter koseri  Enterococcus faecalis    Organism: Citrobacter koseri    Organism: Enterococcus faecalis    Organism: Escherichia coli    Method Type: ISABELLA    Method Type: ISABELLA    Method Type: ISABELLA    Culture - Blood in AM (17 @ 05:28)    Specimen Source: .Blood None    Culture Results:   No growth to date.        Radiology:    < from: CT Abdomen and Pelvis w/ Oral Cont and w/ IV Cont (07.10.17 @ 17:47) >  Large hepatic mass involving the caudate lobe and right hepatic lobe with   tumor thrombus within the main portal vein and proximal splenic and   Culture - Urine (07.10.17 @ 15:43)    Specimen Source: .Urine None    Culture Results:   <10,000 CFU/ml Normal Urogenital lenin present    superior mesenteric vein with additional thrombus seen into the   suprahepatic IVC just proximal to the right atrium, with numerous   additional hepatic lesions in the setting of a cirrhotic liver suggesting   metastatic hepatocellular carcinoma.    Large paraesophageal and gastric varices.    Mild abdominal and pelvic ascites with suggestion of carcinomatosis.    Right pleural effusion.    Common bile duct stent visualized with left-sided biliary dilatation.        Advanced directives addressed: full resuscitation
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
## 2017-07-18 NOTE — PROGRESS NOTE ADULT - ASSESSMENT
66 y/o Male with h/o IDDM, Liver CA s/p chemo (last cycle 1 week PTA with Dr Baez-oncology at [place]), chronic pain was admitted on 7/10 for confusion, hypoglycemia and hypothermia. He was BIBEMS after being found  unresponsive and hypothermic with rectal temp 90F. Denies fevers, but states he feels chills and nausea. He may have had diarrhea since family states he was found in a "whole lot of liquid" in his bed. Pt reports feeling confused.  Reports having decreased PO intake for several days. Patient reports chronic abdominal pain for which he takes oxycontin at home. In ER, he received vancomycin IV and cefepime.     1. S/p sepsis with roultella planticola. Acute cholecystitis/cholangitis with E.coli, CIKO, RAPL, ENFA, alpha hemolytic strep s/p drainage. Large liver mass with portal vein, splenic vein, superior mesenteric vein and suprahepatic vein thrombosis. Hepatocellular carcinoma. Possible septic thrombus. Likely abdominal carcinomatosis. Immunocompromised host.   -leukocytosis is persistent  -s/p cefepime 2 gm IV q12h # 5  -on zosyn IV # 4  -tolerating abx well so far; no side effects noted  -repeat BC x 2 are negative to date  -continue abx coverage  -prognosis is poor; palleative care evaluation appreciated  -monitor temps  -f/u CBC  -supportive care  2. Other issues: Hypoglycemia  -care per medicine

## 2017-07-18 NOTE — CHART NOTE - NSCHARTNOTEFT_GEN_A_CORE
This SW met with pt. at bedside to follow up. Pt. reports that he has a follow up appointment scheduled for [place] to see his Oncologist there regarding if he is eligible for more treatment. Pt. was seen by Dr. Celaya Oncologist here and as per Dr. Celaya pt. was not candidate for further treatment.     Pt. would like to see Dr. Reid at [place] before making any decisions about hospice or comfort services. Pt. reports he has a friends mobile home he can stay in. This SW discussed Palliative Home Care with pt. and he declined. This SW encouraged it however, he says he is not interested. Pt. reports he has a friend who will take him to his appointment at [place].     This SW also spoke with pts friend/HCP Romel (with pts permission) regarding above plan. He is aware of this plan and also feels pt. will not be comfortable moving forward with hospice services until he sees his own Oncologist Dr. Reid at [place].     Emotional support provided. Pt. declining home care at this time. Our team will continue to follow.

## 2017-07-18 NOTE — PROGRESS NOTE ADULT - SUBJECTIVE AND OBJECTIVE BOX
HPI :Pt is a 67y old Male with hx of NIDDM, Liver CA  (Dr Baez-oncology at [place])who presents to ED after being found  unresponsive, hypothermic and hypoglycemic. 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    7/17/17 Seen and examined at bedside. States he has received 2 doses of IV Dilaudid for relief of severe abd pain over the past 24 hrs. Denies any other complaints  7/18/17 Seen and examined at bedside. OOB ambulating without C/O pain or dyspnea this morning    PAIN: (x )Yes   ( )No  Level: epigastrium   Location: abd  Intensity:   5 /10  Quality: constant dull, occasional sharp pain (with movement)  Aggravating Factors :movement  Alleviating Factors: Dilaudid  Radiation: right flank  Duration/Timing: continuous  Impact on ADLs:    DYSPNEA: ( ) Yes  ( x) No  Level:        Review of Systems:    Anxiety-no  Depression-no  Physical Discomfort-pain improved  Dyspnea-no  Constipation-resolved  Diarrhea-no  Nausea-no  Vomiting-no  Anorexia-no  Weight Loss- yes  Cough-no  Secretions-no  Fatigue-yes  Weakness-yes  Delirium-no    All other systems reviewed and negative  Unable to obtain/Limited due to:        PHYSICAL EXAM:  ICU Vital Signs Last 24 Hrs  T(C): 36.4 (18 Jul 2017 04:51), Max: 36.6 (17 Jul 2017 20:33)  T(F): 97.5 (18 Jul 2017 04:51), Max: 97.8 (17 Jul 2017 20:33)  HR: 85 (18 Jul 2017 04:51) (85 - 88)  BP: 116/71 (18 Jul 2017 04:51) (111/61 - 116/71)  BP(mean): --  ABP: --  ABP(mean): --  RR: 17 (18 Jul 2017 04:51) (17 - 17)  SpO2: 94% (18 Jul 2017 04:51) (94% - 97%)        PPSV2:  50 %  FAST:    General: Thin, appears comfortable  HEENT: Mucosa pink, moist   Lungs: Clear cheko to auscultation  Cardiac: RRR  GI: +BS soft, no tenderness  : voids  Ext: Cheko pedal edema  Neuro: No focal deficit             ICU Vital Signs Last 24 Hrs  T(C): 36.4 (18 Jul 2017 04:51), Max: 36.6 (17 Jul 2017 20:33)  T(F): 97.5 (18 Jul 2017 04:51), Max: 97.8 (17 Jul 2017 20:33)  HR: 85 (18 Jul 2017 04:51) (85 - 88)  BP: 116/71 (18 Jul 2017 04:51) (111/61 - 116/71)  BP(mean): --  ABP: --  ABP(mean): --  RR: 17 (18 Jul 2017 04:51) (17 - 17)  SpO2: 94% (18 Jul 2017 04:51) (94% - 97%)         LABS:                          11.9   29.1  )-----------( 277      ( 18 Jul 2017 11:47 )             35.4              07-18    133<L>  |  97  |  29<H>  ----------------------------<  188<H>  4.6   |  29  |  1.19    Ca    8.5      18 Jul 2017 11:47    TPro  5.7<L>  /  Alb  1.9<L>  /  TBili  1.3<H>  /  DBili  x   /  AST  64<H>  /  ALT  61  /  AlkPhos  898<H>  07-15    PT/INR - ( 16 Jul 2017 06:56 )   PT: 16.1 sec;   INR: 1.48 ratio           Albumin: Albumin, Serum: 1.9 g/dL (07-15 @ 05:44)      Allergies    No Known Allergies    Intolerances

## 2017-07-18 NOTE — PROGRESS NOTE ADULT - ASSESSMENT
Pt is a 67y old Male with hx of NIDDM, Liver CA  (Dr Baez-oncology at [place])who presents to ED after being found  unresponsive, hypothermic and hypoglycemic. 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  Assessment and Plan:  1) Pain  -R/T hepatocellular Carcinoma  -R/T Acute cholecystitis   -S/P cholecystotomy drain placement 7/13  -Continue Oxycodone 10 mg PO Q4H PRN mod pain  -Continue Oxycodone 15 mg PO Q4H PRN severe pain  -Continue Dilaudid 0.5 mg IVP PRN SEVERE PAIN unrelieved by Oxycodone  -Increase Oxycontin to 30 mg PO Q8H  -In-patient hospice referral would be appropriate if pt agrees; await oncology input  2) Hepatocellular carcinoma.    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 acute cholecystitis  -Percutaneous drain placed to day 7/13  -HIDA scan noted  -ID eval noted  -hypothermia resolved  -leukocytosis  -Bld C&S pos  -continue abx coverage as per ID  4) Malnutrition  -Poor PO intake  -Albumin=1.8  -Severe protein calorie  5) Constipation  -Resolved  -Cont Ducolax/Senna  -Monitor daily  6) Advance directives  -Pt with capacity  -Discussed HCP Named Oscar Lanza   -Agrees with GOC discussion with HCP  -Met with pt and HCP to discuss GOC  -DNR/DNI on chart  -Awaiting oncology eval and would pursue chemo if available  -Reviewed options including hospice care if ineligible for further treatment  -Discussed disch options and pt states he may have a friend's mobile home for temp shelter. We reviewed home hospice care  -Discussed with [facility]

## 2017-07-18 NOTE — PHYSICAL THERAPY INITIAL EVALUATION ADULT - PERTINENT HX OF CURRENT PROBLEM, REHAB EVAL
t is a 67y old Male with hx of NIDDM, Liver CA  (Dr Baez-oncology at [place])who presents to ED after being found  unresponsive, hypothermic and hypoglycemic. Hospital work up reveals a large liver mass with portal vein, splenic vein, superior mesenteric vein and suprahepatic vein thrombosis,  Hepatocellular carcinoma

## 2017-07-18 NOTE — PROGRESS NOTE ADULT - SUBJECTIVE AND OBJECTIVE BOX
HPI:  67y M PMH IDDM, Liver CA last chemo per patient was 1 week ago (Dr Baez-oncology at [place]), Smoking, Prior ETOH, presents to ED BIBEMS after being found  unresponsive and was found to be hypothermic Rectal temp 90F and hypoglycemic   Fingerstick on arrival in ED 24, received 2 amps D50, subsequent fingerstick 118, repeated 10 minutes later, 118.  Denies fevers, but states he feels chills and nausea.  Rectal temp in room 90.  Denies vomiting, diarrhea, headache, SOB or cp, however family state he was found in a "whole lot of liquid" in his bed.  Pt reports feeling confused.  Reports having decreased PO intake for several days.  Denies trauma or travel.  Patient and family are unsure of what medications he takes.  His normal routine care is through [facility].  Patient reports chronic abdominal pain for which he takes oxycontin at home, unsure of dose . Denies any new or worsening of his chronic abdominal pain     7/11: c/o abd pain, gen  FS still running low    7/12: FS still low today    7/13: Feeling better    7/14: feels better, afebrile, some appetite back  DNR/DNI   FS better, Hypoglycemia resolved    7/15: feels much better but c/o constipation on and off.  DNR/DNI  FS elevated    7/16: feels better  cholecystostomy tube leaking  FS elevated  remains afebrile and abd pain free    7/17: feels better  S/p cholecystostomy tube exchange today.    7/18: Status quo. Chronically weak appearing. Denies fever, chills, N, V, abd pain, CP, SOB.    REVIEW OF SYSTEMS: All other review of systems is negative unless indicated above.    Vital Signs Last 24 Hrs  T(C): 36.6 (18 Jul 2017 13:31), Max: 36.6 (17 Jul 2017 20:33)  T(F): 97.9 (18 Jul 2017 13:31), Max: 97.9 (18 Jul 2017 13:31)  HR: 89 (18 Jul 2017 13:31) (85 - 89)  BP: 110/60 (18 Jul 2017 13:31) (110/60 - 116/71)  RR: 17 (18 Jul 2017 13:31) (17 - 17)  SpO2: 96% (18 Jul 2017 13:31) (94% - 97%)    Constitutional: weak, frail, NAD.  HEENT: Atraumatic, FLORENCIA, Normal, No congestion  Respiratory: Breath Sounds normal, no rhonchi/wheeze  Cardiovascular: N S1S2; JOANNA present  Gastrointestinal: Abdomen soft, non tender, Bowel Sounds present  Extremities: No edema, peripheral pulses present  Neurological: AAO x 3, no gross focal motor deficits  Skin: Non cellulitic, no rash, ulcers  Lymph Nodes: No lymphadenopathy noted  Back: No CVA tenderness         MEDICATIONS  (STANDING):  furosemide    Tablet 20 milliGRAM(s) Oral daily  tamsulosin 0.4 milliGRAM(s) Oral at bedtime  docusate sodium 100 milliGRAM(s) Oral three times a day  piperacillin/tazobactam IVPB. 3.375 Gram(s) IV Intermittent every 8 hours  oxyCODONE  ER Tablet 30 milliGRAM(s) Oral every 8 hours  pantoprazole    Tablet 40 milliGRAM(s) Oral daily  lactulose Syrup 10 Gram(s) Oral three times a day  polyethylene glycol 3350 17 Gram(s) Oral daily  insulin lispro (HumaLOG) corrective regimen sliding scale   SubCutaneous three times a day before meals  insulin lispro (HumaLOG) corrective regimen sliding scale   SubCutaneous at bedtime  dextrose 5%. 1000 milliLiter(s) (50 mL/Hr) IV Continuous <Continuous>  dextrose 50% Injectable 12.5 Gram(s) IV Push once  dextrose 50% Injectable 25 Gram(s) IV Push once  dextrose 50% Injectable 25 Gram(s) IV Push once  multivitamin/minerals 1 Tablet(s) Oral daily  insulin glargine Injectable (LANTUS) 20 Unit(s) SubCutaneous at bedtime    MEDICATIONS  (PRN):  ondansetron Injectable 4 milliGRAM(s) IV Push every 8 hours PRN Nausea  glucagon  Injectable 1 milliGRAM(s) IntraMuscular once PRN Glucose LESS THAN 70 milligrams/deciliter  oxyCODONE    IR 10 milliGRAM(s) Oral every 4 hours PRN Moderate Pain (4 - 6)  oxyCODONE    IR 15 milliGRAM(s) Oral every 4 hours PRN Severe Pain (7 - 10)  HYDROmorphone  Injectable 0.5 milliGRAM(s) IV Push every 3 hours PRN Severe Pain (7 - 10)  bisacodyl Suppository 10 milliGRAM(s) Rectal daily PRN Constipation  senna 2 Tablet(s) Oral at bedtime PRN Constipation  dextrose Gel 1 Dose(s) Oral once PRN Blood Glucose LESS THAN 70 milliGRAM(s)/deciliter                              11.9   29.1  )-----------( 277      ( 18 Jul 2017 11:47 )             35.4     07-18    133<L>  |  97  |  29<H>  ----------------------------<  188<H>  4.6   |  29  |  1.19    Ca    8.5      18 Jul 2017 11:47      CAPILLARY BLOOD GLUCOSE  189 (18 Jul 2017 12:16)  155 (18 Jul 2017 08:16)  136 (17 Jul 2017 20:33)  177 (17 Jul 2017 16:15)          PT/INR - ( 18 Jul 2017 07:01 )   PT: 15.1 sec;   INR: 1.39 ratio           Assessment and Plan:   		  67/M admitted with     Problem/Plan - 1:  ·  Problem: Sepsis with Raoultella planticola and sec to acute cholecystitis ; s/p per cholecystostomy by IR on 7/12 and paracentesis on 7/12  -leukocytosis persistent  -change cefepime and flagyl to zosyn as per ID. cont zosyn iv as per ID.   -urine cx negative; Blood cx shows Raoultella planticola    Problem/Plan - 2:  ·  Problem: Mesenteric vein thrombosis.  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  Discussed with IR, not a candidate for thrombolysis or thrombectomy.   d/c heparin drip as is high risk for variceal bleed. Discussed with GI, Dr. Alvino Stover.   Prognosis extremely poor.      Problem/Plan - 3:  ·  Problem: Pleural effusion: STABLE.   -denies SOB.  -Right pleural effusion secondary to third spacing (hypoalbuminemia)vs metastatic s/p Right thoracentesis on 7/13 by IR; 1 liter cloudy fluid removed    Problem/Plan - 4:  ·  Problem: Hepatocellular carcinoma.   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   oncology consult appreciated: Dr. Celaya would discuss on 7/17 with pt's oncology  at [facility] if any chemo is needed.     Problem/Plan - 5:  ·  Problem: Insulin dependent diabetes mellitus + Hypoglycemia: resolved hypoglycemia  -insulin lantus  -RAISS    Problem/Plan - 6:  Problem: Essential hypertension: STABLE  -Avoid BP meds for now due to sepsis and hypotension. Monitor closely, BP normal with out meds.     Problem/Plan - 7;  Hyponatremia: Chronic  -monitor    Problem/Plan - 8:  constipation: Resolved    Problem/Plan - 9:  Leaking Cholecystostomy Tube: IR reconsult: s/p t tube exchange with upgraded size to 10 F. monitor for any further leak.     Problem/Plan - 10:  Poor appetite: add MVI + multiminerals    Prognosis: Very Poor    Code: DNR/DNI    Attending Statement:  40 minutes spent on total encounter; more than 50% of the visit was spent counseling and/or coordinating care by the attending physician.

## 2017-07-19 LAB
CULTURE RESULTS: SIGNIFICANT CHANGE UP
INR BLD: 1.39 RATIO — HIGH (ref 0.88–1.16)
PROTHROM AB SERPL-ACNC: 15.1 SEC — HIGH (ref 9.8–12.7)
SPECIMEN SOURCE: SIGNIFICANT CHANGE UP

## 2017-07-19 RX ADMIN — Medication 20 MILLIGRAM(S): at 05:19

## 2017-07-19 RX ADMIN — OXYCODONE HYDROCHLORIDE 30 MILLIGRAM(S): 5 TABLET ORAL at 05:19

## 2017-07-19 RX ADMIN — Medication 1 TABLET(S): at 11:52

## 2017-07-19 RX ADMIN — Medication 100 MILLIGRAM(S): at 21:36

## 2017-07-19 RX ADMIN — PIPERACILLIN AND TAZOBACTAM 25 GRAM(S): 4; .5 INJECTION, POWDER, LYOPHILIZED, FOR SOLUTION INTRAVENOUS at 05:19

## 2017-07-19 RX ADMIN — Medication 100 MILLIGRAM(S): at 05:18

## 2017-07-19 RX ADMIN — HYDROMORPHONE HYDROCHLORIDE 0.5 MILLIGRAM(S): 2 INJECTION INTRAMUSCULAR; INTRAVENOUS; SUBCUTANEOUS at 23:28

## 2017-07-19 RX ADMIN — INSULIN GLARGINE 20 UNIT(S): 100 INJECTION, SOLUTION SUBCUTANEOUS at 21:36

## 2017-07-19 RX ADMIN — Medication 2: at 21:36

## 2017-07-19 RX ADMIN — Medication: at 12:50

## 2017-07-19 RX ADMIN — PANTOPRAZOLE SODIUM 40 MILLIGRAM(S): 20 TABLET, DELAYED RELEASE ORAL at 11:52

## 2017-07-19 RX ADMIN — TAMSULOSIN HYDROCHLORIDE 0.4 MILLIGRAM(S): 0.4 CAPSULE ORAL at 21:36

## 2017-07-19 RX ADMIN — OXYCODONE HYDROCHLORIDE 30 MILLIGRAM(S): 5 TABLET ORAL at 22:06

## 2017-07-19 RX ADMIN — PIPERACILLIN AND TAZOBACTAM 25 GRAM(S): 4; .5 INJECTION, POWDER, LYOPHILIZED, FOR SOLUTION INTRAVENOUS at 21:36

## 2017-07-19 RX ADMIN — OXYCODONE HYDROCHLORIDE 30 MILLIGRAM(S): 5 TABLET ORAL at 13:04

## 2017-07-19 RX ADMIN — Medication 4: at 09:21

## 2017-07-19 RX ADMIN — Medication 6: at 18:05

## 2017-07-19 RX ADMIN — OXYCODONE HYDROCHLORIDE 30 MILLIGRAM(S): 5 TABLET ORAL at 21:36

## 2017-07-19 RX ADMIN — HYDROMORPHONE HYDROCHLORIDE 0.5 MILLIGRAM(S): 2 INJECTION INTRAMUSCULAR; INTRAVENOUS; SUBCUTANEOUS at 22:58

## 2017-07-19 RX ADMIN — PIPERACILLIN AND TAZOBACTAM 25 GRAM(S): 4; .5 INJECTION, POWDER, LYOPHILIZED, FOR SOLUTION INTRAVENOUS at 13:04

## 2017-07-19 NOTE — PROGRESS NOTE ADULT - SUBJECTIVE AND OBJECTIVE BOX
HPI:  67y M PMH IDDM, Liver CA last chemo per patient was 1 week ago (Dr Baez-oncology at [place]), Smoking, Prior ETOH, presents to ED BIBEMS after being found  unresponsive and was found to be hypothermic Rectal temp 90F and hypoglycemic   Fingerstick on arrival in ED 24, received 2 amps D50, subsequent fingerstick 118, repeated 10 minutes later, 118.  Denies fevers, but states he feels chills and nausea.  Rectal temp in room 90.  Denies vomiting, diarrhea, headache, SOB or cp, however family state he was found in a "whole lot of liquid" in his bed.  Pt reports feeling confused.  Reports having decreased PO intake for several days.  Denies trauma or travel.  Patient and family are unsure of what medications he takes.  His normal routine care is through [facility].  Patient reports chronic abdominal pain for which he takes oxycontin at home, unsure of dose . Denies any new or worsening of his chronic abdominal pain       7/16: feels better. cholecystostomy tube leaking. remains afebrile and abd pain free    7/17: fS/p cholecystostomy tube exchange today.    7/18: Status quo. Chronically weak appearing. Denies fever, chills, N, V, abd pain, CP, SOB.  7/19: Pt weak, jaundiced. Does not want to give up his machuca and wants to discuss potential therapy with Dr. Reid at [facility].      REVIEW OF SYSTEMS: All other review of systems is negative unless indicated above.    Vital Signs Last 24 Hrs  T(C): 36.4 (19 Jul 2017 14:04), Max: 36.4 (18 Jul 2017 20:50)  T(F): 97.5 (19 Jul 2017 14:04), Max: 97.6 (19 Jul 2017 05:25)  HR: 91 (19 Jul 2017 14:04) (91 - 98)  BP: 122/63 (19 Jul 2017 14:04) (103/56 - 148/117)  BP(mean): --  RR: 16 (19 Jul 2017 14:04) (16 - 16)  SpO2: 98% (19 Jul 2017 14:04) (98% - 99%)    Constitutional: weak, frail, NAD, jaundiced.  HEENT: Atraumatic, FLORENCIA, Normal, No congestion  Respiratory: Breath Sounds normal, no rhonchi/wheeze  Cardiovascular: N S1S2; JOANNA present  Gastrointestinal: Abdomen soft, non tender, Bowel Sounds present, cholecystectomy tube draining yellow fluid.  Extremities: No edema, peripheral pulses present  Neurological: AAO x 3, no gross focal motor deficits  Skin: Non cellulitic, no rash, ulcers  Lymph Nodes: No lymphadenopathy noted  Back: No CVA tenderness       MEDICATIONS  (STANDING):  furosemide    Tablet 20 milliGRAM(s) Oral daily  tamsulosin 0.4 milliGRAM(s) Oral at bedtime  docusate sodium 100 milliGRAM(s) Oral three times a day  piperacillin/tazobactam IVPB. 3.375 Gram(s) IV Intermittent every 8 hours  oxyCODONE  ER Tablet 30 milliGRAM(s) Oral every 8 hours  pantoprazole    Tablet 40 milliGRAM(s) Oral daily  lactulose Syrup 10 Gram(s) Oral three times a day  polyethylene glycol 3350 17 Gram(s) Oral daily  insulin lispro (HumaLOG) corrective regimen sliding scale   SubCutaneous three times a day before meals  insulin lispro (HumaLOG) corrective regimen sliding scale   SubCutaneous at bedtime  dextrose 5%. 1000 milliLiter(s) (50 mL/Hr) IV Continuous <Continuous>  dextrose 50% Injectable 12.5 Gram(s) IV Push once  dextrose 50% Injectable 25 Gram(s) IV Push once  dextrose 50% Injectable 25 Gram(s) IV Push once  multivitamin/minerals 1 Tablet(s) Oral daily  insulin glargine Injectable (LANTUS) 20 Unit(s) SubCutaneous at bedtime    MEDICATIONS  (PRN):  ondansetron Injectable 4 milliGRAM(s) IV Push every 8 hours PRN Nausea  glucagon  Injectable 1 milliGRAM(s) IntraMuscular once PRN Glucose LESS THAN 70 milligrams/deciliter  oxyCODONE    IR 10 milliGRAM(s) Oral every 4 hours PRN Moderate Pain (4 - 6)  oxyCODONE    IR 15 milliGRAM(s) Oral every 4 hours PRN Severe Pain (7 - 10)  HYDROmorphone  Injectable 0.5 milliGRAM(s) IV Push every 3 hours PRN Severe Pain (7 - 10)  bisacodyl Suppository 10 milliGRAM(s) Rectal daily PRN Constipation  senna 2 Tablet(s) Oral at bedtime PRN Constipation  dextrose Gel 1 Dose(s) Oral once PRN Blood Glucose LESS THAN 70 milliGRAM(s)/deciliter                                11.9   29.1  )-----------( 277      ( 18 Jul 2017 11:47 )             35.4     07-18    133<L>  |  97  |  29<H>  ----------------------------<  188<H>  4.6   |  29  |  1.19    Ca    8.5      18 Jul 2017 11:47      CAPILLARY BLOOD GLUCOSE  196 (19 Jul 2017 12:45)  246 (19 Jul 2017 07:50)  119 (18 Jul 2017 21:42)  163 (18 Jul 2017 16:50)          PT/INR - ( 19 Jul 2017 06:08 )   PT: 15.1 sec;   INR: 1.39 ratio          Assessment and Plan:   		  67/M admitted with     sepsis/acute cholecystitis/cholangitis with roultella planticola, E.coli, CIKO, RAPL, ENFA, alpha hemolytic strep   -s/p cholecystostomy by IR on 7/12 and paracentesis on 7/12  -s/p t-tube exchange with upgraded size to 10 F.  -leukocytosis persistent  -change cefepime and flagyl to zosyn as per ID. cont zosyn iv as per ID.   -repeat blood cultures shows no growth.    Extensive mesenteric/IVC,portal vein thrombosis likely secondary to hepatocellular CA/cirrhosis with peritoneal carcinomatosis.  -Not on AC due to bleeding risk with large varices. Discussed with GI, Dr. Alvino Stover.   -Discussed with IR, not a candidate for thrombolysis or thrombectomy.   -Prognosis extremely poor.      Pleural effusion: STABLE.   -Right pleural effusion secondary to third spacing (hypoalbuminemia) vs metastatic s/p Right thoracentesis on 7/13 by IR; 1 liter cloudy fluid removed    Hepatocellular carcinoma with peritoneal carcinomatosis and extensive mesenteric, portal vein, IVC thrombosis, ascites  LFTs elevated, hyperbilirubinemia - Poor prognosis. Palliative care on board. Oncology eval appreciated --> does not believe he is candidate for further chemo.  Pt wants second opinion with Dr. Reid at [facility] before looking into palliative/comfort care.    Insulin dependent diabetes mellitus + Hypoglycemia: resolved hypoglycemia  -insulin lantus and RAISS    Essential hypertension: STABLE  -Avoid BP meds for now due to sepsis and hypotension. Monitor closely, BP normal with out meds.   -on lasix daily for hypervolemic state.    Hyponatremia: Chronic, secondary to worsening metastatic disease.  constipation: Resolved    Poor appetite with Severe Protein Calorie Malnutrition:   -MVI, supplements. Prognosis remains very poor.    Code: DNR/DNI    Dispo:  -IV abx  -f/u AM CBC  -Pt requests outpatient follow up with oncologist Dr. Reid at [facility].    Attending Statement:  40 minutes spent on total encounter; more than 50% of the visit was spent counseling and/or coordinating care by the attending physician.

## 2017-07-19 NOTE — PROGRESS NOTE ADULT - SUBJECTIVE AND OBJECTIVE BOX
HPI :Pt is a 67y old Male with hx of NIDDM, Liver CA  (Dr Baez-oncology at [facility])who presents to ED after being found  unresponsive, hypothermic and hypoglycemic. 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    7/17/17 Seen and examined at bedside. States he has received 2 doses of IV Dilaudid for relief of severe abd pain over the past 24 hrs. Denies any other complaints  7/18/17 Seen and examined at bedside. OOB ambulating without C/O pain or dyspnea this morning  7/19/17 Seen and examined at bedside. OOB ambulating without c/o pain or dyspnea.    PAIN: (x )Yes   ( )No  Level: epigastrium   Location: abd  Intensity:   2 /10  Quality: constant dull resolved  Aggravating Factors : improved  Alleviating Factors: Oxycontin  Radiation: right flank  Duration/Timing:  Impact on ADLs:    DYSPNEA: ( ) Yes  ( x) No  Level:        Review of Systems:    Anxiety-no  Depression-no  Physical Discomfort-pain improved  Dyspnea-no  Constipation-resolved  Diarrhea-no  Nausea-no  Vomiting-no  Anorexia-no  Weight Loss- yes  Cough-no  Secretions-no  Fatigue-yes  Weakness-yes  Delirium-no    All other systems reviewed and negative  Unable to obtain/Limited due to:        PHYSICAL EXAM:    ICU Vital Signs Last 24 Hrs  T(C): 36.4 (19 Jul 2017 05:25), Max: 36.6 (18 Jul 2017 13:31)  T(F): 97.6 (19 Jul 2017 05:25), Max: 97.9 (18 Jul 2017 13:31)  HR: 98 (19 Jul 2017 05:25) (89 - 98)  BP: 103/56 (19 Jul 2017 05:25) (103/56 - 148/117)  BP(mean): --  ABP: --  ABP(mean): --  RR: 16 (19 Jul 2017 05:25) (16 - 17)  SpO2: 99% (19 Jul 2017 05:25) (96% - 99%)        PPSV2:  50 %  FAST:    General: Thin, appears comfortable  HEENT: Mucosa pink, moist   Lungs: Clear cheko to auscultation  Cardiac: RRR  GI: +BS soft, no tenderness  : voids  Ext: Cheko pedal edema  Neuro: No focal deficit              LABS:                        11.9   29.1  )-----------( 277      ( 18 Jul 2017 11:47 )             35.4                           07-18    133<L>  |  97  |  29<H>  ----------------------------<  188<H>  4.6   |  29  |  1.19    Ca    8.5      18 Jul 2017 11:47      Albumin: Albumin, Serum: 1.9 g/dL (07-15 @ 05:44)      Allergies    No Known Allergies

## 2017-07-19 NOTE — PROGRESS NOTE ADULT - SUBJECTIVE AND OBJECTIVE BOX
Patient is a 67y old  Male who presents with a chief complaint of unresponsiveness (10 Jul 2017 22:50)    HPI:  66 y/o Male with h/o IDDM, Liver CA s/p chemo (last cycle 1 week PTA with Dr Baez-oncology at [place]), chronic pain was admitted on 7/10 for confusion, hypoglycemia and hypothermia. He was BIBEMS after being found  unresponsive and hypothermic with rectal temp 90F. Denies fevers, but states he feels chills and nausea. He may have had diarrhea since family states he was found in a "whole lot of liquid" in his bed. Pt reports feeling confused.  Reports having decreased PO intake for several days. Patient reports chronic abdominal pain for which he takes oxycontin at home. In ER, he received vancomycin IV and cefepime.     Weak looking  No fever or chills  Abdomen feels distended  s/p cholecystotomy tube replacement  No diarrhea    MEDICATIONS  (STANDING):  furosemide    Tablet 20 milliGRAM(s) Oral daily  tamsulosin 0.4 milliGRAM(s) Oral at bedtime  docusate sodium 100 milliGRAM(s) Oral three times a day  piperacillin/tazobactam IVPB. 3.375 Gram(s) IV Intermittent every 8 hours  oxyCODONE  ER Tablet 30 milliGRAM(s) Oral every 8 hours  pantoprazole    Tablet 40 milliGRAM(s) Oral daily  lactulose Syrup 10 Gram(s) Oral three times a day  polyethylene glycol 3350 17 Gram(s) Oral daily  insulin lispro (HumaLOG) corrective regimen sliding scale   SubCutaneous three times a day before meals  insulin lispro (HumaLOG) corrective regimen sliding scale   SubCutaneous at bedtime  dextrose 5%. 1000 milliLiter(s) (50 mL/Hr) IV Continuous <Continuous>  dextrose 50% Injectable 12.5 Gram(s) IV Push once  dextrose 50% Injectable 25 Gram(s) IV Push once  dextrose 50% Injectable 25 Gram(s) IV Push once  multivitamin/minerals 1 Tablet(s) Oral daily  insulin glargine Injectable (LANTUS) 20 Unit(s) SubCutaneous at bedtime    MEDICATIONS  (PRN):  ondansetron Injectable 4 milliGRAM(s) IV Push every 8 hours PRN Nausea  glucagon  Injectable 1 milliGRAM(s) IntraMuscular once PRN Glucose LESS THAN 70 milligrams/deciliter  oxyCODONE    IR 10 milliGRAM(s) Oral every 4 hours PRN Moderate Pain (4 - 6)  oxyCODONE    IR 15 milliGRAM(s) Oral every 4 hours PRN Severe Pain (7 - 10)  HYDROmorphone  Injectable 0.5 milliGRAM(s) IV Push every 3 hours PRN Severe Pain (7 - 10)  bisacodyl Suppository 10 milliGRAM(s) Rectal daily PRN Constipation  senna 2 Tablet(s) Oral at bedtime PRN Constipation  dextrose Gel 1 Dose(s) Oral once PRN Blood Glucose LESS THAN 70 milliGRAM(s)/deciliter      Vital Signs Last 24 Hrs  T(C): 36.4 (2017 05:25), Max: 36.6 (2017 13:31)  T(F): 97.6 (2017 05:25), Max: 97.9 (2017 13:31)  HR: 98 (2017 05:25) (89 - 98)  BP: 103/56 (2017 05:25) (103/56 - 148/117)  BP(mean): --  RR: 16 (2017 05:25) (16 - 17)  SpO2: 99% (2017 05:25) (96% - 99%)    Physical Exam:        Constitutional: frail looking  HEENT: NC/AT, EOMI, PERRLA  Neck: supple  Back: no tenderness  Respiratory: decreased BS at bases  Cardiovascular: S1S2 regular, no murmurs  Abdomen: soft, not tender, distended, positive BS; cholecystotomy tube in place with mild surrounding erythema and leakage  Genitourinary: deferred  Rectal: deferred  Musculoskeletal: no muscle tenderness, no joint swelling or tenderness  Extremities: no pedal edema  Neurological: AxOx3, moving all extremities, no focal deficits  Skin: no rashes    Labs:                        11.9   29.1  )-----------( 277      ( 2017 11:47 )             35.4     07-18    133<L>  |  97  |  29<H>  ----------------------------<  188<H>  4.6   |  29  |  1.19    Ca    8.5      2017 11:47                          11.4   22.8  )-----------( 134      ( 15 Jul 2017 05:44 )             34.2     07-15    133<L>  |  98  |  27<H>  ----------------------------<  244<H>  3.8   |  27  |  1.01    Ca    8.2<L>      15 Jul 2017 05:44    TPro  5.7<L>  /  Alb  1.9<L>  /  TBili  1.3<H>  /  DBili  x   /  AST  64<H>  /  ALT  61  /  AlkPhos  898<H>  07-15           Cultures:                         11.2   19.2  )-----------( 102      ( 2017 05:28 )             33.6     0714    131<L>  |  99  |  25<H>  ----------------------------<  156<H>  3.5   |  24  |  0.84    Ca    8.1<L>      2017 05:28    TPro  5.6<L>  /  Alb  1.9<L>  /  TBili  1.3<H>  /  DBili  x   /  AST  64<H>  /  ALT  73  /  AlkPhos  865<H>                            11.2   19.0  )-----------( 94       ( 2017 04:35 )             34.0     07-13    130<L>  |  96  |  20  ----------------------------<  101<H>  3.5   |  23  |  0.75    Ca    7.8<L>      2017 04:35    TPro  5.5<L>  /  Alb  1.8<L>  /  TBili  0.9  /  DBili  x   /  AST  154<H>  /  ALT  107<H>  /  AlkPhos  825<H>                              10.9   13.7  )-----------( 84       ( 2017 05:02 )             32.2     07-12    134<L>  |  101  |  20  ----------------------------<  86  3.4<L>   |  25  |  0.72    Ca    7.9<L>      2017 05:02    TPro  5.5<L>  /  Alb  1.8<L>  /  TBili  0.9  /  DBili  x   /  AST  154<H>  /  ALT  107<H>  /  AlkPhos  825<H>                              10.5   16.1  )-----------( 130      ( 2017 05:46 )             30.4         135  |  101  |  25<H>  ----------------------------<  106<H>  3.7   |  27  |  0.76    Ca    8.3<L>      2017 05:46    TPro  6.3  /  Alb  2.1<L>  /  TBili  0.9  /  DBili  x   /  AST  195<H>  /  ALT  110<H>  /  AlkPhos  815<H>       LIVER FUNCTIONS - ( 2017 05:46 )  Alb: 2.1 g/dL / Pro: 6.3 gm/dL / ALK PHOS: 815 U/L / ALT: 110 U/L / AST: 195 U/L / GGT: x           Urinalysis Basic - ( 10 Jul 2017 15:43 )    Color: Yellow / Appearance: Clear / S.020 / pH: x  Gluc: x / Ketone: Negative  / Bili: Negative / Urobili: 1 mg/dL   Blood: x / Protein: 30 mg/dL / Nitrite: Negative   Leuk Esterase: Trace / RBC: 3-5 /HPF / WBC 6-10   Sq Epi: x / Non Sq Epi: x / Bacteria: Many    Culture - Blood (07.10.17 @ 15:43)    Gram Stain:   Growth in anaerobic bottle: Gram Negative Rods    Specimen Source: .Blood None    Culture Results:   Growth in anaerobic bottle: Gram Negative Rods  ***Blood Panel PCR results on this specimen are available  approximately 3 hours after the Gram stain result.***  Gram stain, PCR, and/or culture results may not always  correspond due to difference in methodologies.    Culture - Blood (07.10.17 @ 15:43)    Gram Stain:   Growth in aerobic and anaerobic bottles: Gram Negative Rods    Specimen Source: .Blood None    Culture Results:   Growth in aerobic and anaerobic bottles: Gram Negative Rods  See previous culture [id number]    Culture - Body Fluid with Gram Stain (17 @ 14:39)    Gram Stain:   No polymorphonuclear cells seen per low power field  Numerous Gram Positive Cocci in chains per oil power field    Specimen Source: .Body Fluid None    Culture - Fungal, Blood (07.10.17 @ 15:59)    Specimen Source: .Blood Blood-Peripheral    Culture Results:   Raoultella planticola isolated  No fungus isolated at 1 week.    Culture - Body Fluid with Gram Stain (17 @ 14:39)    -  Cefazolin: S <=2    -  Cefazolin: S <=2    -  Ertapenem: S <=0.5    -  Ertapenem: S <=0.5    -  Erythromycin: R >4    -  Gentamicin: S <=1    -  Gentamicin: S <=1    -  Levofloxacin: S <=1    -  Levofloxacin: S <=1    -  Trimethoprim/Sulfamethoxazole: S <=0.5/9.5    -  Trimethoprim/Sulfamethoxazole: S <=0.5/9.5    -  Amikacin: S <=8    -  Amikacin: S <=8    -  Ampicillin: R >16    -  Ampicillin: S 8    -  Aztreonam: S <=4    -  Aztreonam: S <=4    -  Piperacillin/Tazobactam: S <=8    -  Piperacillin/Tazobactam: S <=8    -  Tetra/Doxy: R >8    -  Tobramycin: S <=2    -  Tobramycin: S <=2    -  Cefepime: S <=2    -  Cefepime: S <=2    -  Ceftriaxone: S <=1    -  Ceftriaxone: S <=1    -  Ciprofloxacin: S <=0.5    -  Ciprofloxacin: S <=0.5    -  Vancomycin: S 2    Gram Stain:   No polymorphonuclear cells seen per low power field  Numerous Gram Positive Cocci in chains per oil power field    -  Ampicillin: S <=2    -  Ampicillin/Sulbactam: S <=4/2    -  Ampicillin/Sulbactam: S <=4/2    -  Cefoxitin: S <=4    -  Cefoxitin: S <=4    -  Ceftazidime: S <=1    -  Ceftazidime: S <=1    -  Ciprofloxacin: S <=1    -  Imipenem: S <=1    -  Imipenem: S <=1    -  Meropenem: S <=1    -  Meropenem: S <=1    Specimen Source: .Body Fluid None    Culture Results:   Moderate Escherichia coli  Moderate Citrobacter koseri  Few Enterococcus faecalis  Moderate Alpha hemolytic strep  Rare Raoultella planticola    Organism Identification: Escherichia coli  Citrobacter koseri  Enterococcus faecalis    Organism: Citrobacter koseri    Organism: Enterococcus faecalis    Organism: Escherichia coli    Method Type: ISABELLA    Method Type: ISABELLA    Method Type: ISABELLA    Culture - Blood in AM (17 @ 05:28)    Specimen Source: .Blood None    Culture Results:   No growth to date.        Radiology:    < from: CT Abdomen and Pelvis w/ Oral Cont and w/ IV Cont (07.10.17 @ 17:47) >  Large hepatic mass involving the caudate lobe and right hepatic lobe with   tumor thrombus within the main portal vein and proximal splenic and   Culture - Urine (07.10.17 @ 15:43)    Specimen Source: .Urine None    Culture Results:   <10,000 CFU/ml Normal Urogenital lenin present    superior mesenteric vein with additional thrombus seen into the   suprahepatic IVC just proximal to the right atrium, with numerous   additional hepatic lesions in the setting of a cirrhotic liver suggesting   metastatic hepatocellular carcinoma.    Large paraesophageal and gastric varices.    Mild abdominal and pelvic ascites with suggestion of carcinomatosis.    Right pleural effusion.    Common bile duct stent visualized with left-sided biliary dilatation.        Advanced directives addressed: full resuscitation

## 2017-07-19 NOTE — PROGRESS NOTE ADULT - ASSESSMENT
68 y/o Male with h/o IDDM, Liver CA s/p chemo (last cycle 1 week PTA with Dr Baez-oncology at [facility] [facility]), chronic pain was admitted on 7/10 for confusion, hypoglycemia and hypothermia. He was BIBEMS after being found  unresponsive and hypothermic with rectal temp 90F. Denies fevers, but states he feels chills and nausea. He may have had diarrhea since family states he was found in a "whole lot of liquid" in his bed. Pt reports feeling confused.  Reports having decreased PO intake for several days. Patient reports chronic abdominal pain for which he takes oxycontin at home. In ER, he received vancomycin IV and cefepime.     1. S/p sepsis with roultella planticola. Acute cholecystitis/cholangitis with E.coli, CIKO, RAPL, ENFA, alpha hemolytic strep s/p drainage. Large liver mass with portal vein, splenic vein, superior mesenteric vein and suprahepatic vein thrombosis. Hepatocellular carcinoma. Possible septic thrombus. Likely abdominal carcinomatosis. Immunocompromised host.   -worsening leukocytosis ?cause; no reported diarrhea or fever; leukocytosis could be related to hepatocellular carcinoma  -s/p cefepime 2 gm IV q12h # 5  -on zosyn IV # 5  -tolerating abx well so far; no side effects noted  -repeat BC x 2   -continue abx coverage  -prognosis is poor; palleative care evaluation appreciated; chemotherapy may still an option  -monitor temps  -f/u CBC  -supportive care  2. Other issues: Hypoglycemia  -care per medicine

## 2017-07-19 NOTE — PROGRESS NOTE ADULT - ASSESSMENT
Pt is a 67y old Male with hx of NIDDM, Liver CA  (Dr Baez-oncology at [place])who presents to ED after being found  unresponsive, hypothermic and hypoglycemic. 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  Assessment and Plan:  1) Pain  -R/T hepatocellular Carcinoma  -R/T Acute cholecystitis   -S/P cholecystotomy drain placement 7/13  -Continue Oxycodone 10 mg PO Q4H PRN mod pain  -Continue Oxycodone 15 mg PO Q4H PRN severe pain  -Continue Dilaudid 0.5 mg IVP PRN SEVERE PAIN unrelieved by Oxycodone  -Increase Oxycontin to 30 mg PO Q8H  -Pain well controlled  -In-patient hospice referral would be appropriate if pt agrees; await oncology input  2) Hepatocellular carcinoma.    - hepatocellular carcinoma with peritoneal carcinomatosis with extensive mesenteric, portal vein, IVC thrombosis  -Abd CT noted  -GI eval noted  -Vascular eval noted  -Not a candidate for intervention  -Supportive care  -Follows with onc at [place]  -Onc eval noted  Received chemo 1 week prior to adm  -Would like to follow up with [facility] Dr Baez. Appointment scheduled for 7/26  3) Sepsis  -R/T ? acute cholecystitis  -Percutaneous drain placed to day 7/13  -HIDA scan noted  -ID eval noted  -hypothermia resolved  -leukocytosis persists  -Bld C&S pos  -continue abx coverage as per ID  4) Malnutrition  -Poor PO intake  -Albumin=1.8  -Severe protein calorie  5) Constipation  -Resolved  -Cont Ducolax/Senna  -Monitor daily  6) Advance directives  -Pt with capacity  -Discussed HCP Named Oscar Myla   -Agrees with GOC discussion with HCP  -Met with pt and HCP to discuss GOC  -DNR/DNI on chart  -Awaiting oncology eval and would pursue chemo if available  -Reviewed options including hospice care if ineligible for further treatment  -Discussed disch options and pt states he may have a friend's mobile home for temp shelter. We reviewed home hospice care however pt would like to see Dr Reid prior to decision making  -Discussed with SW

## 2017-07-20 LAB
ANION GAP SERPL CALC-SCNC: 8 MMOL/L — SIGNIFICANT CHANGE UP (ref 5–17)
BUN SERPL-MCNC: 31 MG/DL — HIGH (ref 7–23)
CALCIUM SERPL-MCNC: 8.5 MG/DL — SIGNIFICANT CHANGE UP (ref 8.5–10.1)
CHLORIDE SERPL-SCNC: 97 MMOL/L — SIGNIFICANT CHANGE UP (ref 96–108)
CO2 SERPL-SCNC: 30 MMOL/L — SIGNIFICANT CHANGE UP (ref 22–31)
CREAT SERPL-MCNC: 0.96 MG/DL — SIGNIFICANT CHANGE UP (ref 0.5–1.3)
GLUCOSE SERPL-MCNC: 62 MG/DL — LOW (ref 70–99)
HCT VFR BLD CALC: 35.6 % — LOW (ref 39–50)
HGB BLD-MCNC: 11.8 G/DL — LOW (ref 13–17)
MCHC RBC-ENTMCNC: 32 PG — SIGNIFICANT CHANGE UP (ref 27–34)
MCHC RBC-ENTMCNC: 33.2 GM/DL — SIGNIFICANT CHANGE UP (ref 32–36)
MCV RBC AUTO: 96.3 FL — SIGNIFICANT CHANGE UP (ref 80–100)
PLATELET # BLD AUTO: 353 K/UL — SIGNIFICANT CHANGE UP (ref 150–400)
POTASSIUM SERPL-MCNC: 3.3 MMOL/L — LOW (ref 3.5–5.3)
POTASSIUM SERPL-SCNC: 3.3 MMOL/L — LOW (ref 3.5–5.3)
RBC # BLD: 3.7 M/UL — LOW (ref 4.2–5.8)
RBC # FLD: 15.9 % — HIGH (ref 10.3–14.5)
SODIUM SERPL-SCNC: 135 MMOL/L — SIGNIFICANT CHANGE UP (ref 135–145)
WBC # BLD: 36.7 K/UL — HIGH (ref 3.8–10.5)
WBC # FLD AUTO: 36.7 K/UL — HIGH (ref 3.8–10.5)

## 2017-07-20 RX ORDER — FLUCONAZOLE 150 MG/1
100 TABLET ORAL DAILY
Qty: 0 | Refills: 0 | Status: DISCONTINUED | OUTPATIENT
Start: 2017-07-20 | End: 2017-07-21

## 2017-07-20 RX ORDER — POTASSIUM CHLORIDE 20 MEQ
20 PACKET (EA) ORAL ONCE
Qty: 0 | Refills: 0 | Status: COMPLETED | OUTPATIENT
Start: 2017-07-20 | End: 2017-07-20

## 2017-07-20 RX ORDER — CIPROFLOXACIN LACTATE 400MG/40ML
500 VIAL (ML) INTRAVENOUS EVERY 12 HOURS
Qty: 0 | Refills: 0 | Status: DISCONTINUED | OUTPATIENT
Start: 2017-07-20 | End: 2017-07-21

## 2017-07-20 RX ORDER — INSULIN GLARGINE 100 [IU]/ML
10 INJECTION, SOLUTION SUBCUTANEOUS AT BEDTIME
Qty: 0 | Refills: 0 | Status: DISCONTINUED | OUTPATIENT
Start: 2017-07-20 | End: 2017-07-21

## 2017-07-20 RX ADMIN — TAMSULOSIN HYDROCHLORIDE 0.4 MILLIGRAM(S): 0.4 CAPSULE ORAL at 22:09

## 2017-07-20 RX ADMIN — OXYCODONE HYDROCHLORIDE 30 MILLIGRAM(S): 5 TABLET ORAL at 05:39

## 2017-07-20 RX ADMIN — Medication 100 MILLIGRAM(S): at 13:45

## 2017-07-20 RX ADMIN — Medication 100 MILLIGRAM(S): at 22:09

## 2017-07-20 RX ADMIN — Medication 500 MILLIGRAM(S): at 17:22

## 2017-07-20 RX ADMIN — OXYCODONE HYDROCHLORIDE 30 MILLIGRAM(S): 5 TABLET ORAL at 14:40

## 2017-07-20 RX ADMIN — OXYCODONE HYDROCHLORIDE 30 MILLIGRAM(S): 5 TABLET ORAL at 22:09

## 2017-07-20 RX ADMIN — INSULIN GLARGINE 10 UNIT(S): 100 INJECTION, SOLUTION SUBCUTANEOUS at 22:08

## 2017-07-20 RX ADMIN — Medication 100 MILLIGRAM(S): at 05:39

## 2017-07-20 RX ADMIN — OXYCODONE HYDROCHLORIDE 30 MILLIGRAM(S): 5 TABLET ORAL at 13:44

## 2017-07-20 RX ADMIN — Medication 20 MILLIGRAM(S): at 05:39

## 2017-07-20 RX ADMIN — FLUCONAZOLE 100 MILLIGRAM(S): 150 TABLET ORAL at 13:43

## 2017-07-20 RX ADMIN — PIPERACILLIN AND TAZOBACTAM 25 GRAM(S): 4; .5 INJECTION, POWDER, LYOPHILIZED, FOR SOLUTION INTRAVENOUS at 05:39

## 2017-07-20 RX ADMIN — Medication 2: at 13:42

## 2017-07-20 RX ADMIN — Medication 1 TABLET(S): at 13:42

## 2017-07-20 RX ADMIN — Medication 20 MILLIEQUIVALENT(S): at 13:44

## 2017-07-20 RX ADMIN — Medication 2: at 17:21

## 2017-07-20 RX ADMIN — OXYCODONE HYDROCHLORIDE 30 MILLIGRAM(S): 5 TABLET ORAL at 22:30

## 2017-07-20 RX ADMIN — OXYCODONE HYDROCHLORIDE 30 MILLIGRAM(S): 5 TABLET ORAL at 06:09

## 2017-07-20 RX ADMIN — PANTOPRAZOLE SODIUM 40 MILLIGRAM(S): 20 TABLET, DELAYED RELEASE ORAL at 13:43

## 2017-07-20 NOTE — PROGRESS NOTE ADULT - ASSESSMENT
68 y/o Male with h/o IDDM, Liver CA s/p chemo (last cycle 1 week PTA with Dr Baez-oncology at [place]), chronic pain was admitted on 7/10 for confusion, hypoglycemia and hypothermia. He was BIBEMS after being found  unresponsive and hypothermic with rectal temp 90F. Denies fevers, but states he feels chills and nausea. He may have had diarrhea since family states he was found in a "whole lot of liquid" in his bed. Pt reports feeling confused.  Reports having decreased PO intake for several days. Patient reports chronic abdominal pain for which he takes oxycontin at home. In ER, he received vancomycin IV and cefepime.     1. S/p sepsis with roultella planticola. Acute cholecystitis/cholangitis with E.coli, CIKO, RAPL, ENFA, alpha hemolytic strep s/p drainage resolving. Large liver mass with portal vein, splenic vein, superior mesenteric vein and suprahepatic vein thrombosis. Hepatocellular carcinoma. Likely abdominal carcinomatosis. Immunocompromised host.   -worsening leukocytosis; no reported diarrhea or fever; leukocytosis could be related to hepatocellular carcinoma  -no clinical evidence of sepsis  -s/p cefepime 2 gm IV q12h # 5  -on zosyn IV # 6  -tolerating abx well so far; no side effects noted  -change abx to cipro 500 mg PO q12h for 7 more days  -prognosis is poor; palleative care evaluation appreciated; chemotherapy may still an option  -monitor temps  -f/u CBC  -supportive care  2. Other issues: Hypoglycemia  -care per medicine

## 2017-07-20 NOTE — PROGRESS NOTE ADULT - NSHPATTENDINGPLANDISCUSS_GEN_ALL_CORE
Dr. Shah
Dr Kerr and team
Dr rucker and Team
Dr rucker and Team

## 2017-07-20 NOTE — PROGRESS NOTE ADULT - SUBJECTIVE AND OBJECTIVE BOX
HPI:  67y M PMH IDDM, Liver CA last chemo per patient was 1 week ago (Dr Baez-oncology at [place]), Smoking, Prior ETOH, presents to ED BIBEMS after being found  unresponsive and was found to be hypothermic Rectal temp 90F and hypoglycemic   Fingerstick on arrival in ED 24, received 2 amps D50, subsequent fingerstick 118, repeated 10 minutes later, 118.  Denies fevers, but states he feels chills and nausea.  Rectal temp in room 90.  Denies vomiting, diarrhea, headache, SOB or cp, however family state he was found in a "whole lot of liquid" in his bed.  Pt reports feeling confused.  Reports having decreased PO intake for several days.  Denies trauma or travel.  Patient and family are unsure of what medications he takes.  His normal routine care is through [place].  Patient reports chronic abdominal pain for which he takes oxycontin at home, unsure of dose . Denies any new or worsening of his chronic abdominal pain       7/16: feels better. cholecystostomy tube leaking. remains afebrile and abd pain free    7/17: fS/p cholecystostomy tube exchange today.    7/18: Status quo. Chronically weak appearing. Denies fever, chills, N, V, abd pain, CP, SOB.  7/19: Pt weak, jaundiced. Does not want to give up his machuca and wants to discuss potential therapy with Dr. Reid at [place] [facility].    7/20: Pt is weak, but remains same as yesterday. Discussed that he may need to get stronger before he can even consider further intervention. Pt agrees that going to a rehab might be a good idea.    REVIEW OF SYSTEMS: All other review of systems is negative unless indicated above.    Vital Signs Last 24 Hrs  T(C): 36.7 (20 Jul 2017 04:06), Max: 36.7 (20 Jul 2017 04:06)  T(F): 98 (20 Jul 2017 04:06), Max: 98 (20 Jul 2017 04:06)  HR: 114 (20 Jul 2017 04:06) (91 - 114)  BP: 105/64 (20 Jul 2017 04:06) (105/64 - 123/71)  BP(mean): --  RR: 16 (20 Jul 2017 04:06) (16 - 17)  SpO2: 94% (20 Jul 2017 04:06) (94% - 98%)    Constitutional: weak, frail, NAD, jaundiced.  HEENT: Atraumatic, FLORENCIA, Normal, No congestion  Respiratory: Breath Sounds normal, no rhonchi/wheeze  Cardiovascular: N S1S2; JOANNA present  Gastrointestinal: Abdomen soft, non tender, Bowel Sounds present, cholecystectomy tube draining yellow fluid.  Extremities: No edema, peripheral pulses present  Neurological: AAO x 3, no gross focal motor deficits  Skin: Non cellulitic, no rash, ulcers  Lymph Nodes: No lymphadenopathy noted  Back: No CVA tenderness       MEDICATIONS  (STANDING):  furosemide    Tablet 20 milliGRAM(s) Oral daily  tamsulosin 0.4 milliGRAM(s) Oral at bedtime  docusate sodium 100 milliGRAM(s) Oral three times a day  oxyCODONE  ER Tablet 30 milliGRAM(s) Oral every 8 hours  pantoprazole    Tablet 40 milliGRAM(s) Oral daily  lactulose Syrup 10 Gram(s) Oral three times a day  polyethylene glycol 3350 17 Gram(s) Oral daily  insulin lispro (HumaLOG) corrective regimen sliding scale   SubCutaneous three times a day before meals  insulin lispro (HumaLOG) corrective regimen sliding scale   SubCutaneous at bedtime  dextrose 5%. 1000 milliLiter(s) (50 mL/Hr) IV Continuous <Continuous>  dextrose 50% Injectable 12.5 Gram(s) IV Push once  dextrose 50% Injectable 25 Gram(s) IV Push once  dextrose 50% Injectable 25 Gram(s) IV Push once  multivitamin/minerals 1 Tablet(s) Oral daily  insulin glargine Injectable (LANTUS) 20 Unit(s) SubCutaneous at bedtime  ciprofloxacin     Tablet 500 milliGRAM(s) Oral every 12 hours  potassium chloride    Tablet ER 20 milliEquivalent(s) Oral once  fluconAZOLE   Tablet 100 milliGRAM(s) Oral daily    MEDICATIONS  (PRN):  ondansetron Injectable 4 milliGRAM(s) IV Push every 8 hours PRN Nausea  glucagon  Injectable 1 milliGRAM(s) IntraMuscular once PRN Glucose LESS THAN 70 milligrams/deciliter  HYDROmorphone  Injectable 0.5 milliGRAM(s) IV Push every 3 hours PRN Severe Pain (7 - 10)  bisacodyl Suppository 10 milliGRAM(s) Rectal daily PRN Constipation  senna 2 Tablet(s) Oral at bedtime PRN Constipation  dextrose Gel 1 Dose(s) Oral once PRN Blood Glucose LESS THAN 70 milliGRAM(s)/deciliter                              11.8   36.7  )-----------( 353      ( 20 Jul 2017 06:19 )             35.6     07-20    135  |  97  |  31<H>  ----------------------------<  62<L>  3.3<L>   |  30  |  0.96    Ca    8.5      20 Jul 2017 06:19      CAPILLARY BLOOD GLUCOSE  77 (20 Jul 2017 08:01)  317 (19 Jul 2017 20:41)  294 (19 Jul 2017 16:06)          PT/INR - ( 19 Jul 2017 06:08 )   PT: 15.1 sec;   INR: 1.39 ratio                         Assessment and Plan:   		  67/M admitted with     sepsis/acute cholecystitis/cholangitis with roultella planticola, E.coli, CIKO, RAPL, ENFA, alpha hemolytic strep   -s/p cholecystostomy by IR on 7/12 and paracentesis on 7/12  -s/p t-tube exchange with upgraded size to 10 F.  -leukocytosis persistent (likely secondary to underlying malignancy than active infection).  -change cefepime and flagyl to zosyn as per ID and now on oral cipro.  -repeat blood cultures shows no growth.  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  -Not on AC due to bleeding risk with large varices. Discussed with GI, Dr. Alvino Stover.   -Discussed with IR, not a candidate for thrombolysis or thrombectomy.   -Prognosis extremely poor.      Pleural effusion: STABLE.   -Right pleural effusion secondary to third spacing (hypoalbuminemia) vs metastatic s/p Right thoracentesis on 7/13 by IR; 1 liter cloudy fluid removed    Hepatocellular carcinoma with peritoneal carcinomatosis and extensive mesenteric, portal vein, IVC thrombosis, ascites  -leukocytosis, worsening, likely related to underlying disease progression.  -LFTs elevated, hyperbilirubinemia - Poor prognosis. Palliative care on board. Oncology eval appreciated --> Not candidate for further chemo.  Pt wants second opinion with Dr. Reid at [place] before looking into palliative/comfort care.  Reached out to the office, awaiting call back.  Regardless the plan will be for patient to go to a rehab to get stronger as he at this time is not a candidate for further therapy.  He is very ill, with progression of an aggressive cancer.     Insulin dependent diabetes mellitus + Hypoglycemia: resolved hypoglycemia  -insulin lantus reduced from 20u to 10u qhs (7/20) because of low numbers. Would rather pt be hyperglycemic then hypo especially given his overall poor prognosis.  -raiss    Essential hypertension: STABLE  -Avoid BP meds for now due to sepsis and hypotension. Monitor closely, BP normal with out meds.   -on lasix daily for hypervolemic state.    Hyponatremia: Chronic, secondary to worsening metastatic disease.  constipation: Resolved    Poor appetite with Severe Protein Calorie Malnutrition:   -MVI, supplements. Prognosis remains very poor.    Code: DNR/DNI    Dispo:  -will attempt to discharge to rehab tomorrow.    Attending Statement:  40 minutes spent on total encounter; more than 50% of the visit was spent counseling and/or coordinating care by the attending physician.

## 2017-07-20 NOTE — PROGRESS NOTE ADULT - SUBJECTIVE AND OBJECTIVE BOX
Patient is a 67y old  Male who presents with a chief complaint of unresponsiveness (10 Jul 2017 22:50)    HPI:  68 y/o Male with h/o IDDM, Liver CA s/p chemo (last cycle 1 week PTA with Dr Baez-oncology at [place]), chronic pain was admitted on 7/10 for confusion, hypoglycemia and hypothermia. He was BIBEMS after being found  unresponsive and hypothermic with rectal temp 90F. Denies fevers, but states he feels chills and nausea. He may have had diarrhea since family states he was found in a "whole lot of liquid" in his bed. Pt reports feeling confused.  Reports having decreased PO intake for several days. Patient reports chronic abdominal pain for which he takes oxycontin at home. In ER, he received vancomycin IV and cefepime.     Weak looking  No fever or chills  Abdomen feels distended  s/p cholecystotomy tube replacement  No diarrhea  No new complaints    MEDICATIONS  (STANDING):  furosemide    Tablet 20 milliGRAM(s) Oral daily  tamsulosin 0.4 milliGRAM(s) Oral at bedtime  docusate sodium 100 milliGRAM(s) Oral three times a day  piperacillin/tazobactam IVPB. 3.375 Gram(s) IV Intermittent every 8 hours  oxyCODONE  ER Tablet 30 milliGRAM(s) Oral every 8 hours  pantoprazole    Tablet 40 milliGRAM(s) Oral daily  lactulose Syrup 10 Gram(s) Oral three times a day  polyethylene glycol 3350 17 Gram(s) Oral daily  insulin lispro (HumaLOG) corrective regimen sliding scale   SubCutaneous three times a day before meals  insulin lispro (HumaLOG) corrective regimen sliding scale   SubCutaneous at bedtime  dextrose 5%. 1000 milliLiter(s) (50 mL/Hr) IV Continuous <Continuous>  dextrose 50% Injectable 12.5 Gram(s) IV Push once  dextrose 50% Injectable 25 Gram(s) IV Push once  dextrose 50% Injectable 25 Gram(s) IV Push once  multivitamin/minerals 1 Tablet(s) Oral daily  insulin glargine Injectable (LANTUS) 20 Unit(s) SubCutaneous at bedtime    MEDICATIONS  (PRN):  ondansetron Injectable 4 milliGRAM(s) IV Push every 8 hours PRN Nausea  glucagon  Injectable 1 milliGRAM(s) IntraMuscular once PRN Glucose LESS THAN 70 milligrams/deciliter  HYDROmorphone  Injectable 0.5 milliGRAM(s) IV Push every 3 hours PRN Severe Pain (7 - 10)  bisacodyl Suppository 10 milliGRAM(s) Rectal daily PRN Constipation  senna 2 Tablet(s) Oral at bedtime PRN Constipation  dextrose Gel 1 Dose(s) Oral once PRN Blood Glucose LESS THAN 70 milliGRAM(s)/deciliter      Vital Signs Last 24 Hrs  T(C): 36.7 (2017 04:06), Max: 36.7 (2017 04:06)  T(F): 98 (2017 04:06), Max: 98 (2017 04:06)  HR: 114 (2017 04:06) (91 - 114)  BP: 105/64 (2017 04:06) (105/64 - 123/71)  BP(mean): --  RR: 16 (2017 04:06) (16 - 17)  SpO2: 94% (2017 04:06) (94% - 98%)    Physical Exam:      Constitutional: frail looking  HEENT: NC/AT, EOMI, PERRLA  Neck: supple  Back: no tenderness  Respiratory: decreased BS at bases  Cardiovascular: S1S2 regular, no murmurs  Abdomen: soft, not tender, distended, positive BS; cholecystotomy tube in place; dressing dry  Genitourinary: deferred  Rectal: deferred  Musculoskeletal: no muscle tenderness, no joint swelling or tenderness  Extremities: no pedal edema  Neurological: AxOx3, moving all extremities, no focal deficits  Skin: no rashes    Labs:                        11.8   36.7  )-----------( 353      ( 2017 06:19 )             35.6     07-20    135  |  97  |  31<H>  ----------------------------<  62<L>  3.3<L>   |  30  |  0.96    Ca    8.5      2017 06:19             Cultures:                         11.9   29.1  )-----------( 277      ( 2017 11:47 )             35.4     07-18    133<L>  |  97  |  29<H>  ----------------------------<  188<H>  4.6   |  29  |  1.19    Ca    8.5      2017 11:47                          11.4   22.8  )-----------( 134      ( 15 Jul 2017 05:44 )             34.2     07-15    133<L>  |  98  |  27<H>  ----------------------------<  244<H>  3.8   |  27  |  1.01    Ca    8.2<L>      15 Jul 2017 05:44    TPro  5.7<L>  /  Alb  1.9<L>  /  TBili  1.3<H>  /  DBili  x   /  AST  64<H>  /  ALT  61  /  AlkPhos  898<H>  07-15           Cultures:                         11.2   19.2  )-----------( 102      ( 2017 05:28 )             33.6     07-14    131<L>  |  99  |  25<H>  ----------------------------<  156<H>  3.5   |  24  |  0.84    Ca    8.1<L>      2017 05:28    TPro  5.6<L>  /  Alb  1.9<L>  /  TBili  1.3<H>  /  DBili  x   /  AST  64<H>  /  ALT  73  /  AlkPhos  865<H>                            11.2   19.0  )-----------( 94       ( 2017 04:35 )             34.0     07-13    130<L>  |  96  |  20  ----------------------------<  101<H>  3.5   |  23  |  0.75    Ca    7.8<L>      2017 04:35    TPro  5.5<L>  /  Alb  1.8<L>  /  TBili  0.9  /  DBili  x   /  AST  154<H>  /  ALT  107<H>  /  AlkPhos  825<H>                              10.9   13.7  )-----------( 84       ( 2017 05:02 )             32.2         134<L>  |  101  |  20  ----------------------------<  86  3.4<L>   |  25  |  0.72    Ca    7.9<L>      2017 05:02    TPro  5.5<L>  /  Alb  1.8<L>  /  TBili  0.9  /  DBili  x   /  AST  154<H>  /  ALT  107<H>  /  AlkPhos  825<H>                              10.5   16.1  )-----------( 130      ( 2017 05:46 )             30.4     11    135  |  101  |  25<H>  ----------------------------<  106<H>  3.7   |  27  |  0.76    Ca    8.3<L>      2017 05:46    TPro  6.3  /  Alb  2.1<L>  /  TBili  0.9  /  DBili  x   /  AST  195<H>  /  ALT  110<H>  /  AlkPhos  815<H>       LIVER FUNCTIONS - ( 2017 05:46 )  Alb: 2.1 g/dL / Pro: 6.3 gm/dL / ALK PHOS: 815 U/L / ALT: 110 U/L / AST: 195 U/L / GGT: x           Urinalysis Basic - ( 10 Jul 2017 15:43 )    Color: Yellow / Appearance: Clear / S.020 / pH: x  Gluc: x / Ketone: Negative  / Bili: Negative / Urobili: 1 mg/dL   Blood: x / Protein: 30 mg/dL / Nitrite: Negative   Leuk Esterase: Trace / RBC: 3-5 /HPF / WBC 6-10   Sq Epi: x / Non Sq Epi: x / Bacteria: Many    Culture - Blood (07.10.17 @ 15:43)    Gram Stain:   Growth in anaerobic bottle: Gram Negative Rods    Specimen Source: .Blood None    Culture Results:   Growth in anaerobic bottle: Gram Negative Rods  ***Blood Panel PCR results on this specimen are available  approximately 3 hours after the Gram stain result.***  Gram stain, PCR, and/or culture results may not always  correspond due to difference in methodologies.    Culture - Blood (07.10.17 @ 15:43)    Gram Stain:   Growth in aerobic and anaerobic bottles: Gram Negative Rods    Specimen Source: .Blood None    Culture Results:   Growth in aerobic and anaerobic bottles: Gram Negative Rods  See previous culture [id number]    Culture - Body Fluid with Gram Stain (17 @ 14:39)    Gram Stain:   No polymorphonuclear cells seen per low power field  Numerous Gram Positive Cocci in chains per oil power field    Specimen Source: .Body Fluid None    Culture - Fungal, Blood (07.10.17 @ 15:59)    Specimen Source: .Blood Blood-Peripheral    Culture Results:   Raoultella planticola isolated  No fungus isolated at 1 week.    Culture - Body Fluid with Gram Stain (17 @ 14:39)    -  Cefazolin: S <=2    -  Cefazolin: S <=2    -  Ertapenem: S <=0.5    -  Ertapenem: S <=0.5    -  Erythromycin: R >4    -  Gentamicin: S <=1    -  Gentamicin: S <=1    -  Levofloxacin: S <=1    -  Levofloxacin: S <=1    -  Trimethoprim/Sulfamethoxazole: S <=0.5/9.5    -  Trimethoprim/Sulfamethoxazole: S <=0.5/9.5    -  Amikacin: S <=8    -  Amikacin: S <=8    -  Ampicillin: R >16    -  Ampicillin: S 8    -  Aztreonam: S <=4    -  Aztreonam: S <=4    -  Piperacillin/Tazobactam: S <=8    -  Piperacillin/Tazobactam: S <=8    -  Tetra/Doxy: R >8    -  Tobramycin: S <=2    -  Tobramycin: S <=2    -  Cefepime: S <=2    -  Cefepime: S <=2    -  Ceftriaxone: S <=1    -  Ceftriaxone: S <=1    -  Ciprofloxacin: S <=0.5    -  Ciprofloxacin: S <=0.5    -  Vancomycin: S 2    Gram Stain:   No polymorphonuclear cells seen per low power field  Numerous Gram Positive Cocci in chains per oil power field    -  Ampicillin: S <=2    -  Ampicillin/Sulbactam: S <=4/2    -  Ampicillin/Sulbactam: S <=4/2    -  Cefoxitin: S <=4    -  Cefoxitin: S <=4    -  Ceftazidime: S <=1    -  Ceftazidime: S <=1    -  Ciprofloxacin: S <=1    -  Imipenem: S <=1    -  Imipenem: S <=1    -  Meropenem: S <=1    -  Meropenem: S <=1    Specimen Source: .Body Fluid None    Culture Results:   Moderate Escherichia coli  Moderate Citrobacter koseri  Few Enterococcus faecalis  Moderate Alpha hemolytic strep  Rare Raoultella planticola    Organism Identification: Escherichia coli  Citrobacter koseri  Enterococcus faecalis    Organism: Citrobacter koseri    Organism: Enterococcus faecalis    Organism: Escherichia coli    Method Type: ISABELLA    Method Type: ISABELLA    Method Type: ISABELLA    Culture - Blood in AM (17 @ 05:28)    Specimen Source: .Blood None    Culture Results:   No growth to date.        Radiology:    < from: CT Abdomen and Pelvis w/ Oral Cont and w/ IV Cont (07.10.17 @ 17:47) >  Large hepatic mass involving the caudate lobe and right hepatic lobe with   tumor thrombus within the main portal vein and proximal splenic and   Culture - Urine (07.10.17 @ 15:43)    Specimen Source: .Urine None    Culture Results:   <10,000 CFU/ml Normal Urogenital lenin present    superior mesenteric vein with additional thrombus seen into the   suprahepatic IVC just proximal to the right atrium, with numerous   additional hepatic lesions in the setting of a cirrhotic liver suggesting   metastatic hepatocellular carcinoma.    Large paraesophageal and gastric varices.    Mild abdominal and pelvic ascites with suggestion of carcinomatosis.    Right pleural effusion.    Common bile duct stent visualized with left-sided biliary dilatation.        Advanced directives addressed: full resuscitation

## 2017-07-21 VITALS
HEART RATE: 95 BPM | TEMPERATURE: 97 F | DIASTOLIC BLOOD PRESSURE: 59 MMHG | SYSTOLIC BLOOD PRESSURE: 107 MMHG | OXYGEN SATURATION: 98 % | RESPIRATION RATE: 18 BRPM

## 2017-07-21 LAB
HCT VFR BLD CALC: 37.8 % — LOW (ref 39–50)
HGB BLD-MCNC: 12.3 G/DL — LOW (ref 13–17)
MCHC RBC-ENTMCNC: 32.2 PG — SIGNIFICANT CHANGE UP (ref 27–34)
MCHC RBC-ENTMCNC: 32.7 GM/DL — SIGNIFICANT CHANGE UP (ref 32–36)
MCV RBC AUTO: 98.5 FL — SIGNIFICANT CHANGE UP (ref 80–100)
PLATELET # BLD AUTO: 376 K/UL — SIGNIFICANT CHANGE UP (ref 150–400)
RBC # BLD: 3.83 M/UL — LOW (ref 4.2–5.8)
RBC # FLD: 15.9 % — HIGH (ref 10.3–14.5)
WBC # BLD: 40.8 K/UL — CRITICAL HIGH (ref 3.8–10.5)
WBC # FLD AUTO: 40.8 K/UL — CRITICAL HIGH (ref 3.8–10.5)

## 2017-07-21 RX ORDER — DOCUSATE SODIUM 100 MG
1 CAPSULE ORAL
Qty: 30 | Refills: 0 | OUTPATIENT
Start: 2017-07-21

## 2017-07-21 RX ORDER — OXYCODONE HYDROCHLORIDE 5 MG/1
0 TABLET ORAL
Qty: 0 | Refills: 0 | COMMUNITY

## 2017-07-21 RX ORDER — CIPROFLOXACIN LACTATE 400MG/40ML
1 VIAL (ML) INTRAVENOUS
Qty: 12 | Refills: 0 | OUTPATIENT
Start: 2017-07-21

## 2017-07-21 RX ORDER — INSULIN GLARGINE 100 [IU]/ML
0 INJECTION, SOLUTION SUBCUTANEOUS
Qty: 0 | Refills: 0 | COMMUNITY

## 2017-07-21 RX ORDER — FUROSEMIDE 40 MG
1 TABLET ORAL
Qty: 0 | Refills: 0 | COMMUNITY
Start: 2017-07-21

## 2017-07-21 RX ORDER — FLUCONAZOLE 150 MG/1
1 TABLET ORAL
Qty: 13 | Refills: 0 | OUTPATIENT
Start: 2017-07-21

## 2017-07-21 RX ORDER — LACTULOSE 10 G/15ML
10 SOLUTION ORAL
Qty: 210 | Refills: 0 | OUTPATIENT
Start: 2017-07-21

## 2017-07-21 RX ORDER — TAMSULOSIN HYDROCHLORIDE 0.4 MG/1
1 CAPSULE ORAL
Qty: 30 | Refills: 0 | OUTPATIENT
Start: 2017-07-21

## 2017-07-21 RX ORDER — PANTOPRAZOLE SODIUM 20 MG/1
1 TABLET, DELAYED RELEASE ORAL
Qty: 30 | Refills: 0 | OUTPATIENT
Start: 2017-07-21

## 2017-07-21 RX ORDER — INSULIN GLARGINE 100 [IU]/ML
10 INJECTION, SOLUTION SUBCUTANEOUS
Qty: 0 | Refills: 0 | COMMUNITY

## 2017-07-21 RX ORDER — OXYCODONE HYDROCHLORIDE 5 MG/1
1 TABLET ORAL
Qty: 21 | Refills: 0 | OUTPATIENT
Start: 2017-07-21

## 2017-07-21 RX ADMIN — Medication 20 MILLIGRAM(S): at 05:34

## 2017-07-21 RX ADMIN — Medication 1 TABLET(S): at 12:59

## 2017-07-21 RX ADMIN — Medication 100 MILLIGRAM(S): at 13:00

## 2017-07-21 RX ADMIN — PANTOPRAZOLE SODIUM 40 MILLIGRAM(S): 20 TABLET, DELAYED RELEASE ORAL at 12:59

## 2017-07-21 RX ADMIN — Medication 2: at 08:04

## 2017-07-21 RX ADMIN — OXYCODONE HYDROCHLORIDE 30 MILLIGRAM(S): 5 TABLET ORAL at 05:34

## 2017-07-21 RX ADMIN — Medication 100 MILLIGRAM(S): at 05:34

## 2017-07-21 RX ADMIN — OXYCODONE HYDROCHLORIDE 30 MILLIGRAM(S): 5 TABLET ORAL at 12:59

## 2017-07-21 RX ADMIN — FLUCONAZOLE 100 MILLIGRAM(S): 150 TABLET ORAL at 13:01

## 2017-07-21 RX ADMIN — Medication 500 MILLIGRAM(S): at 05:34

## 2017-07-21 RX ADMIN — Medication 4: at 12:58

## 2017-07-21 NOTE — DISCHARGE NOTE ADULT - MEDICATION SUMMARY - MEDICATIONS TO CHANGE
I will SWITCH the dose or number of times a day I take the medications listed below when I get home from the hospital:    Lantus 100 units/mL subcutaneous solution  --  subcutaneous

## 2017-07-21 NOTE — DISCHARGE NOTE ADULT - PATIENT PORTAL LINK FT
“You can access the [facility] Patient Portal, offered by [facility], by registering with the following website: [email or web address]”

## 2017-07-21 NOTE — DISCHARGE NOTE ADULT - CARE PLAN
Principal Discharge DX:	Hypoglycemia  Goal:	check fingersticks twice a day.  Instructions for follow-up, activity and diet:	Continue lantus, would use 10u at bedtime only.  Follow up closely with your outpatient provider.  Secondary Diagnosis:	Essential hypertension  Goal:	Stable  Instructions for follow-up, activity and diet:	Continue lasix (sent to [facility] in [place] on 110) for swelling.  Secondary Diagnosis:	Hepatocellular carcinoma  Goal:	Follow up with Dr. Reid  Instructions for follow-up, activity and diet:	Follow up with Dr. Reid next week (appointment made).  Secondary Diagnosis:	Cholecystitis, acute  Goal:	take antibiotics as prescribed.  Instructions for follow-up, activity and diet:	Continue antibiotics (Sent to your pharmacy) as prescribed. There are two that you need to take. Principal Discharge DX:	Hypoglycemia  Goal:	check fingersticks twice a day.  Instructions for follow-up, activity and diet:	Continue lantus, would use 10u at bedtime only.  Follow up closely with your outpatient provider.  Secondary Diagnosis:	Essential hypertension  Goal:	Stable  Instructions for follow-up, activity and diet:	Continue lasix (sent to [facility] in [place] on 110) for swelling.  Secondary Diagnosis:	Hepatocellular carcinoma  Goal:	Follow up with Dr. Reid  Instructions for follow-up, activity and diet:	Follow up with Dr. Reid next week (appointment made).  Secondary Diagnosis:	Cholecystitis, acute  Goal:	take antibiotics as prescribed.  Instructions for follow-up, activity and diet:	Continue antibiotics (Sent to your pharmacy) as prescribed. There are two that you need to take.

## 2017-07-21 NOTE — DISCHARGE NOTE ADULT - MEDICATION SUMMARY - MEDICATIONS TO STOP TAKING
I will STOP taking the medications listed below when I get home from the hospital:    oxyCODONE 5 mg oral capsule  --  by mouth

## 2017-07-21 NOTE — DISCHARGE NOTE ADULT - PLAN OF CARE
check fingersticks twice a day. Continue lantus, would use 10u at bedtime only.  Follow up closely with your outpatient provider. Stable Continue lasix (sent to [facility] in [place] on 110) for swelling. Follow up with Dr. Reid Follow up with Dr. Redi next week (appointment made). take antibiotics as prescribed. Continue antibiotics (Sent to your pharmacy) as prescribed. There are two that you need to take.

## 2017-07-21 NOTE — DISCHARGE NOTE ADULT - MEDICATION SUMMARY - MEDICATIONS TO TAKE
I will START or STAY ON the medications listed below when I get home from the hospital:    oxyCODONE 30 mg oral tablet, extended release  -- 1 tab(s) by mouth every 8 hours MDD:90mg  -- Indication: For Pain    tamsulosin 0.4 mg oral capsule  -- 1 cap(s) by mouth once a day (at bedtime)  -- Indication: For bph    Lantus 100 units/mL subcutaneous solution  -- 10 unit(s) subcutaneous once a day (at bedtime)  -- Indication: For diabetes    fluconazole 100 mg oral tablet  -- 1 tab(s) by mouth once a day  -- Indication: For fungal infection    furosemide 20 mg oral tablet  -- 1 tab(s) by mouth once a day  -- Indication: For Hypervolemia    docusate sodium 100 mg oral capsule  -- 1 cap(s) by mouth 3 times a day  -- Indication: For Constipation    lactulose 10 g/15 mL oral syrup  -- 10 gram(s) by mouth 3 times a day  -- Indication: For Constipation    pantoprazole 40 mg oral delayed release tablet  -- 1 tab(s) by mouth once a day  -- Indication: For gerd    ciprofloxacin 500 mg oral tablet  -- 1 tab(s) by mouth every 12 hours  -- Indication: For acute cholecystitis/cholangitis

## 2017-07-21 NOTE — DISCHARGE NOTE ADULT - HOSPITAL COURSE
67y M PMH IDDM, Liver CA last chemo per patient was 1 week ago (Dr Reid-oncology at [place]), Smoking, Prior ETOH, presents to ED BIBEMS after being found  unresponsive and was found to be hypothermic Rectal temp 90F and hypoglycemic   Fingerstick on arrival in ED 24, received 2 amps D50, subsequent fingerstick 118, repeated 10 minutes later, 118.  Denies fevers, but states he feels chills and nausea.  Rectal temp in room 90.  Denies vomiting, diarrhea, headache, SOB or cp, however family state he was found in a "whole lot of liquid" in his bed.  Pt reports feeling confused.  Reports having decreased PO intake for several days.  Denies trauma or travel.  Patient and family are unsure of what medications he takes.  His normal routine care is through [facility]. Patient reports chronic abdominal pain for which he takes oxycontin at home, unsure of dose . Denies any new or worsening of his chronic abdominal pain.  Pt was admitted for sepsis secondary to acute cholecystitis/cholangitis and placed on IV abx and a cholecystostomy tube was placed by IR with drainage.  Hospital course complicated by drainage leakage for which an exchange occured on 7/17.  Pt was followed by ID.  Cultures grew rare yeast and he was placed on antifungal therapy accordingly.  Per oncology pt with aggressive cancer and despite his very poor prognosis pt wishes to seek Dr. Reid to discuss further chemotherapy.  Pt is chronically weak, but ambulates well with PT.  He does not wish to pursue palliative or comfort measures at this time even though he would qualify.  He understands his poor prognosis but wishes to continue care with his oncologist.  He will be discharged home on abx, pain meds.  His insulin was adjusted accordingly. He is very frail and has a high likelihood of readmission given his significant disease as well as his poor social support team.

## 2017-07-25 DIAGNOSIS — R68.0 HYPOTHERMIA, NOT ASSOCIATED WITH LOW ENVIRONMENTAL TEMPERATURE: ICD-10-CM

## 2017-07-25 DIAGNOSIS — K81.0 ACUTE CHOLECYSTITIS: ICD-10-CM

## 2017-07-25 DIAGNOSIS — C48.2 MALIGNANT NEOPLASM OF PERITONEUM, UNSPECIFIED: ICD-10-CM

## 2017-07-25 DIAGNOSIS — E43 UNSPECIFIED SEVERE PROTEIN-CALORIE MALNUTRITION: ICD-10-CM

## 2017-07-25 DIAGNOSIS — E87.1 HYPO-OSMOLALITY AND HYPONATREMIA: ICD-10-CM

## 2017-07-25 DIAGNOSIS — F17.210 NICOTINE DEPENDENCE, CIGARETTES, UNCOMPLICATED: ICD-10-CM

## 2017-07-25 DIAGNOSIS — A41.9 SEPSIS, UNSPECIFIED ORGANISM: ICD-10-CM

## 2017-07-25 DIAGNOSIS — E11.9 TYPE 2 DIABETES MELLITUS WITHOUT COMPLICATIONS: ICD-10-CM

## 2017-07-25 DIAGNOSIS — B96.89 OTHER SPECIFIED BACTERIAL AGENTS AS THE CAUSE OF DISEASES CLASSIFIED ELSEWHERE: ICD-10-CM

## 2017-07-25 DIAGNOSIS — K55.029 ACUTE INFARCTION OF SMALL INTESTINE, EXTENT UNSPECIFIED: ICD-10-CM

## 2017-07-25 DIAGNOSIS — K74.60 UNSPECIFIED CIRRHOSIS OF LIVER: ICD-10-CM

## 2017-07-25 DIAGNOSIS — Z72.89 OTHER PROBLEMS RELATED TO LIFESTYLE: ICD-10-CM

## 2017-07-25 DIAGNOSIS — R18.8 OTHER ASCITES: ICD-10-CM

## 2017-07-25 DIAGNOSIS — G93.41 METABOLIC ENCEPHALOPATHY: ICD-10-CM

## 2017-07-25 DIAGNOSIS — E16.2 HYPOGLYCEMIA, UNSPECIFIED: ICD-10-CM

## 2017-07-25 DIAGNOSIS — K83.0 CHOLANGITIS: ICD-10-CM

## 2017-07-25 DIAGNOSIS — J90 PLEURAL EFFUSION, NOT ELSEWHERE CLASSIFIED: ICD-10-CM

## 2017-07-25 DIAGNOSIS — I81 PORTAL VEIN THROMBOSIS: ICD-10-CM

## 2017-07-25 DIAGNOSIS — Z79.4 LONG TERM (CURRENT) USE OF INSULIN: ICD-10-CM

## 2017-07-25 DIAGNOSIS — C22.0 LIVER CELL CARCINOMA: ICD-10-CM

## 2017-07-25 DIAGNOSIS — R10.9 UNSPECIFIED ABDOMINAL PAIN: ICD-10-CM

## 2017-08-09 LAB
CULTURE RESULTS: SIGNIFICANT CHANGE UP
SPECIMEN SOURCE: SIGNIFICANT CHANGE UP

## 2017-08-10 LAB
CULTURE RESULTS: SIGNIFICANT CHANGE UP
SPECIMEN SOURCE: SIGNIFICANT CHANGE UP

## 2017-08-12 LAB
CULTURE RESULTS: SIGNIFICANT CHANGE UP
SPECIMEN SOURCE: SIGNIFICANT CHANGE UP

## 2017-09-02 LAB
CULTURE RESULTS: SIGNIFICANT CHANGE UP
CULTURE RESULTS: SIGNIFICANT CHANGE UP
SPECIMEN SOURCE: SIGNIFICANT CHANGE UP
SPECIMEN SOURCE: SIGNIFICANT CHANGE UP

## 2021-04-30 NOTE — CONSULT NOTE ADULT - SUBJECTIVE AND OBJECTIVE BOX
Reason For Visit:   Chief Complaint   Patient presents with     Surgical Followup     DOS 4/12/21 ARTHROPLASTY, HIP, TOTAL (Right)  Dr. Morillo       There were no vitals taken for this visit.    Pain Assessment  Patient Currently in Pain: Yes  0-10 Pain Scale: 3  Primary Pain Location: Hip    Radha Ejo, REJIN   Patient is a 67y old  Male who presents with a chief complaint of unresponsiveness (10 Jul 2017 22:50)    HPI:  66 y/o Male with h/o IDDM, Liver CA s/p chemo (last cycle 1 week PTA with Dr Baez-oncology at [place]), chronic pain was admitted on 7/10 for confusion, hypoglycemia and hypothermia. He was BIBEMS after being found  unresponsive and hypothermic with rectal temp 90F. Denies fevers, but states he feels chills and nausea. He may have had diarrhea since family states he was found in a "whole lot of liquid" in his bed. Pt reports feeling confused.  Reports having decreased PO intake for several days. Patient reports chronic abdominal pain for which he takes oxycontin at home. In ER, he received vancomycin IV and cefepime.     Reported with bacteremia      PMH: as above  PSH: as above  Meds: per reconciliation sheet, noted below  MEDICATIONS  (STANDING):  cefepime  IVPB 2000 milliGRAM(s) IV Intermittent every 8 hours  insulin lispro (HumaLOG) corrective regimen sliding scale   SubCutaneous three times a day before meals  dextrose 5%. 1000 milliLiter(s) (50 mL/Hr) IV Continuous <Continuous>  dextrose 50% Injectable 12.5 Gram(s) IV Push once  dextrose 50% Injectable 25 Gram(s) IV Push once  dextrose 50% Injectable 25 Gram(s) IV Push once  pantoprazole  Injectable 40 milliGRAM(s) IV Push daily  dextrose 10% + sodium chloride 0.9%. 1000 milliLiter(s) (60 mL/Hr) IV Continuous <Continuous>    MEDICATIONS  (PRN):  ondansetron Injectable 4 milliGRAM(s) IV Push every 8 hours PRN Nausea  dextrose Gel 1 Dose(s) Oral once PRN Blood Glucose LESS THAN 70 milliGRAM(s)/deciliter  glucagon  Injectable 1 milliGRAM(s) IntraMuscular once PRN Glucose LESS THAN 70 milligrams/deciliter    Allergies    No Known Allergies    Intolerances      Social: smoking, using alcohol, no illegal drugs; no recent travel, no exposure to TB  FAMILY HISTORY:  No pertinent family history in first degree relatives    ROS: the patient denies fever, no chills, no HA, no dizziness, no sore throat, no blurry vision, no CP, no palpitations, no SOB, no cough, no abdominal pain, no diarrhea, no N/V, no dysuria, no leg pain, no claudication, no rash, no joint aches, no rectal pain or bleeding, no night sweats    Vital Signs Last 24 Hrs  T(C): 36.6 (2017 05:05), Max: 36.8 (10 Jul 2017 19:57)  T(F): 97.8 (2017 05:05), Max: 98.3 (10 Jul 2017 19:57)  HR: 88 (2017 05:05) (81 - 99)  BP: 141/75 (2017 05:05) (121/70 - 195/101)  BP(mean): --  RR: 17 (2017 05:05) (17 - 22)  SpO2: 98% (2017 05:05) (98% - 100%)  Daily Height in cm: 167.64 (10 Jul 2017 15:12)    Daily     PE:    Constitutional: frail looking  HEENT: NC/AT, EOMI, PERRLA  Neck: supple  Back: no tenderness  Respiratory: clear  Cardiovascular: S1S2 regular, no murmurs  Abdomen: soft, not tender, not distended, positive BS  Genitourinary: deferred  Rectal: deferred  Musculoskeletal: no muscle tenderness, no joint swelling or tenderness  Extremities: no pedal edema  Neurological: AxOx2, moving all extremities, no focal deficits  Skin: no rashes    Labs:                        10.5   16.1  )-----------( 130      ( 2017 05:46 )             30.4     07-11    135  |  101  |  25<H>  ----------------------------<  106<H>  3.7   |  27  |  0.76    Ca    8.3<L>      2017 05:46    TPro  6.3  /  Alb  2.1<L>  /  TBili  0.9  /  DBili  x   /  AST  195<H>  /  ALT  110<H>  /  AlkPhos  815<H>       LIVER FUNCTIONS - ( 2017 05:46 )  Alb: 2.1 g/dL / Pro: 6.3 gm/dL / ALK PHOS: 815 U/L / ALT: 110 U/L / AST: 195 U/L / GGT: x           Urinalysis Basic - ( 10 Jul 2017 15:43 )    Color: Yellow / Appearance: Clear / S.020 / pH: x  Gluc: x / Ketone: Negative  / Bili: Negative / Urobili: 1 mg/dL   Blood: x / Protein: 30 mg/dL / Nitrite: Negative   Leuk Esterase: Trace / RBC: 3-5 /HPF / WBC 6-10   Sq Epi: x / Non Sq Epi: x / Bacteria: Many    Culture - Blood (07.10.17 @ 15:43)    Gram Stain:   Growth in anaerobic bottle: Gram Negative Rods    Specimen Source: .Blood None    Culture Results:   Growth in anaerobic bottle: Gram Negative Rods  ***Blood Panel PCR results on this specimen are available  approximately 3 hours after the Gram stain result.***  Gram stain, PCR, and/or culture results may not always  correspond due to difference in methodologies.          Radiology:    < from: CT Abdomen and Pelvis w/ Oral Cont and w/ IV Cont (07.10.17 @ 17:47) >  Large hepatic mass involving the caudate lobe and right hepatic lobe with   tumor thrombus within the main portal vein and proximal splenic and   superior mesenteric vein with additional thrombus seen into the   suprahepatic IVC just proximal to the right atrium, with numerous   additional hepatic lesions in the setting of a cirrhotic liver suggesting   metastatic hepatocellular carcinoma.    Large paraesophageal and gastric varices.    Mild abdominal and pelvic ascites with suggestion of carcinomatosis.    Right pleural effusion.    Common bile duct stent visualized with left-sided biliary dilatation.        Advanced directives addressed: full resuscitation Patient is a 67y old  Male who presents with a chief complaint of unresponsiveness (10 Jul 2017 22:50)    HPI:  68 y/o Male with h/o IDDM, Liver CA s/p chemo (last cycle 1 week PTA with Dr Baez-oncology at [place]), chronic pain was admitted on 7/10 for confusion, hypoglycemia and hypothermia. He was BIBEMS after being found  unresponsive and hypothermic with rectal temp 90F. Denies fevers, but states he feels chills and nausea. He may have had diarrhea since family states he was found in a "whole lot of liquid" in his bed. Pt reports feeling confused.  Reports having decreased PO intake for several days. Patient reports chronic abdominal pain for which he takes oxycontin at home. In ER, he received vancomycin IV and cefepime.     Reported with bacteremia. Chills are improved. No diarrhea reported since admission.      PMH: as above  PSH: as above  Meds: per reconciliation sheet, noted below  MEDICATIONS  (STANDING):  cefepime  IVPB 2000 milliGRAM(s) IV Intermittent every 8 hours  insulin lispro (HumaLOG) corrective regimen sliding scale   SubCutaneous three times a day before meals  dextrose 5%. 1000 milliLiter(s) (50 mL/Hr) IV Continuous <Continuous>  dextrose 50% Injectable 12.5 Gram(s) IV Push once  dextrose 50% Injectable 25 Gram(s) IV Push once  dextrose 50% Injectable 25 Gram(s) IV Push once  pantoprazole  Injectable 40 milliGRAM(s) IV Push daily  dextrose 10% + sodium chloride 0.9%. 1000 milliLiter(s) (60 mL/Hr) IV Continuous <Continuous>    MEDICATIONS  (PRN):  ondansetron Injectable 4 milliGRAM(s) IV Push every 8 hours PRN Nausea  dextrose Gel 1 Dose(s) Oral once PRN Blood Glucose LESS THAN 70 milliGRAM(s)/deciliter  glucagon  Injectable 1 milliGRAM(s) IntraMuscular once PRN Glucose LESS THAN 70 milligrams/deciliter    Allergies    No Known Allergies    Intolerances      Social: smoking, using alcohol, no illegal drugs; no recent travel, no exposure to TB  FAMILY HISTORY:  No pertinent family history in first degree relatives    ROS: the patient denies fever, no chills, no HA, no dizziness, no sore throat, no blurry vision, no CP, no palpitations, no SOB, no cough, no abdominal pain, no diarrhea, no N/V, no dysuria, no leg pain, no claudication, no rash, no joint aches, no rectal pain or bleeding, no night sweats    Vital Signs Last 24 Hrs  T(C): 36.6 (2017 05:05), Max: 36.8 (10 Jul 2017 19:57)  T(F): 97.8 (2017 05:05), Max: 98.3 (10 Jul 2017 19:57)  HR: 88 (2017 05:05) (81 - 99)  BP: 141/75 (2017 05:05) (121/70 - 195/101)  BP(mean): --  RR: 17 (2017 05:05) (17 - 22)  SpO2: 98% (2017 05:05) (98% - 100%)  Daily Height in cm: 167.64 (10 Jul 2017 15:12)    Daily     PE:    Constitutional: frail looking  HEENT: NC/AT, EOMI, PERRLA  Neck: supple  Back: no tenderness  Respiratory: clear  Cardiovascular: S1S2 regular, no murmurs  Abdomen: soft, not tender, not distended, positive BS  Genitourinary: deferred  Rectal: deferred  Musculoskeletal: no muscle tenderness, no joint swelling or tenderness  Extremities: no pedal edema  Neurological: AxOx2, moving all extremities, no focal deficits  Skin: no rashes    Labs:                        10.5   16.1  )-----------( 130      ( 2017 05:46 )             30.4     -11    135  |  101  |  25<H>  ----------------------------<  106<H>  3.7   |  27  |  0.76    Ca    8.3<L>      2017 05:46    TPro  6.3  /  Alb  2.1<L>  /  TBili  0.9  /  DBili  x   /  AST  195<H>  /  ALT  110<H>  /  AlkPhos  815<H>       LIVER FUNCTIONS - ( 2017 05:46 )  Alb: 2.1 g/dL / Pro: 6.3 gm/dL / ALK PHOS: 815 U/L / ALT: 110 U/L / AST: 195 U/L / GGT: x           Urinalysis Basic - ( 10 Jul 2017 15:43 )    Color: Yellow / Appearance: Clear / S.020 / pH: x  Gluc: x / Ketone: Negative  / Bili: Negative / Urobili: 1 mg/dL   Blood: x / Protein: 30 mg/dL / Nitrite: Negative   Leuk Esterase: Trace / RBC: 3-5 /HPF / WBC 6-10   Sq Epi: x / Non Sq Epi: x / Bacteria: Many    Culture - Blood (07.10.17 @ 15:43)    Gram Stain:   Growth in anaerobic bottle: Gram Negative Rods    Specimen Source: .Blood None    Culture Results:   Growth in anaerobic bottle: Gram Negative Rods  ***Blood Panel PCR results on this specimen are available  approximately 3 hours after the Gram stain result.***  Gram stain, PCR, and/or culture results may not always  correspond due to difference in methodologies.          Radiology:    < from: CT Abdomen and Pelvis w/ Oral Cont and w/ IV Cont (07.10.17 @ 17:47) >  Large hepatic mass involving the caudate lobe and right hepatic lobe with   tumor thrombus within the main portal vein and proximal splenic and   superior mesenteric vein with additional thrombus seen into the   suprahepatic IVC just proximal to the right atrium, with numerous   additional hepatic lesions in the setting of a cirrhotic liver suggesting   metastatic hepatocellular carcinoma.    Large paraesophageal and gastric varices.    Mild abdominal and pelvic ascites with suggestion of carcinomatosis.    Right pleural effusion.    Common bile duct stent visualized with left-sided biliary dilatation.        Advanced directives addressed: full resuscitation

## 2022-12-21 NOTE — PROGRESS NOTE ADULT - ASSESSMENT
67/M admitted with     Problem/Plan - 1:  ·  Problem: Sepsis.  Plan: Sepsis with Raoultella planticola and sec to acute cholecystitis ; s/p per cholecystostomy by IR on 7/12 and paracentesis on 7/12  Admitted to med surg  change cefepime and flagyl to zosyn as per ID.   urine cx negative; Blood cx shows Raoultella planticola    Problem/Plan - 2:  ·  Problem: Mesenteric vein thrombosis.  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  Discussed with IR, not a candidate for thrombolysis or thrombectomy.   d/c heparin drip as is high risk for variceal bleed and d/t thrombocytopenia. Discussed with GI, Dr. Alvino Stover.   Prognosis extremely poor.      Problem/Plan - 3:  ·  Problem: Pleural effusion.  Plan: Right pleural effusion secondary to third spacing (hypoalbuminemia)vs metastatic   s/p Right thoracentesis on 7/13 by IR; 1 liter cloudy fluid removed    Problem/Plan - 4:  ·  Problem: Hepatocellular carcinoma.  Plan: hepatocellullar carcinoma with peritoneal carcinomatosis with extensive mesenteric, portal vein, IVC thrombosis, ascites  Poor prognosis  #palliative consult.   Awaiting oncology consult    Problem/Plan - 5:  ·  Problem: Insulin dependent diabetes mellitus + Hypoglycemia: resolved hypoglycemia; transfer to ; FS with ISS    Problem/Plan - 6:  Problem: Essential hypertension. Plan: will avoid BP meds for now due to sepsis and hypotension. Monitor closely, BP normal with out meds.     Problem/Plan - 7;  Hyponatremia of 130 sec to 3rd spacing; today 133.     Problem/Plan - 8:  constipation: senna, colace, add miralax, lactulose  poc discussed with pt, team.    Prognosis: Very Poor    Code: DNR/DNI 79

## 2023-02-01 NOTE — PATIENT PROFILE ADULT. - PAIN CHRONIC, PROFILE
Problem: Hemodialysis  Goal: Dialysis: Safe, effective, and comfortable hemodialysis treatment (Hemodialysis nurse only)  Outcome: Outcome Met, Continue evaluating goal progress toward completion  Note: Patient completed total treatment time of 2 hour.  No UF goal was ordered.  Uneventful, patient sedated and intubated, and bp stable.    Goal: Dialysis: Free of complications related to initiation/termination of dialysis (Hemodialysis nurse only)  Outcome: Outcome Met, Continue evaluating goal progress toward completion  Note: HD cvc uncomplicated, achieve , and capped HD ports with sodium citrate.       yes

## 2024-05-28 NOTE — PATIENT PROFILE ADULT. - NSSUBSTANCEUSE_GEN_ALL_CORE_SD
8.6    8.16  )-----------( 332      ( 27 May 2024 18:20 )             27.6       05-27    136  |  103  |  48<H>  ----------------------------<  117<H>  6.2<HH>   |  23  |  1.7<H>    Ca    9.3      27 May 2024 18:20    TPro  7.5  /  Alb  4.1  /  TBili  <0.2  /  DBili  x   /  AST  14  /  ALT  6   /  AlkPhos  68  05-27              Urinalysis Basic - ( 27 May 2024 20:02 )    Color: Yellow / Appearance: Clear / SG: >1.030 / pH: x  Gluc: x / Ketone: Negative mg/dL  / Bili: Negative / Urobili: 0.2 mg/dL   Blood: x / Protein: Negative mg/dL / Nitrite: Negative   Leuk Esterase: Negative / RBC: x / WBC x   Sq Epi: x / Non Sq Epi: x / Bacteria: x        PT/INR - ( 27 May 2024 19:15 )   PT: 14.10 sec;   INR: 1.23 ratio         PTT - ( 27 May 2024 19:15 )  PTT:44.0 sec    Lactate Trend            CAPILLARY BLOOD GLUCOSE      POCT Blood Glucose.: 118 mg/dL (27 May 2024 19:36) never used